# Patient Record
Sex: FEMALE | Race: WHITE | Employment: OTHER | URBAN - METROPOLITAN AREA
[De-identification: names, ages, dates, MRNs, and addresses within clinical notes are randomized per-mention and may not be internally consistent; named-entity substitution may affect disease eponyms.]

---

## 2019-10-09 ENCOUNTER — HOSPITAL ENCOUNTER (OUTPATIENT)
Dept: GENERAL RADIOLOGY | Age: 79
Discharge: HOME | End: 2019-10-09
Payer: MEDICARE

## 2019-10-09 ENCOUNTER — HOSPITAL ENCOUNTER (OUTPATIENT)
Age: 79
Discharge: HOME | End: 2019-10-09
Payer: MEDICARE

## 2019-10-09 DIAGNOSIS — M54.50 CHRONIC MIDLINE LOW BACK PAIN WITHOUT SCIATICA: ICD-10-CM

## 2019-10-09 DIAGNOSIS — G89.29 CHRONIC MIDLINE LOW BACK PAIN WITHOUT SCIATICA: ICD-10-CM

## 2019-10-09 PROCEDURE — 72100 X-RAY EXAM L-S SPINE 2/3 VWS: CPT

## 2020-06-08 ENCOUNTER — APPOINTMENT (OUTPATIENT)
Dept: GENERAL RADIOLOGY | Age: 80
DRG: 884 | End: 2020-06-08
Payer: MEDICARE

## 2020-06-08 ENCOUNTER — APPOINTMENT (OUTPATIENT)
Dept: CT IMAGING | Age: 80
DRG: 884 | End: 2020-06-08
Payer: MEDICARE

## 2020-06-08 ENCOUNTER — HOSPITAL ENCOUNTER (INPATIENT)
Age: 80
LOS: 5 days | Discharge: HOME | DRG: 884 | End: 2020-06-13
Attending: EMERGENCY MEDICINE | Admitting: INTERNAL MEDICINE
Payer: MEDICARE

## 2020-06-08 PROBLEM — R41.82 AMS (ALTERED MENTAL STATUS): Status: ACTIVE | Noted: 2020-06-08

## 2020-06-08 LAB
ALBUMIN SERPL-MCNC: 4.6 GM/DL (ref 3.4–5)
ALP BLD-CCNC: 92 IU/L (ref 40–129)
ALT SERPL-CCNC: 19 U/L (ref 10–40)
ANION GAP SERPL CALCULATED.3IONS-SCNC: 12 MMOL/L (ref 4–16)
AST SERPL-CCNC: 26 IU/L (ref 15–37)
BACTERIA: NEGATIVE /HPF
BASOPHILS ABSOLUTE: 0 K/CU MM
BASOPHILS RELATIVE PERCENT: 0.6 % (ref 0–1)
BILIRUB SERPL-MCNC: 0.4 MG/DL (ref 0–1)
BILIRUBIN URINE: NEGATIVE MG/DL
BLOOD, URINE: NEGATIVE
BUN BLDV-MCNC: 18 MG/DL (ref 6–23)
CALCIUM SERPL-MCNC: 9.4 MG/DL (ref 8.3–10.6)
CHLORIDE BLD-SCNC: 100 MMOL/L (ref 99–110)
CHP ED QC CHECK: YES
CLARITY: ABNORMAL
CO2: 25 MMOL/L (ref 21–32)
COLOR: YELLOW
CREAT SERPL-MCNC: 0.8 MG/DL (ref 0.6–1.1)
DIFFERENTIAL TYPE: ABNORMAL
EKG ATRIAL RATE: 72 BPM
EKG DIAGNOSIS: NORMAL
EKG P AXIS: 28 DEGREES
EKG P-R INTERVAL: 140 MS
EKG Q-T INTERVAL: 384 MS
EKG QRS DURATION: 78 MS
EKG QTC CALCULATION (BAZETT): 420 MS
EKG R AXIS: -12 DEGREES
EKG T AXIS: 40 DEGREES
EKG VENTRICULAR RATE: 72 BPM
EOSINOPHILS ABSOLUTE: 0 K/CU MM
EOSINOPHILS RELATIVE PERCENT: 0.4 % (ref 0–3)
FOLATE: 15.6 NG/ML (ref 3.1–17.5)
GFR AFRICAN AMERICAN: >60 ML/MIN/1.73M2
GFR NON-AFRICAN AMERICAN: >60 ML/MIN/1.73M2
GLUCOSE BLD-MCNC: 82 MG/DL (ref 70–99)
GLUCOSE BLD-MCNC: 86 MG/DL
GLUCOSE BLD-MCNC: 87 MG/DL (ref 70–99)
GLUCOSE, URINE: NEGATIVE MG/DL
HCT VFR BLD CALC: 42.2 % (ref 37–47)
HEMOGLOBIN: 13.8 GM/DL (ref 12.5–16)
IMMATURE NEUTROPHIL %: 0.3 % (ref 0–0.43)
KETONES, URINE: NEGATIVE MG/DL
LACTATE: 0.9 MMOL/L (ref 0.4–2)
LEUKOCYTE ESTERASE, URINE: ABNORMAL
LIPASE: 49 IU/L (ref 13–60)
LYMPHOCYTES ABSOLUTE: 1 K/CU MM
LYMPHOCYTES RELATIVE PERCENT: 14.9 % (ref 24–44)
MAGNESIUM: 2 MG/DL (ref 1.8–2.4)
MCH RBC QN AUTO: 28.2 PG (ref 27–31)
MCHC RBC AUTO-ENTMCNC: 32.7 % (ref 32–36)
MCV RBC AUTO: 86.1 FL (ref 78–100)
MONOCYTES ABSOLUTE: 0.6 K/CU MM
MONOCYTES RELATIVE PERCENT: 9.4 % (ref 0–4)
MUCUS: ABNORMAL HPF
NITRITE URINE, QUANTITATIVE: NEGATIVE
NUCLEATED RBC %: 0 %
PDW BLD-RTO: 14.4 % (ref 11.7–14.9)
PH, URINE: 6 (ref 5–8)
PLATELET # BLD: 208 K/CU MM (ref 140–440)
PMV BLD AUTO: 9.3 FL (ref 7.5–11.1)
POTASSIUM SERPL-SCNC: 3.8 MMOL/L (ref 3.5–5.1)
PRO-BNP: 208.8 PG/ML
PROTEIN UA: NEGATIVE MG/DL
RBC # BLD: 4.9 M/CU MM (ref 4.2–5.4)
RBC URINE: <1 /HPF (ref 0–6)
SEGMENTED NEUTROPHILS ABSOLUTE COUNT: 5.1 K/CU MM
SEGMENTED NEUTROPHILS RELATIVE PERCENT: 74.4 % (ref 36–66)
SODIUM BLD-SCNC: 137 MMOL/L (ref 135–145)
SPECIFIC GRAVITY UA: 1.01 (ref 1–1.03)
T4 FREE: 1.12 NG/DL (ref 0.9–1.8)
TOTAL CK: 289 IU/L (ref 26–140)
TOTAL IMMATURE NEUTOROPHIL: 0.02 K/CU MM
TOTAL NUCLEATED RBC: 0 K/CU MM
TOTAL PROTEIN: 8.3 GM/DL (ref 6.4–8.2)
TRICHOMONAS: ABNORMAL /HPF
TROPONIN T: <0.01 NG/ML
TSH HIGH SENSITIVITY: 5.6 UIU/ML (ref 0.27–4.2)
UROBILINOGEN, URINE: NORMAL MG/DL (ref 0.2–1)
VITAMIN B-12: 527 PG/ML (ref 211–911)
WBC # BLD: 6.8 K/CU MM (ref 4–10.5)
WBC UA: 5 /HPF (ref 0–5)

## 2020-06-08 PROCEDURE — 83735 ASSAY OF MAGNESIUM: CPT

## 2020-06-08 PROCEDURE — 70498 CT ANGIOGRAPHY NECK: CPT

## 2020-06-08 PROCEDURE — 80053 COMPREHEN METABOLIC PANEL: CPT

## 2020-06-08 PROCEDURE — 82607 VITAMIN B-12: CPT

## 2020-06-08 PROCEDURE — 82962 GLUCOSE BLOOD TEST: CPT

## 2020-06-08 PROCEDURE — 70450 CT HEAD/BRAIN W/O DYE: CPT

## 2020-06-08 PROCEDURE — 1200000000 HC SEMI PRIVATE

## 2020-06-08 PROCEDURE — 2580000003 HC RX 258: Performed by: NURSE PRACTITIONER

## 2020-06-08 PROCEDURE — 2580000003 HC RX 258: Performed by: INTERNAL MEDICINE

## 2020-06-08 PROCEDURE — 84484 ASSAY OF TROPONIN QUANT: CPT

## 2020-06-08 PROCEDURE — 84443 ASSAY THYROID STIM HORMONE: CPT

## 2020-06-08 PROCEDURE — 6370000000 HC RX 637 (ALT 250 FOR IP): Performed by: NURSE PRACTITIONER

## 2020-06-08 PROCEDURE — 94761 N-INVAS EAR/PLS OXIMETRY MLT: CPT

## 2020-06-08 PROCEDURE — 84439 ASSAY OF FREE THYROXINE: CPT

## 2020-06-08 PROCEDURE — 36415 COLL VENOUS BLD VENIPUNCTURE: CPT

## 2020-06-08 PROCEDURE — 82550 ASSAY OF CK (CPK): CPT

## 2020-06-08 PROCEDURE — 85025 COMPLETE CBC W/AUTO DIFF WBC: CPT

## 2020-06-08 PROCEDURE — 81001 URINALYSIS AUTO W/SCOPE: CPT

## 2020-06-08 PROCEDURE — 93005 ELECTROCARDIOGRAM TRACING: CPT | Performed by: EMERGENCY MEDICINE

## 2020-06-08 PROCEDURE — 82746 ASSAY OF FOLIC ACID SERUM: CPT

## 2020-06-08 PROCEDURE — 83880 ASSAY OF NATRIURETIC PEPTIDE: CPT

## 2020-06-08 PROCEDURE — 93010 ELECTROCARDIOGRAM REPORT: CPT | Performed by: INTERNAL MEDICINE

## 2020-06-08 PROCEDURE — 6360000004 HC RX CONTRAST MEDICATION: Performed by: INTERNAL MEDICINE

## 2020-06-08 PROCEDURE — 99285 EMERGENCY DEPT VISIT HI MDM: CPT

## 2020-06-08 PROCEDURE — 71045 X-RAY EXAM CHEST 1 VIEW: CPT

## 2020-06-08 PROCEDURE — 83605 ASSAY OF LACTIC ACID: CPT

## 2020-06-08 PROCEDURE — 83690 ASSAY OF LIPASE: CPT

## 2020-06-08 PROCEDURE — 6360000002 HC RX W HCPCS: Performed by: NURSE PRACTITIONER

## 2020-06-08 RX ORDER — ACETAMINOPHEN 325 MG/1
650 TABLET ORAL EVERY 6 HOURS PRN
Status: DISCONTINUED | OUTPATIENT
Start: 2020-06-08 | End: 2020-06-13 | Stop reason: HOSPADM

## 2020-06-08 RX ORDER — EZETIMIBE 10 MG/1
10 TABLET ORAL DAILY
COMMUNITY

## 2020-06-08 RX ORDER — SODIUM CHLORIDE 0.9 % (FLUSH) 0.9 %
10 SYRINGE (ML) INJECTION PRN
Status: DISCONTINUED | OUTPATIENT
Start: 2020-06-08 | End: 2020-06-13 | Stop reason: HOSPADM

## 2020-06-08 RX ORDER — OMEPRAZOLE 40 MG/1
40 CAPSULE, DELAYED RELEASE ORAL DAILY
COMMUNITY
Start: 2020-06-03

## 2020-06-08 RX ORDER — PROMETHAZINE HYDROCHLORIDE 25 MG/1
12.5 TABLET ORAL EVERY 6 HOURS PRN
Status: DISCONTINUED | OUTPATIENT
Start: 2020-06-08 | End: 2020-06-13 | Stop reason: HOSPADM

## 2020-06-08 RX ORDER — EZETIMIBE 10 MG/1
10 TABLET ORAL DAILY
Status: DISCONTINUED | OUTPATIENT
Start: 2020-06-08 | End: 2020-06-13 | Stop reason: HOSPADM

## 2020-06-08 RX ORDER — POLYETHYLENE GLYCOL 3350 17 G/17G
17 POWDER, FOR SOLUTION ORAL DAILY PRN
Status: DISCONTINUED | OUTPATIENT
Start: 2020-06-08 | End: 2020-06-13 | Stop reason: HOSPADM

## 2020-06-08 RX ORDER — ONDANSETRON 2 MG/ML
4 INJECTION INTRAMUSCULAR; INTRAVENOUS EVERY 6 HOURS PRN
Status: DISCONTINUED | OUTPATIENT
Start: 2020-06-08 | End: 2020-06-13 | Stop reason: HOSPADM

## 2020-06-08 RX ORDER — BUSPIRONE HYDROCHLORIDE 5 MG/1
5 TABLET ORAL 3 TIMES DAILY
Status: DISCONTINUED | OUTPATIENT
Start: 2020-06-08 | End: 2020-06-13 | Stop reason: HOSPADM

## 2020-06-08 RX ORDER — SODIUM CHLORIDE 0.9 % (FLUSH) 0.9 %
10 SYRINGE (ML) INJECTION 2 TIMES DAILY
Status: DISCONTINUED | OUTPATIENT
Start: 2020-06-08 | End: 2020-06-13 | Stop reason: HOSPADM

## 2020-06-08 RX ORDER — SODIUM CHLORIDE 0.9 % (FLUSH) 0.9 %
10 SYRINGE (ML) INJECTION EVERY 12 HOURS SCHEDULED
Status: DISCONTINUED | OUTPATIENT
Start: 2020-06-08 | End: 2020-06-13 | Stop reason: HOSPADM

## 2020-06-08 RX ORDER — ACETAMINOPHEN 650 MG/1
650 SUPPOSITORY RECTAL EVERY 6 HOURS PRN
Status: DISCONTINUED | OUTPATIENT
Start: 2020-06-08 | End: 2020-06-13 | Stop reason: HOSPADM

## 2020-06-08 RX ORDER — BUSPIRONE HYDROCHLORIDE 5 MG/1
5 TABLET ORAL 2 TIMES DAILY
Status: ON HOLD | COMMUNITY
End: 2021-03-17 | Stop reason: HOSPADM

## 2020-06-08 RX ADMIN — SODIUM CHLORIDE, PRESERVATIVE FREE 10 ML: 5 INJECTION INTRAVENOUS at 19:35

## 2020-06-08 RX ADMIN — EZETIMIBE 10 MG: 10 TABLET ORAL at 21:39

## 2020-06-08 RX ADMIN — SODIUM CHLORIDE, PRESERVATIVE FREE 10 ML: 5 INJECTION INTRAVENOUS at 21:40

## 2020-06-08 RX ADMIN — BUSPIRONE HYDROCHLORIDE 5 MG: 5 TABLET ORAL at 21:40

## 2020-06-08 RX ADMIN — IOPAMIDOL 70 ML: 755 INJECTION, SOLUTION INTRAVENOUS at 19:35

## 2020-06-08 RX ADMIN — ENOXAPARIN SODIUM 40 MG: 40 INJECTION SUBCUTANEOUS at 21:40

## 2020-06-08 ASSESSMENT — PAIN SCALES - GENERAL: PAINLEVEL_OUTOF10: 0
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
## 2020-06-08 NOTE — ED NOTES
Lucia Minor from case management is at the bedside at this time. This nurse spoke with the  nurse concerning the grand-daughter, Jack Madsen, who is waiting in the [address] [facility], to advise her that a  will be out to speak to her after he has spoken with the patient.       Yoseph Gusman, FORTUNATO  06/08/20 2266

## 2020-06-08 NOTE — PROGRESS NOTES
Medication History  [facility]    Patient Name: Jan Arreguin 1940     Medication history has been completed by: Micki Gamboa CPhT    Source(s) of information: insurance claims     Primary Care Physician: KALI Back CNP     Pharmacy: [address]    Allergies as of 06/08/2020    (No Known Allergies)        Prior to Admission medications    Medication Sig Start Date End Date Taking? Authorizing Provider   ezetimibe (ZETIA) 10 MG tablet Take 10 mg by mouth daily    Historical Provider, MD   busPIRone (BUSPAR) 5 MG tablet Take 5 mg by mouth 3 times daily    Historical Provider, MD   omeprazole (PRILOSEC) 40 MG delayed release capsule Take 40 mg by mouth daily 6/3/20   Historical Provider, MD       Medications added or changed (ex. new medication, dosage change, interval change, formulation change):  See medication list as stated above    Comments:  Unable to assess patient d/t altered status. Medication list per insurance claims.      To my knowledge the above medication history is accurate as of 6/8/2020 5:11 PM.   Micki Gamboa CPhT   6/8/2020 5:11 PM
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
## 2020-06-08 NOTE — PLAN OF CARE
Problem: Falls - Risk of:  Goal: Will remain free from falls  Outcome: Ongoing  Goal: Absence of physical injury  Outcome: Ongoing     Problem: Confusion - Acute:  Goal: Absence of continued neurological deterioration signs and symptoms  Outcome: Ongoing  Goal: Mental status will be restored to baseline  Outcome: Ongoing     Problem: Discharge Planning:  Goal: Ability to perform activities of daily living will improve  Outcome: Ongoing  Goal: Participates in care planning  Outcome: Ongoing     Problem: Injury - Risk of, Physical Injury:  Goal: Will remain free from falls  Outcome: Ongoing  Goal: Absence of physical injury  Outcome: Ongoing     Problem: Mood - Altered:  Goal: Mood stable  Outcome: Ongoing  Goal: Absence of abusive behavior  Outcome: Ongoing  Goal: Verbalizations of feeling emotionally comfortable while being cared for will increase  Outcome: Ongoing     Problem: Psychomotor Activity - Altered:  Goal: Absence of psychomotor disturbance signs and symptoms  Outcome: Ongoing     Problem: Sensory Perception - Impaired:  Goal: Demonstrations of improved sensory functioning will increase  Outcome: Ongoing  Goal: Decrease in sensory misperception frequency  Outcome: Ongoing  Goal: Able to refrain from responding to false sensory perceptions  Outcome: Ongoing  Goal: Demonstrates accurate environmental perceptions  Outcome: Ongoing  Goal: Able to distinguish between reality-based and nonreality-based thinking  Outcome: Ongoing  Goal: Able to interrupt nonreality-based thinking  Outcome: Ongoing     Problem: Sleep Pattern Disturbance:  Goal: Appears well-rested  Outcome: Ongoing

## 2020-06-08 NOTE — ED NOTES
Meal tray ordered for pt by safety sitmiguel Meredith.         Debora Peterson, FORTUNATO  06/08/20 4761

## 2020-06-08 NOTE — ED NOTES
Bed: ED-16  Expected date:   Expected time:   Means of arrival:   Comments:  Srinivas Michaud RN  06/08/20 5402
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
## 2020-06-08 NOTE — H&P
History and Physical      Name:  Daina Martin /Age/Sex: 1940  (70 y.o. female)   MRN & CSN:  [id number] & [id number] Admission Date/Time: 2020  1:25 PM   Location:  ED16/ED-16 PCP: KALI Paige - CNP       Discussed patient with Dr. Orlando Hager and Plan:     Daina Martin is a 70 y.o.  female  who presents with AMS    - AMS  Likely has undiagnosed dementia; per family, acutely worse- wandered away from home  Patient expressed concern about returning to live with granddaughter to ED staff- CM consulted by ED  ? Progressing undiagnosed dementia  UA negative; no s/sx of infection  CT head with ? Mass/Aneurysm (see below)  Check b12, ammonia, Free t 4 (TSH mildly elevated 5.6)  Sitter at bedside    - ? Mass, brain   Headaches past week  CT head: 1 cm non-specific frontal mid-line mass-- ? Mass v aneurysm  Check CTA head, per RAD recommendation  Holding neuro consult pending CTA results      Chronic  - HLD- Cont zetia  - Anxiety- Cont buspar  - ? Dementia- Family denies formal diagnosis; reports to CM that patient has memory issues/requires careful watch     Discussed patient with ER physician     Diet GEN   DVT Prophylaxis [x] Lovenox, []  Heparin, [] SCDs, [] Ambulation   GI Prophylaxis [x] PPI,  [] H2 Blocker,  [] Carafate,  [] Diet/Tube Feeds   Code Status Full   Disposition Patient requires continued admission due to AMS   MDM [] Low, [x] Moderate,[]  High  Patient's risk as above due to      [] One or more chronic illnesses with exacerbation progression      [x] Two or more stable chronic illnesses      [x] Undiagnosed new problem with uncertain prognosis      [] Elective major surgery      []Prescription drug management       History of Present Illness:     Chief Complaint: Ivy Vasquez is a 70 y.o.  female  who presents with the above complaints, onset today. Context is, patient has ?  Undiagnosed hx of dementia, and was found walking

## 2020-06-08 NOTE — ED NOTES
Back to the restroom with this nurse in attendance. The patient voided very well. Full specimen sent to lab. Lab called.       Sherrell Lemos RN  06/08/20 6119

## 2020-06-09 PROBLEM — R47.01 EXPRESSIVE APHASIA: Status: ACTIVE | Noted: 2020-06-09

## 2020-06-09 PROBLEM — E43 SEVERE MALNUTRITION (HCC): Chronic | Status: ACTIVE | Noted: 2020-06-09

## 2020-06-09 PROBLEM — I67.1 ANTERIOR COMMUNICATING ARTERY ANEURYSM: Status: ACTIVE | Noted: 2020-06-09

## 2020-06-09 LAB
AMMONIA: 38 UMOL/L (ref 11–51)
BASOPHILS ABSOLUTE: 0.1 K/CU MM
BASOPHILS RELATIVE PERCENT: 0.8 % (ref 0–1)
DIFFERENTIAL TYPE: ABNORMAL
EOSINOPHILS ABSOLUTE: 0.2 K/CU MM
EOSINOPHILS RELATIVE PERCENT: 2.4 % (ref 0–3)
ERYTHROCYTE SEDIMENTATION RATE: 11 MM/HR (ref 0–30)
HCT VFR BLD CALC: 40.7 % (ref 37–47)
HEMOGLOBIN: 13.2 GM/DL (ref 12.5–16)
IMMATURE NEUTROPHIL %: 0.3 % (ref 0–0.43)
LYMPHOCYTES ABSOLUTE: 2.2 K/CU MM
LYMPHOCYTES RELATIVE PERCENT: 34.2 % (ref 24–44)
MCH RBC QN AUTO: 28 PG (ref 27–31)
MCHC RBC AUTO-ENTMCNC: 32.4 % (ref 32–36)
MCV RBC AUTO: 86.2 FL (ref 78–100)
MONOCYTES ABSOLUTE: 0.8 K/CU MM
MONOCYTES RELATIVE PERCENT: 11.9 % (ref 0–4)
NUCLEATED RBC %: 0 %
PDW BLD-RTO: 14.5 % (ref 11.7–14.9)
PLATELET # BLD: 199 K/CU MM (ref 140–440)
PMV BLD AUTO: 9.5 FL (ref 7.5–11.1)
RBC # BLD: 4.72 M/CU MM (ref 4.2–5.4)
SEGMENTED NEUTROPHILS ABSOLUTE COUNT: 3.2 K/CU MM
SEGMENTED NEUTROPHILS RELATIVE PERCENT: 50.4 % (ref 36–66)
TOTAL CK: 213 IU/L (ref 26–140)
TOTAL IMMATURE NEUTOROPHIL: 0.02 K/CU MM
TOTAL NUCLEATED RBC: 0 K/CU MM
WBC # BLD: 6.3 K/CU MM (ref 4–10.5)

## 2020-06-09 PROCEDURE — 6360000002 HC RX W HCPCS: Performed by: NURSE PRACTITIONER

## 2020-06-09 PROCEDURE — 6370000000 HC RX 637 (ALT 250 FOR IP): Performed by: NURSE PRACTITIONER

## 2020-06-09 PROCEDURE — 82550 ASSAY OF CK (CPK): CPT

## 2020-06-09 PROCEDURE — 1200000000 HC SEMI PRIVATE

## 2020-06-09 PROCEDURE — 2580000003 HC RX 258: Performed by: INTERNAL MEDICINE

## 2020-06-09 PROCEDURE — 99223 1ST HOSP IP/OBS HIGH 75: CPT | Performed by: NURSE PRACTITIONER

## 2020-06-09 PROCEDURE — 36415 COLL VENOUS BLD VENIPUNCTURE: CPT

## 2020-06-09 PROCEDURE — 2580000003 HC RX 258: Performed by: NURSE PRACTITIONER

## 2020-06-09 PROCEDURE — 2500000003 HC RX 250 WO HCPCS: Performed by: NURSE PRACTITIONER

## 2020-06-09 PROCEDURE — 85652 RBC SED RATE AUTOMATED: CPT

## 2020-06-09 PROCEDURE — 85025 COMPLETE CBC W/AUTO DIFF WBC: CPT

## 2020-06-09 PROCEDURE — 94761 N-INVAS EAR/PLS OXIMETRY MLT: CPT

## 2020-06-09 PROCEDURE — 82140 ASSAY OF AMMONIA: CPT

## 2020-06-09 RX ORDER — KETOROLAC TROMETHAMINE 30 MG/ML
15 INJECTION, SOLUTION INTRAMUSCULAR; INTRAVENOUS ONCE
Status: COMPLETED | OUTPATIENT
Start: 2020-06-09 | End: 2020-06-09

## 2020-06-09 RX ORDER — ASPIRIN 81 MG/1
81 TABLET, CHEWABLE ORAL DAILY
Status: DISCONTINUED | OUTPATIENT
Start: 2020-06-09 | End: 2020-06-13 | Stop reason: HOSPADM

## 2020-06-09 RX ORDER — DEXAMETHASONE SODIUM PHOSPHATE 4 MG/ML
10 INJECTION, SOLUTION INTRA-ARTICULAR; INTRALESIONAL; INTRAMUSCULAR; INTRAVENOUS; SOFT TISSUE ONCE
Status: COMPLETED | OUTPATIENT
Start: 2020-06-09 | End: 2020-06-09

## 2020-06-09 RX ADMIN — ACETAMINOPHEN 650 MG: 325 TABLET ORAL at 17:28

## 2020-06-09 RX ADMIN — VALPROATE SODIUM 500 MG: 100 INJECTION, SOLUTION INTRAVENOUS at 21:25

## 2020-06-09 RX ADMIN — DEXAMETHASONE SODIUM PHOSPHATE 10 MG: 4 INJECTION, SOLUTION INTRAMUSCULAR; INTRAVENOUS at 12:41

## 2020-06-09 RX ADMIN — ASPIRIN 81 MG 81 MG: 81 TABLET ORAL at 13:01

## 2020-06-09 RX ADMIN — SODIUM CHLORIDE, PRESERVATIVE FREE 10 ML: 5 INJECTION INTRAVENOUS at 08:41

## 2020-06-09 RX ADMIN — ACETAMINOPHEN 650 MG: 325 TABLET ORAL at 11:28

## 2020-06-09 RX ADMIN — VALPROATE SODIUM 500 MG: 100 INJECTION, SOLUTION INTRAVENOUS at 12:41

## 2020-06-09 RX ADMIN — SODIUM CHLORIDE, PRESERVATIVE FREE 10 ML: 5 INJECTION INTRAVENOUS at 08:42

## 2020-06-09 RX ADMIN — EZETIMIBE 10 MG: 10 TABLET ORAL at 08:41

## 2020-06-09 RX ADMIN — SODIUM CHLORIDE, PRESERVATIVE FREE 10 ML: 5 INJECTION INTRAVENOUS at 21:25

## 2020-06-09 RX ADMIN — KETOROLAC TROMETHAMINE 15 MG: 30 INJECTION, SOLUTION INTRAMUSCULAR; INTRAVENOUS at 16:03

## 2020-06-09 RX ADMIN — ENOXAPARIN SODIUM 40 MG: 40 INJECTION SUBCUTANEOUS at 08:41

## 2020-06-09 RX ADMIN — BUSPIRONE HYDROCHLORIDE 5 MG: 5 TABLET ORAL at 08:41

## 2020-06-09 RX ADMIN — ACETAMINOPHEN 650 MG: 325 TABLET ORAL at 04:44

## 2020-06-09 RX ADMIN — BUSPIRONE HYDROCHLORIDE 5 MG: 5 TABLET ORAL at 13:01

## 2020-06-09 RX ADMIN — BUSPIRONE HYDROCHLORIDE 5 MG: 5 TABLET ORAL at 21:25

## 2020-06-09 ASSESSMENT — PAIN DESCRIPTION - DESCRIPTORS
DESCRIPTORS: HEADACHE
DESCRIPTORS: HEADACHE

## 2020-06-09 ASSESSMENT — PAIN SCALES - GENERAL
PAINLEVEL_OUTOF10: 4
PAINLEVEL_OUTOF10: 3
PAINLEVEL_OUTOF10: 8
PAINLEVEL_OUTOF10: 8
PAINLEVEL_OUTOF10: 3
PAINLEVEL_OUTOF10: 6
PAINLEVEL_OUTOF10: 3

## 2020-06-09 ASSESSMENT — PAIN DESCRIPTION - LOCATION
LOCATION: HEAD
LOCATION: HEAD

## 2020-06-09 ASSESSMENT — PAIN DESCRIPTION - PAIN TYPE
TYPE: ACUTE PAIN
TYPE: ACUTE PAIN

## 2020-06-09 NOTE — CARE COORDINATION
This CM delivered pt eye glasses to her room, pt was thankful for the delivery, tech in room at the time I stopped in pt room to give pt her glasses.  MONICARN/CM

## 2020-06-09 NOTE — CONSULTS
mg Oral TID    ezetimibe  10 mg Oral Daily    sodium chloride flush  10 mL Intravenous 2 times per day    enoxaparin  40 mg Subcutaneous Daily    sodium chloride flush  10 mL Intravenous BID     Continuous Infusions:  PRN Meds:.sodium chloride flush, acetaminophen **OR** acetaminophen, polyethylene glycol, promethazine **OR** ondansetron    No Known Allergies  Social History     Socioeconomic History    Marital status: Single     Spouse name: Not on file    Number of children: Not on file    Years of education: Not on file    Highest education level: Not on file   Occupational History    Not on file   Social Needs    Financial resource strain: Not on file    Food insecurity     Worry: Not on file     Inability: Not on file    Transportation needs     Medical: Not on file     Non-medical: Not on file   Tobacco Use    Smoking status: Former Smoker     Types: Cigarettes    Smokeless tobacco: Never Used   Substance and Sexual Activity    Alcohol use: Not Currently    Drug use: Never    Sexual activity: Not Currently   Lifestyle    Physical activity     Days per week: Not on file     Minutes per session: Not on file    Stress: Not on file   Relationships    Social connections     Talks on phone: Not on file     Gets together: Not on file     Attends Sikhism service: Not on file     Active member of club or organization: Not on file     Attends meetings of clubs or organizations: Not on file     Relationship status: Not on file    Intimate partner violence     Fear of current or ex partner: Not on file     Emotionally abused: Not on file     Physically abused: Not on file     Forced sexual activity: Not on file   Other Topics Concern    Not on file   Social History Narrative    Not on file      History reviewed. No pertinent family history. Review of Symptoms:    14-point system review completed. All of which are negative except as mentioned above.     Physical Exam:       Gen: Alert and cooperative but disoriented  HEENT: NC/AT, EOMI, PERRL, mmm,  neck supple, no meningeal signs; bilateral temporal region tenderness to palpation  Heart: regular  Lungs: no distress  Ext: no edema, no calf tenderness b/l  Psych: normal mood and affect  Skin: no rashes or lesions    NEUROLOGIC EXAM:    Mental Status: A&O to self, he has an expressive aphasia with there is word finding difficulties she has difficulty naming but she also has a rambling of thoughts that will make sense of the conversation, her attention and concentration is off, but her speech is clear    Cranial Nerve Exam:   CN II-XII:  PERRL, VFF, no nystagmus, no gaze paresis, sensation V1-V3 intact b/l, muscles of facial expression symmetric; hearing intact to conversational tone, palate elevates symmetrically, shoulder elevation symmetric and tongue protrudes midline with movement side to side. Motor Exam:       Strength 5/5 UE's/LE's b/l  Tone and bulk normal   No pronator drift    Deep Tendon Reflexes: 2/4 biceps, triceps, brachioradialis, patellar, and achilles b/l; flexor plantar responses b/l    Sensation: Intact light touch/temp UE's/LE's b/l    Coordination/Cerebellum:       Tremors--none      Rapidly alternating movements: no dysdiadochokinesia b/l                Finger-to-Nose: no dysmetria b/l    Gait and stance:      Gait: deferred for safety       LABS:     Recent Labs     06/08/20  1420 06/08/20  1616 06/09/20  0415   WBC 6.8  --  6.3     --   --    K 3.8  --   --      --   --    CO2 25  --   --    BUN 18  --   --    CREATININE 0.8  --   --    GLUCOSE 82 86  --      Sed rate pending       IMAGING:    Mri brain wo pending  CTA Acomm aneursym 1.2cm     Images reviewed personally and agree with above impression.        ASSESSMENT/PLAN:     3 70year old female with bilateral temple region cephalgia with associated expressive aphasia and some disorientation, consideration for Infarction v. Migraine v. GCA v. 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 Plan of care as follows:  1. Neuro Exam:  1. Hinduism region tenderness b/l   2. Word findings   2. Neurodiagnostics:  1. MRI brain wo pending  2. CTA as above  3. Sed rate pending   3. Medications:  1. Depacon, decadron, and toradol for acute headache   2. ASA 81mg okay  4. PT/OT/ST:  1. Per their recommendations  5. Follow up:  1. Pending completion of neurodiagnostics         Thank you for allowing us to participate in the care of your patient. If there are any questions regarding evaluation please feel free to contact us.      KALI Montague - WONG, 2020

## 2020-06-09 NOTE — PLAN OF CARE
Problem: Falls - Risk of:  Goal: Will remain free from falls  Description: Will remain free from falls  6/9/2020 0350 by Nanette Watts RN  Outcome: Ongoing  6/8/2020 1909 by Barby Moody RN  Outcome: Ongoing  Goal: Absence of physical injury  Description: Absence of physical injury  6/9/2020 0350 by Nanette Watts RN  Outcome: Ongoing  6/8/2020 1909 by Barby Moody RN  Outcome: Ongoing     Problem: Confusion - Acute:  Goal: Absence of continued neurological deterioration signs and symptoms  Description: Absence of continued neurological deterioration signs and symptoms  6/9/2020 0350 by Nanette Watts RN  Outcome: Ongoing  6/8/2020 1909 by Barby Moody RN  Outcome: Ongoing  Goal: Mental status will be restored to baseline  Description: Mental status will be restored to baseline  6/9/2020 0350 by Nanette Watts RN  Outcome: Ongoing  6/8/2020 1909 by Barby Moody RN  Outcome: Ongoing     Problem: Discharge Planning:  Goal: Ability to perform activities of daily living will improve  Description: Ability to perform activities of daily living will improve  6/9/2020 0350 by Nanette Watts RN  Outcome: Ongoing  6/8/2020 1909 by Barby Moody RN  Outcome: Ongoing  Goal: Participates in care planning  Description: Participates in care planning  6/9/2020 0350 by Nanette Watts RN  Outcome: Ongoing  6/8/2020 1909 by Barby Moody RN  Outcome: Ongoing     Problem: Injury - Risk of, Physical Injury:  Goal: Will remain free from falls  Description: Will remain free from falls  6/9/2020 0350 by Nanette Watts RN  Outcome: Ongoing  6/8/2020 1909 by Barby Moody RN  Outcome: Ongoing  Goal: Absence of physical injury  Description: Absence of physical injury  6/9/2020 0350 by Nanette Watts RN  Outcome: Ongoing  6/8/2020 1909 by Barby Moody RN  Outcome: Ongoing     Problem: Mood - Altered:  Goal: Mood stable  Description: Mood stable  6/9/2020 0350 by Nanette Watts RN  Outcome:

## 2020-06-09 NOTE — PROGRESS NOTES
Department of Internal Medicine  General Internal Medicine  Attending Progress Note      SUBJECTIVE:  She has very poor memory. She does not know the year. She cannot answer where she is. She seems to have dementia. Not sure if she is confabulating or not. I talked to her granddaughter who lives with her; she says that she has FH of Alzheimer's. Her mental status has been declining but most rapidly in the last two months.       OBJECTIVE      Medications    Current Facility-Administered Medications: busPIRone (BUSPAR) tablet 5 mg, 5 mg, Oral, TID  ezetimibe (ZETIA) tablet 10 mg, 10 mg, Oral, Daily  sodium chloride flush 0.9 % injection 10 mL, 10 mL, Intravenous, 2 times per day  sodium chloride flush 0.9 % injection 10 mL, 10 mL, Intravenous, PRN  acetaminophen (TYLENOL) tablet 650 mg, 650 mg, Oral, Q6H PRN **OR** acetaminophen (TYLENOL) suppository 650 mg, 650 mg, Rectal, Q6H PRN  polyethylene glycol (GLYCOLAX) packet 17 g, 17 g, Oral, Daily PRN  promethazine (PHENERGAN) tablet 12.5 mg, 12.5 mg, Oral, Q6H PRN **OR** ondansetron (ZOFRAN) injection 4 mg, 4 mg, Intravenous, Q6H PRN  enoxaparin (LOVENOX) injection 40 mg, 40 mg, Subcutaneous, Daily  sodium chloride flush 0.9 % injection 10 mL, 10 mL, Intravenous, BID      Physical    VITALS:  /62   Pulse 64   Temp 97.3 °F (36.3 °C) (Oral)   Resp 17   Ht 5' 3\" (1.6 m)   Wt 95 lb 10.9 oz (43.4 kg)   SpO2 98%   BMI 16.95 kg/m²   Gen - a & O x 1  HEENT -  PERRLA, EOMI  CV - RRR no M/G/R, normal s1, s2  Lungs - CTA bilaterally no W/C/R  Abd - soft, NT, ND  Ext - no cyanosis or edema    Data    CBC:   Lab Results   Component Value Date    WBC 6.3 06/09/2020    RBC 4.72 06/09/2020    HGB 13.2 06/09/2020    HCT 40.7 06/09/2020    MCV 86.2 06/09/2020    MCH 28.0 06/09/2020    MCHC 32.4 06/09/2020    RDW 14.5 06/09/2020     06/09/2020    MPV 9.5 06/09/2020       ASSESSMENT AND PLAN        Chip Baker is a 69 yo  female who presents

## 2020-06-10 ENCOUNTER — APPOINTMENT (OUTPATIENT)
Dept: MRI IMAGING | Age: 80
DRG: 884 | End: 2020-06-10
Payer: MEDICARE

## 2020-06-10 LAB
ALBUMIN SERPL-MCNC: 4.3 GM/DL (ref 3.4–5)
ALP BLD-CCNC: 85 IU/L (ref 40–128)
ALT SERPL-CCNC: 15 U/L (ref 10–40)
ANION GAP SERPL CALCULATED.3IONS-SCNC: 9 MMOL/L (ref 4–16)
AST SERPL-CCNC: 23 IU/L (ref 15–37)
BILIRUB SERPL-MCNC: 0.3 MG/DL (ref 0–1)
BUN BLDV-MCNC: 20 MG/DL (ref 6–23)
CALCIUM SERPL-MCNC: 10.1 MG/DL (ref 8.3–10.6)
CHLORIDE BLD-SCNC: 101 MMOL/L (ref 99–110)
CO2: 26 MMOL/L (ref 21–32)
CREAT SERPL-MCNC: 0.8 MG/DL (ref 0.6–1.1)
GFR AFRICAN AMERICAN: >60 ML/MIN/1.73M2
GFR NON-AFRICAN AMERICAN: >60 ML/MIN/1.73M2
GLUCOSE BLD-MCNC: 94 MG/DL (ref 70–99)
HCT VFR BLD CALC: 41.3 % (ref 37–47)
HEMOGLOBIN: 13.6 GM/DL (ref 12.5–16)
MCH RBC QN AUTO: 28.4 PG (ref 27–31)
MCHC RBC AUTO-ENTMCNC: 32.9 % (ref 32–36)
MCV RBC AUTO: 86.2 FL (ref 78–100)
PDW BLD-RTO: 14.1 % (ref 11.7–14.9)
PLATELET # BLD: 227 K/CU MM (ref 140–440)
PMV BLD AUTO: 9.6 FL (ref 7.5–11.1)
POTASSIUM SERPL-SCNC: 4 MMOL/L (ref 3.5–5.1)
RBC # BLD: 4.79 M/CU MM (ref 4.2–5.4)
SODIUM BLD-SCNC: 136 MMOL/L (ref 135–145)
TOTAL PROTEIN: 7.3 GM/DL (ref 6.4–8.2)
WBC # BLD: 8.4 K/CU MM (ref 4–10.5)

## 2020-06-10 PROCEDURE — 6360000002 HC RX W HCPCS: Performed by: INTERNAL MEDICINE

## 2020-06-10 PROCEDURE — 6370000000 HC RX 637 (ALT 250 FOR IP): Performed by: NURSE PRACTITIONER

## 2020-06-10 PROCEDURE — 2580000003 HC RX 258: Performed by: NURSE PRACTITIONER

## 2020-06-10 PROCEDURE — 80053 COMPREHEN METABOLIC PANEL: CPT

## 2020-06-10 PROCEDURE — 1200000000 HC SEMI PRIVATE

## 2020-06-10 PROCEDURE — 6360000002 HC RX W HCPCS: Performed by: NURSE PRACTITIONER

## 2020-06-10 PROCEDURE — 2500000003 HC RX 250 WO HCPCS: Performed by: NURSE PRACTITIONER

## 2020-06-10 PROCEDURE — 36415 COLL VENOUS BLD VENIPUNCTURE: CPT

## 2020-06-10 PROCEDURE — 85027 COMPLETE CBC AUTOMATED: CPT

## 2020-06-10 PROCEDURE — 99233 SBSQ HOSP IP/OBS HIGH 50: CPT | Performed by: NURSE PRACTITIONER

## 2020-06-10 PROCEDURE — 6370000000 HC RX 637 (ALT 250 FOR IP): Performed by: INTERNAL MEDICINE

## 2020-06-10 PROCEDURE — 6360000002 HC RX W HCPCS: Performed by: PHYSICIAN ASSISTANT

## 2020-06-10 PROCEDURE — 94761 N-INVAS EAR/PLS OXIMETRY MLT: CPT

## 2020-06-10 RX ORDER — HALOPERIDOL 5 MG/ML
5 INJECTION INTRAMUSCULAR ONCE
Status: COMPLETED | OUTPATIENT
Start: 2020-06-10 | End: 2020-06-10

## 2020-06-10 RX ORDER — DIPHENHYDRAMINE HYDROCHLORIDE 50 MG/ML
50 INJECTION INTRAMUSCULAR; INTRAVENOUS
Status: COMPLETED | OUTPATIENT
Start: 2020-06-10 | End: 2020-06-10

## 2020-06-10 RX ORDER — HALOPERIDOL 1 MG/1
0.5 TABLET ORAL ONCE
Status: DISCONTINUED | OUTPATIENT
Start: 2020-06-10 | End: 2020-06-13 | Stop reason: HOSPADM

## 2020-06-10 RX ORDER — LORAZEPAM 2 MG/ML
1 INJECTION INTRAMUSCULAR ONCE
Status: COMPLETED | OUTPATIENT
Start: 2020-06-10 | End: 2020-06-10

## 2020-06-10 RX ORDER — HALOPERIDOL 1 MG/1
1 TABLET ORAL ONCE
Status: COMPLETED | OUTPATIENT
Start: 2020-06-10 | End: 2020-06-10

## 2020-06-10 RX ADMIN — ENOXAPARIN SODIUM 40 MG: 40 INJECTION SUBCUTANEOUS at 08:05

## 2020-06-10 RX ADMIN — VALPROATE SODIUM 500 MG: 100 INJECTION, SOLUTION INTRAVENOUS at 04:33

## 2020-06-10 RX ADMIN — HALOPERIDOL 1 MG: 1 TABLET ORAL at 11:37

## 2020-06-10 RX ADMIN — LORAZEPAM 1 MG: 2 INJECTION INTRAMUSCULAR; INTRAVENOUS at 15:09

## 2020-06-10 RX ADMIN — ACETAMINOPHEN 650 MG: 325 TABLET ORAL at 03:43

## 2020-06-10 RX ADMIN — HALOPERIDOL LACTATE 5 MG: 5 INJECTION INTRAMUSCULAR at 23:16

## 2020-06-10 RX ADMIN — SODIUM CHLORIDE, PRESERVATIVE FREE 10 ML: 5 INJECTION INTRAVENOUS at 21:51

## 2020-06-10 RX ADMIN — EZETIMIBE 10 MG: 10 TABLET ORAL at 08:05

## 2020-06-10 RX ADMIN — BUSPIRONE HYDROCHLORIDE 5 MG: 5 TABLET ORAL at 08:05

## 2020-06-10 RX ADMIN — DIPHENHYDRAMINE HYDROCHLORIDE 50 MG: 50 INJECTION, SOLUTION INTRAMUSCULAR; INTRAVENOUS at 21:45

## 2020-06-10 RX ADMIN — LORAZEPAM 1 MG: 2 INJECTION INTRAMUSCULAR; INTRAVENOUS at 16:25

## 2020-06-10 RX ADMIN — ASPIRIN 81 MG 81 MG: 81 TABLET ORAL at 08:05

## 2020-06-10 RX ADMIN — SODIUM CHLORIDE, PRESERVATIVE FREE 10 ML: 5 INJECTION INTRAVENOUS at 08:05

## 2020-06-10 RX ADMIN — HALOPERIDOL LACTATE 5 MG: 5 INJECTION INTRAMUSCULAR at 21:45

## 2020-06-10 RX ADMIN — PROMETHAZINE HYDROCHLORIDE 12.5 MG: 25 TABLET ORAL at 10:41

## 2020-06-10 ASSESSMENT — PAIN DESCRIPTION - ONSET: ONSET: AWAKENED FROM SLEEP

## 2020-06-10 ASSESSMENT — PAIN DESCRIPTION - PROGRESSION
CLINICAL_PROGRESSION: NOT CHANGED

## 2020-06-10 ASSESSMENT — PAIN SCALES - GENERAL
PAINLEVEL_OUTOF10: 3
PAINLEVEL_OUTOF10: 0
PAINLEVEL_OUTOF10: 3

## 2020-06-10 ASSESSMENT — PAIN DESCRIPTION - LOCATION: LOCATION: HEAD

## 2020-06-10 ASSESSMENT — PAIN DESCRIPTION - DESCRIPTORS: DESCRIPTORS: HEADACHE

## 2020-06-10 ASSESSMENT — PAIN DESCRIPTION - PAIN TYPE: TYPE: ACUTE PAIN

## 2020-06-10 ASSESSMENT — PAIN DESCRIPTION - FREQUENCY: FREQUENCY: INTERMITTENT

## 2020-06-10 NOTE — PROGRESS NOTES
Patient given 1mg IV ativan about 30 minutes before MRI was attempted. Guillermina in MRI called and said patient could not lay still for images. Patient given another mg IV Ativan and Guillermina stated that it seemed to make patient worse. Patient was transported back to the floor and [facility] was notified who called patient's granddaughter. Patient's granddaughter stated that patient will need fully sedated for MRI and because MRI is needed Keisha Carlson wants to order MRI with sedation. Patient is increasingly agitated and climbing out of bed, so SHANDRA was removed from room and sitter was requested.

## 2020-06-10 NOTE — PROGRESS NOTES
Activity    Alcohol use: Not Currently    Drug use: Never    Sexual activity: Not Currently   Lifestyle    Physical activity     Days per week: Not on file     Minutes per session: Not on file    Stress: Not on file   Relationships    Social connections     Talks on phone: Not on file     Gets together: Not on file     Attends Jewish service: Not on file     Active member of club or organization: Not on file     Attends meetings of clubs or organizations: Not on file     Relationship status: Not on file    Intimate partner violence     Fear of current or ex partner: Not on file     Emotionally abused: Not on file     Physically abused: Not on file     Forced sexual activity: Not on file   Other Topics Concern    Not on file   Social History Narrative    Not on file      History reviewed. No pertinent family history. Review of Symptoms:    14-point system review completed. All of which are negative except as mentioned above. Physical Exam:       Gen: Alert and cooperative but disoriented  HEENT: NC/AT, EOMI, PERRL, mmm,  neck supple, no meningeal signs; bilateral temporal region tenderness to palpation  Heart: regular  Lungs: no distress  Ext: no edema, no calf tenderness b/l  Psych: normal mood and affect  Skin: no rashes or lesions    NEUROLOGIC EXAM:    Mental Status: A&O to self, he has an expressive aphasia with there is word finding difficulties she has difficulty naming but she also has a rambling of thoughts that will make sense of the conversation, her attention and concentration is off, but her speech is clear    Cranial Nerve Exam:   CN II-XII:  PERRL, VFF, no nystagmus, no gaze paresis, sensation V1-V3 intact b/l, muscles of facial expression symmetric; hearing intact to conversational tone, palate elevates symmetrically, shoulder elevation symmetric and tongue protrudes midline with movement side to side.     Motor Exam:       Strength 5/5 UE's/LE's b/l  Tone and bulk normal   No pronator drift    Deep Tendon Reflexes: 2/4 biceps, triceps, brachioradialis, patellar, and achilles b/l; flexor plantar responses b/l    Sensation: Intact light touch/temp UE's/LE's b/l    Coordination/Cerebellum:       Tremors--none      Rapidly alternating movements: no dysdiadochokinesia b/l                Finger-to-Nose: no dysmetria b/l    Gait and stance:      Gait: deferred for safety       LABS:     Recent Labs     20  1420 20  1616 20  0415 06/10/20  0420   WBC 6.8  --  6.3 8.4     --   --  136   K 3.8  --   --  4.0     --   --  101   CO2 25  --   --  26   BUN 18  --   --  20   CREATININE 0.8  --   --  0.8   GLUCOSE 82 86  --  94   ESR  --   --  11  --      Sed rate 11      IMAGING:    Mri brain wo pending  CTA Acomm aneursym 1.2cm     Images reviewed personally and agree with above impression. ASSESSMENT/PLAN:     3 70year old female with bilateral temple region cephalgia with associated expressive aphasia and some disorientation, consideration for Infarction v. Migraine v. GCA v. 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 Plan of care as follows:  1. Neuro Exam:  1. Non focal  2. Disoriented   2. Neurodiagnostics:  1. MRI brain wo pending  2. CTA as above  3. Sed rate pending   3. Medications:  1. Depacon, decadron, and toradol for acute headache   2. ASA 81mg okay  4. PT/OT/ST:  1. Per their recommendations  5. Follow up:  1. Pending completion of neurodiagnostics         Thank you for allowing us to participate in the care of your patient. If there are any questions regarding evaluation please feel free to contact us.      KALI Colvin CNP, 6/10/2020

## 2020-06-10 NOTE — CARE COORDINATION
Called patient jenniferughter/Kasey [phone] and left message for dc planning. 2:42 PM  Chart reviewed including APS called in ED. Updated by nursing patient is only alert and oriented to self. CM spoke with patient jenniferughter/Kasey [phone] for dc planning. Introduced self and reason for call. Pt was admitted for AMS. Fay Whalen shares patient lives at home with her and her family and since the COVID restrictions has had difficulty. States she does not understand not being able to go into the bank nor going to the grocery as family has not been taking her due to social distancing. Explained to her APS was contacted by the ED and they may investigate the allegations and Fay Whalen confirmed understanding. Fay Whalen explained pt follows with PCP, has insurance with affordable RX co-pays and reliable transportation per Fay Whalen. Fay Whalen also shared her mother (patient daughter) Claire Mcgarry is coming to visit from [place] given the hospitalization. Discussed HHC and pt declined [facility] need nor any other discharge needs. CM remains available if needs arise.

## 2020-06-11 ENCOUNTER — APPOINTMENT (OUTPATIENT)
Dept: MRI IMAGING | Age: 80
DRG: 884 | End: 2020-06-11
Payer: MEDICARE

## 2020-06-11 LAB
ALBUMIN SERPL-MCNC: 4 GM/DL (ref 3.4–5)
ALP BLD-CCNC: 77 IU/L (ref 40–128)
ALT SERPL-CCNC: 17 U/L (ref 10–40)
ANION GAP SERPL CALCULATED.3IONS-SCNC: 9 MMOL/L (ref 4–16)
AST SERPL-CCNC: 42 IU/L (ref 15–37)
BILIRUB SERPL-MCNC: 0.3 MG/DL (ref 0–1)
BUN BLDV-MCNC: 26 MG/DL (ref 6–23)
CALCIUM SERPL-MCNC: 9.4 MG/DL (ref 8.3–10.6)
CHLORIDE BLD-SCNC: 104 MMOL/L (ref 99–110)
CO2: 23 MMOL/L (ref 21–32)
CREAT SERPL-MCNC: 0.8 MG/DL (ref 0.6–1.1)
GFR AFRICAN AMERICAN: >60 ML/MIN/1.73M2
GFR NON-AFRICAN AMERICAN: >60 ML/MIN/1.73M2
GLUCOSE BLD-MCNC: 83 MG/DL (ref 70–99)
HCT VFR BLD CALC: 38.6 % (ref 37–47)
HEMOGLOBIN: 12.8 GM/DL (ref 12.5–16)
MCH RBC QN AUTO: 28.6 PG (ref 27–31)
MCHC RBC AUTO-ENTMCNC: 33.2 % (ref 32–36)
MCV RBC AUTO: 86.2 FL (ref 78–100)
PDW BLD-RTO: 14.4 % (ref 11.7–14.9)
PLATELET # BLD: 191 K/CU MM (ref 140–440)
PMV BLD AUTO: 9.5 FL (ref 7.5–11.1)
POTASSIUM SERPL-SCNC: 4.2 MMOL/L (ref 3.5–5.1)
RBC # BLD: 4.48 M/CU MM (ref 4.2–5.4)
SODIUM BLD-SCNC: 136 MMOL/L (ref 135–145)
TOTAL PROTEIN: 6.8 GM/DL (ref 6.4–8.2)
WBC # BLD: 7.4 K/CU MM (ref 4–10.5)

## 2020-06-11 PROCEDURE — 6360000002 HC RX W HCPCS: Performed by: INTERNAL MEDICINE

## 2020-06-11 PROCEDURE — A9579 GAD-BASE MR CONTRAST NOS,1ML: HCPCS | Performed by: INTERNAL MEDICINE

## 2020-06-11 PROCEDURE — 99233 SBSQ HOSP IP/OBS HIGH 50: CPT | Performed by: NURSE PRACTITIONER

## 2020-06-11 PROCEDURE — 6360000004 HC RX CONTRAST MEDICATION: Performed by: INTERNAL MEDICINE

## 2020-06-11 PROCEDURE — 70553 MRI BRAIN STEM W/O & W/DYE: CPT

## 2020-06-11 PROCEDURE — 80053 COMPREHEN METABOLIC PANEL: CPT

## 2020-06-11 PROCEDURE — 2580000003 HC RX 258: Performed by: INTERNAL MEDICINE

## 2020-06-11 PROCEDURE — 93005 ELECTROCARDIOGRAM TRACING: CPT | Performed by: INTERNAL MEDICINE

## 2020-06-11 PROCEDURE — 85027 COMPLETE CBC AUTOMATED: CPT

## 2020-06-11 PROCEDURE — 6370000000 HC RX 637 (ALT 250 FOR IP): Performed by: NURSE PRACTITIONER

## 2020-06-11 PROCEDURE — 1200000000 HC SEMI PRIVATE

## 2020-06-11 PROCEDURE — 2580000003 HC RX 258: Performed by: NURSE PRACTITIONER

## 2020-06-11 PROCEDURE — 6360000002 HC RX W HCPCS: Performed by: NURSE PRACTITIONER

## 2020-06-11 PROCEDURE — 94761 N-INVAS EAR/PLS OXIMETRY MLT: CPT

## 2020-06-11 RX ORDER — HALOPERIDOL 5 MG/ML
5 INJECTION INTRAMUSCULAR ONCE
Status: COMPLETED | OUTPATIENT
Start: 2020-06-11 | End: 2020-06-11

## 2020-06-11 RX ADMIN — BUSPIRONE HYDROCHLORIDE 5 MG: 5 TABLET ORAL at 21:39

## 2020-06-11 RX ADMIN — HALOPERIDOL LACTATE 5 MG: 5 INJECTION INTRAMUSCULAR at 15:43

## 2020-06-11 RX ADMIN — BUSPIRONE HYDROCHLORIDE 5 MG: 5 TABLET ORAL at 10:08

## 2020-06-11 RX ADMIN — EZETIMIBE 10 MG: 10 TABLET ORAL at 10:08

## 2020-06-11 RX ADMIN — SODIUM CHLORIDE, PRESERVATIVE FREE 10 ML: 5 INJECTION INTRAVENOUS at 21:39

## 2020-06-11 RX ADMIN — ASPIRIN 81 MG 81 MG: 81 TABLET ORAL at 10:08

## 2020-06-11 RX ADMIN — GADOTERIDOL 8 ML: 279.3 INJECTION, SOLUTION INTRAVENOUS at 16:28

## 2020-06-11 RX ADMIN — SODIUM CHLORIDE, PRESERVATIVE FREE 10 ML: 5 INJECTION INTRAVENOUS at 10:08

## 2020-06-11 RX ADMIN — ENOXAPARIN SODIUM 40 MG: 40 INJECTION SUBCUTANEOUS at 10:08

## 2020-06-11 RX ADMIN — ACETAMINOPHEN 650 MG: 325 TABLET ORAL at 17:00

## 2020-06-11 ASSESSMENT — PAIN SCALES - GENERAL: PAINLEVEL_OUTOF10: 7

## 2020-06-11 ASSESSMENT — PAIN DESCRIPTION - PAIN TYPE: TYPE: ACUTE PAIN

## 2020-06-11 ASSESSMENT — PAIN DESCRIPTION - LOCATION: LOCATION: HEAD

## 2020-06-11 NOTE — PROGRESS NOTES
NP   12.5 mg at 06/10/20 1041    Or    ondansetron (ZOFRAN) injection 4 mg  4 mg Intravenous Q6H PRN Richard Ada, APRN - NP        enoxaparin (LOVENOX) injection 40 mg  40 mg Subcutaneous Daily [place] Ada, APRN - NP   40 mg at 06/11/20 1008    sodium chloride flush 0.9 % injection 10 mL  10 mL Intravenous BID Cheko Smyth MD   10 mL at 06/09/20 8312         Allergies  Allergies   Allergen Reactions    Vicodin [Hydrocodone-Acetaminophen] Other (See Comments)     Psychotic reaction       REVIEW OF SYSTEMS     Within above limitations. 14 point review of systems reviewed. Pertinent positive or negative as per HPI or otherwise negative per 14 point systems review. Reviewed 6/11/2020 at 11:00 AM    PHYSICAL EXAM       Blood pressure (!) 143/66, pulse 65, temperature 97.6 °F (36.4 °C), temperature source Oral, resp. rate 14, height 5' 3\" (1.6 m), weight 104 lb 4.4 oz (47.3 kg), SpO2 97 %. General - AAO - however only to self  Psych - Appropriate affect/speech. No agitation  Eyes - Eye lids intact. No scleral icterus  ENT - Lips wnl. External ear clear/dry/intact. No thyromegaly on inspection  Heart -  S1 and S2 present. No added HS/murmurs appreciated. Lung - Adequate air entry b/l, No crackles/wheezes appreciated  GI -  Soft. No guarding/rigidity. No hepatosplenomegaly/ascites. BS+   - No CVA/suprapubic tenderness or palpable bladder distension  Skin - Intact. No rash/petechiae/ecchymosis. Warm extremities  MSK - Joints with normal ROM.  No joint swellings      Lines/Drains/Airways/Wounds:  [unfilled]    LABS AND IMAGING   CBC  [unfilled]    Last 3 Hemoglobin  Lab Results   Component Value Date    HGB 12.8 06/11/2020    HGB 13.6 06/10/2020    HGB 13.2 06/09/2020     Last 3 WBC/ANC  Lab Results   Component Value Date    WBC 7.4 06/11/2020    WBC 8.4 06/10/2020    WBC 6.3 06/09/2020     No components found for: GRNLOCTYABS  Last 3 Platelets  No results found for:

## 2020-06-11 NOTE — PLAN OF CARE
Problem: Falls - Risk of:  Goal: Will remain free from falls  Description: Will remain free from falls  Outcome: Ongoing  Goal: Absence of physical injury  Description: Absence of physical injury  Outcome: Ongoing     Problem: Confusion - Acute:  Goal: Absence of continued neurological deterioration signs and symptoms  Description: Absence of continued neurological deterioration signs and symptoms  Outcome: Ongoing  Goal: Mental status will be restored to baseline  Description: Mental status will be restored to baseline  Outcome: Ongoing     Problem: Discharge Planning:  Goal: Ability to perform activities of daily living will improve  Description: Ability to perform activities of daily living will improve  Outcome: Ongoing  Goal: Participates in care planning  Description: Participates in care planning  Outcome: Ongoing     Problem: Injury - Risk of, Physical Injury:  Goal: Will remain free from falls  Description: Will remain free from falls  Outcome: Ongoing  Goal: Absence of physical injury  Description: Absence of physical injury  Outcome: Ongoing     Problem: Mood - Altered:  Goal: Mood stable  Description: Mood stable  Outcome: Ongoing  Goal: Absence of abusive behavior  Description: Absence of abusive behavior  Outcome: Ongoing  Goal: Verbalizations of feeling emotionally comfortable while being cared for will increase  Description: Verbalizations of feeling emotionally comfortable while being cared for will increase  Outcome: Ongoing     Problem: Psychomotor Activity - Altered:  Goal: Absence of psychomotor disturbance signs and symptoms  Description: Absence of psychomotor disturbance signs and symptoms  Outcome: Ongoing     Problem: Sensory Perception - Impaired:  Goal: Demonstrations of improved sensory functioning will increase  Description: Demonstrations of improved sensory functioning will increase  Outcome: Ongoing  Goal: Decrease in sensory misperception frequency  Description: Decrease in

## 2020-06-11 NOTE — PROGRESS NOTES
Pt continues to be aggressive, yelling, and pulling against the restraints. Making it difficult to step out of the room SHANDRA remains in place.

## 2020-06-12 LAB
EKG ATRIAL RATE: 61 BPM
EKG DIAGNOSIS: NORMAL
EKG P AXIS: 31 DEGREES
EKG P-R INTERVAL: 146 MS
EKG Q-T INTERVAL: 416 MS
EKG QRS DURATION: 82 MS
EKG QTC CALCULATION (BAZETT): 418 MS
EKG R AXIS: 7 DEGREES
EKG T AXIS: 55 DEGREES
EKG VENTRICULAR RATE: 61 BPM

## 2020-06-12 PROCEDURE — 6370000000 HC RX 637 (ALT 250 FOR IP): Performed by: NURSE PRACTITIONER

## 2020-06-12 PROCEDURE — 2580000003 HC RX 258: Performed by: INTERNAL MEDICINE

## 2020-06-12 PROCEDURE — 2580000003 HC RX 258: Performed by: NURSE PRACTITIONER

## 2020-06-12 PROCEDURE — 94761 N-INVAS EAR/PLS OXIMETRY MLT: CPT

## 2020-06-12 PROCEDURE — 97165 OT EVAL LOW COMPLEX 30 MIN: CPT

## 2020-06-12 PROCEDURE — 97530 THERAPEUTIC ACTIVITIES: CPT

## 2020-06-12 PROCEDURE — 6370000000 HC RX 637 (ALT 250 FOR IP): Performed by: PHYSICIAN ASSISTANT

## 2020-06-12 PROCEDURE — 99233 SBSQ HOSP IP/OBS HIGH 50: CPT | Performed by: NURSE PRACTITIONER

## 2020-06-12 PROCEDURE — 97162 PT EVAL MOD COMPLEX 30 MIN: CPT

## 2020-06-12 PROCEDURE — 97535 SELF CARE MNGMENT TRAINING: CPT

## 2020-06-12 PROCEDURE — 93010 ELECTROCARDIOGRAM REPORT: CPT | Performed by: INTERNAL MEDICINE

## 2020-06-12 PROCEDURE — 97116 GAIT TRAINING THERAPY: CPT

## 2020-06-12 PROCEDURE — 1200000000 HC SEMI PRIVATE

## 2020-06-12 RX ORDER — LANOLIN ALCOHOL/MO/W.PET/CERES
6 CREAM (GRAM) TOPICAL NIGHTLY PRN
Status: DISCONTINUED | OUTPATIENT
Start: 2020-06-12 | End: 2020-06-13 | Stop reason: HOSPADM

## 2020-06-12 RX ADMIN — BUSPIRONE HYDROCHLORIDE 5 MG: 5 TABLET ORAL at 09:01

## 2020-06-12 RX ADMIN — MELATONIN 6 MG: at 23:56

## 2020-06-12 RX ADMIN — BUSPIRONE HYDROCHLORIDE 5 MG: 5 TABLET ORAL at 14:10

## 2020-06-12 RX ADMIN — SODIUM CHLORIDE, PRESERVATIVE FREE 10 ML: 5 INJECTION INTRAVENOUS at 09:20

## 2020-06-12 RX ADMIN — BUSPIRONE HYDROCHLORIDE 5 MG: 5 TABLET ORAL at 20:43

## 2020-06-12 RX ADMIN — SODIUM CHLORIDE, PRESERVATIVE FREE 10 ML: 5 INJECTION INTRAVENOUS at 20:44

## 2020-06-12 RX ADMIN — EZETIMIBE 10 MG: 10 TABLET ORAL at 09:01

## 2020-06-12 ASSESSMENT — PAIN SCALES - GENERAL
PAINLEVEL_OUTOF10: 0

## 2020-06-12 ASSESSMENT — PAIN SCALES - WONG BAKER: WONGBAKER_NUMERICALRESPONSE: 0

## 2020-06-12 NOTE — PROGRESS NOTES
Physical Therapy    Facility/Department: [facility] 3E  Initial Assessment    NAME: Puneet Cornejo  : 1940  MRN: [id number]    Date of Service: 2020    Discharge Recommendations:  Home with Home health PT, 24 hour supervision or assist, S Level 1       Functional Outcome Measure:    Acute Care Index of Function (ACIF):    Score: 0.94 (0.71 or greater = appropriate for home with home PT and 24 hour supervision/assist)      Assessment   Assessment: Pt is a 70 y.o. female with medical history, surgical history, co-morbidities, and personal factors including Anterior communicating artery aneurysm and Expressive aphasia with admission for A-V aneurysm, Dementia without behavioral disturbance, Abnormal CT of brain, and Altered mental status. Prior to admission, pt was independent with functional mobility and ADLs. Examination of body systems reveals decreased endurance and impaired cognition which limit her overall functional mobility.       Prognosis: Good  Decision Making: Medium Complexity  Clinical Presentation: evolving  PT Education: Goals;Transfer Training;Equipment;PT Role;Functional Mobility Training;Plan of Care;General Safety;Gait Training;Orientation  REQUIRES PT FOLLOW UP: Yes  Activity Tolerance  Activity Tolerance: Patient Tolerated treatment well         Restrictions  Restrictions/Precautions  Restrictions/Precautions: General Precautions  Vision/Hearing  Vision: Within Functional Limits  Hearing: Within functional limits     Subjective  General  Chart Reviewed: Yes  Patient assessed for rehabilitation services?: Yes  Family / Caregiver Present: No  Follows Commands: Within Functional Limits  Pain Screening  Patient Currently in Pain: Denies  Vital Signs  Patient Currently in Pain: Denies       Orientation  Orientation  Overall Orientation Status: Impaired  Orientation Level: Oriented to person;Disoriented to place  Social/Functional History  Social/Functional History  Lives With: Reorientation, Cognitive/Perceptual Training  Safety Devices  Type of devices: All fall risk precautions in place, Bed alarm in place, Left in bed, Call light within reach, Gait belt, Nurse notified      AM-PAC Score  AM-PAC Inpatient Mobility Raw Score : 22 (06/12/20 1435)  AM-PAC Inpatient T-Scale Score : 53.28 (06/12/20 1435)  Mobility Inpatient CMS 0-100% Score: 20.91 (06/12/20 1435)  Mobility Inpatient CMS G-Code Modifier : CJ (06/12/20 1435)          Goals  Long term goals  Long term goal 1: In one week, pt will ambulate 600 feet independently  Long term goal 2: In one week, pt will independently ascend/descend 6 steps with a handrail   Long term goal 3:  In one week, pt will independently complete 3 sets of 10 reps of BLE AROM exercises in available and allowed ROM       Time In: 1045  Time Out: 1115  Total Treatment Time: 30  Timed Code Treatment Minutes: Carlos Colorado, PT, DPT  License #: [id number]

## 2020-06-12 NOTE — PROGRESS NOTES
apparent distress  Head/Eyes:  Normocephalic atraumatic, EOMI   NECK:   symmetrical, trachea midline  LUNGS: Normal Effort   CARDIOVASCULAR:  Normal rate  ABDOMEN:  non distended  MUSCULOSKELETAL:  ROM WNL  NEUROLOGIC: Alert and Oriented,  Cranial nerves II-XII are grossly intact.    SKIN:  no bruising or bleeding, normal skin color,  no redness      Data:       CBC   Recent Labs     06/10/20  0420 06/11/20  0614   WBC 8.4 7.4   HGB 13.6 12.8   HCT 41.3 38.6    191      BMP   Recent Labs     06/10/20  0420 06/11/20  0614    136   K 4.0 4.2    104   CO2 26 23   BUN 20 26*   CREATININE 0.8 0.8         Electronically signed by Eveline Biswas MD on 6/12/2020 at 9:35 AM

## 2020-06-12 NOTE — PLAN OF CARE
Problem: Restraint Use - Nonviolent/Non-Self-Destructive Behavior:  Goal: Absence of restraint indications  Description: Absence of restraint indications  6/12/2020 1140 by Chanelle Douglas RN  Outcome: Met This Shift    Problem: Falls - Risk of:  Goal: Will remain free from falls  Description: Will remain free from falls  6/12/2020 1140 by Chanelle Douglas RN  Outcome: Ongoing    Goal: Absence of physical injury  Description: Absence of physical injury  6/12/2020 1140 by Chanelle Douglas RN  Outcome: Ongoing    Problem: Confusion - Acute:  Goal: Absence of continued neurological deterioration signs and symptoms  Description: Absence of continued neurological deterioration signs and symptoms  6/12/2020 1140 by Chanelle Douglas RN  Outcome: Ongoing    Goal: Mental status will be restored to baseline  Description: Mental status will be restored to baseline  6/12/2020 1140 by Chanelle Douglas RN  Outcome: Ongoing       Problem: Discharge Planning:  Goal: Ability to perform activities of daily living will improve  Description: Ability to perform activities of daily living will improve  6/12/2020 1140 by Chanelle Douglas RN  Outcome: Ongoing    Goal: Participates in care planning  Description: Participates in care planning  6/12/2020 1140 by Chanelle Douglas RN  Outcome: Ongoing       Problem: Injury - Risk of, Physical Injury:  Goal: Will remain free from falls  Description: Will remain free from falls  6/12/2020 1140 by Chanelle Douglas RN  Outcome: Ongoing    Goal: Absence of physical injury  Description: Absence of physical injury  6/12/2020 1140 by Chanelle Douglas RN  Outcome: Ongoing       Problem: Mood - Altered:  Goal: Mood stable  Description: Mood stable  6/12/2020 1140 by Chanelle Douglas RN  Outcome: Ongoing    Goal: Absence of abusive behavior  Description: Absence of abusive behavior  6/12/2020 1140 by Chanelle Douglas RN  Outcome: Ongoing    Goal: Verbalizations of feeling emotionally comfortable while being cared for will increase  Description: Verbalizations of feeling emotionally comfortable while being cared for will increase  6/12/2020 1140 by Mey Hancock RN  Outcome: Ongoing       Problem: Psychomotor Activity - Altered:  Goal: Absence of psychomotor disturbance signs and symptoms  Description: Absence of psychomotor disturbance signs and symptoms  6/12/2020 1140 by Mey Hancock RN  Outcome: Ongoing       Problem: Sensory Perception - Impaired:  Goal: Demonstrations of improved sensory functioning will increase  Description: Demonstrations of improved sensory functioning will increase  6/12/2020 1140 by Mey Hancock RN  Outcome: Ongoing    Goal: Decrease in sensory misperception frequency  Description: Decrease in sensory misperception frequency  6/12/2020 1140 by Mey Hancock RN  Outcome: Ongoing    Goal: Able to refrain from responding to false sensory perceptions  Description: Able to refrain from responding to false sensory perceptions  6/12/2020 1140 by Mey Hancock RN    Goal: Demonstrates accurate environmental perceptions  Description: Demonstrates accurate environmental perceptions  6/12/2020 1140 by Mey Hancock RN  Outcome: Ongoing    Goal: Able to distinguish between reality-based and nonreality-based thinking  Description: Able to distinguish between reality-based and nonreality-based thinking  6/12/2020 1140 by Mey Hancock RN  Outcome: Ongoing    Goal: Able to interrupt nonreality-based thinking  Description: Able to interrupt nonreality-based thinking  6/12/2020 1140 by Mey Hancock RN  Outcome: Ongoing       Problem: Sleep Pattern Disturbance:  Goal: Appears well-rested  Description: Appears well-rested  6/12/2020 1140 by Mey Hancock RN  Outcome: Ongoing    Problem: Nutrition  Goal: Optimal nutrition therapy  6/12/2020 1140 by Mey Hancock RN  Outcome: Ongoing       Problem: Pain:  Goal: Pain level will decrease  Description: Pain level will decrease  6/12/2020 1140 by Mey Hancock

## 2020-06-12 NOTE — PROGRESS NOTES
Neurology Service Progress Note   [facility]   Patient Name: Chip Baker  : 1940        Subjective:   Seen and examined today. No acute changes  Patient actually improved mentally today  Denies Headache  Will have her follow up with Dr. Winston Akhtar outpatient for aneursym  She denies CP and SOB  No fever, neck or back pain        Past Medical History:   Diagnosis Date    Anterior communicating artery aneurysm 2020    Expressive aphasia 2020    : History reviewed. No pertinent surgical history.   Medications:  Scheduled Meds:   haloperidol  0.5 mg Oral Once    aspirin  81 mg Oral Daily    busPIRone  5 mg Oral TID    ezetimibe  10 mg Oral Daily    sodium chloride flush  10 mL Intravenous 2 times per day    enoxaparin  40 mg Subcutaneous Daily    sodium chloride flush  10 mL Intravenous BID     Continuous Infusions:  PRN Meds:.sodium chloride flush, acetaminophen **OR** acetaminophen, polyethylene glycol, promethazine **OR** ondansetron    Allergies   Allergen Reactions    Ativan [Lorazepam] Other (See Comments)     Psychotic Reaction/Combative    Vicodin [Hydrocodone-Acetaminophen] Other (See Comments)     Psychotic reaction     Social History     Socioeconomic History    Marital status: Single     Spouse name: Not on file    Number of children: Not on file    Years of education: Not on file    Highest education level: Not on file   Occupational History    Not on file   Social Needs    Financial resource strain: Not on file    Food insecurity     Worry: Not on file     Inability: Not on file    Transportation needs     Medical: Not on file     Non-medical: Not on file   Tobacco Use    Smoking status: Former Smoker     Types: Cigarettes    Smokeless tobacco: Never Used   Substance and Sexual Activity    Alcohol use: Not Currently    Drug use: Never    Sexual activity: Not Currently   Lifestyle    Physical activity     Days per week: Not on file     Minutes per session: Not on

## 2020-06-13 VITALS
BODY MASS INDEX: 17.73 KG/M2 | DIASTOLIC BLOOD PRESSURE: 55 MMHG | RESPIRATION RATE: 16 BRPM | WEIGHT: 100.09 LBS | TEMPERATURE: 97.9 F | HEART RATE: 88 BPM | HEIGHT: 63 IN | OXYGEN SATURATION: 98 % | SYSTOLIC BLOOD PRESSURE: 132 MMHG

## 2020-06-13 LAB
BACTERIA: ABNORMAL /HPF
BILIRUBIN URINE: NEGATIVE MG/DL
BLOOD, URINE: NEGATIVE
CLARITY: ABNORMAL
COLOR: YELLOW
GLUCOSE, URINE: NEGATIVE MG/DL
KETONES, URINE: NEGATIVE MG/DL
LEUKOCYTE ESTERASE, URINE: ABNORMAL
MUCUS: ABNORMAL HPF
NITRITE URINE, QUANTITATIVE: POSITIVE
PH, URINE: 7 (ref 5–8)
PROTEIN UA: NEGATIVE MG/DL
RBC URINE: 1 /HPF (ref 0–6)
SPECIFIC GRAVITY UA: 1.01 (ref 1–1.03)
SQUAMOUS EPITHELIAL: <1 /HPF
TRANSITIONAL EPITHELIAL: <1 /HPF
TRICHOMONAS: ABNORMAL /HPF
UROBILINOGEN, URINE: NORMAL MG/DL (ref 0.2–1)
WBC CLUMP: ABNORMAL /HPF
WBC UA: 94 /HPF (ref 0–5)

## 2020-06-13 PROCEDURE — 2580000003 HC RX 258: Performed by: INTERNAL MEDICINE

## 2020-06-13 PROCEDURE — 87077 CULTURE AEROBIC IDENTIFY: CPT

## 2020-06-13 PROCEDURE — 6360000002 HC RX W HCPCS: Performed by: NURSE PRACTITIONER

## 2020-06-13 PROCEDURE — 87186 SC STD MICRODIL/AGAR DIL: CPT

## 2020-06-13 PROCEDURE — 6370000000 HC RX 637 (ALT 250 FOR IP): Performed by: NURSE PRACTITIONER

## 2020-06-13 PROCEDURE — 87086 URINE CULTURE/COLONY COUNT: CPT

## 2020-06-13 PROCEDURE — 81001 URINALYSIS AUTO W/SCOPE: CPT

## 2020-06-13 PROCEDURE — 2580000003 HC RX 258: Performed by: NURSE PRACTITIONER

## 2020-06-13 RX ORDER — ASPIRIN 81 MG/1
81 TABLET, CHEWABLE ORAL DAILY
Qty: 30 TABLET | Refills: 3 | Status: SHIPPED | OUTPATIENT
Start: 2020-06-14

## 2020-06-13 RX ORDER — DIPHENHYDRAMINE HCL 25 MG
25 TABLET ORAL ONCE
Status: DISCONTINUED | OUTPATIENT
Start: 2020-06-13 | End: 2020-06-13 | Stop reason: HOSPADM

## 2020-06-13 RX ADMIN — BUSPIRONE HYDROCHLORIDE 5 MG: 5 TABLET ORAL at 08:09

## 2020-06-13 RX ADMIN — EZETIMIBE 10 MG: 10 TABLET ORAL at 08:08

## 2020-06-13 RX ADMIN — SODIUM CHLORIDE, PRESERVATIVE FREE 10 ML: 5 INJECTION INTRAVENOUS at 08:17

## 2020-06-13 RX ADMIN — ASPIRIN 81 MG 81 MG: 81 TABLET ORAL at 08:09

## 2020-06-13 RX ADMIN — ENOXAPARIN SODIUM 40 MG: 40 INJECTION SUBCUTANEOUS at 08:09

## 2020-06-13 ASSESSMENT — PAIN SCALES - GENERAL: PAINLEVEL_OUTOF10: 0

## 2020-06-13 NOTE — DISCHARGE SUMMARY
Type of Exam: Unknown Relevant Medical/Surgical History: dementia FINDINGS: Lungs are clear. Cardiac and mediastinal silhouettes are within normal limits. No pneumothoraces. Bony structures appear intact. Evidence for prior granulomatous disease. No evidence for acute cardiopulmonary process. Cta Head Neck W Contrast    Result Date: 6/8/2020  EXAMINATION: CTA OF THE HEAD AND NECK WITH CONTRAST 6/8/2020 5:46 pm: TECHNIQUE: CTA of the head and neck was performed with the administration of intravenous contrast. Multiplanar reformatted images are provided for review. MIP images are provided for review. Stenosis of the internal carotid arteries measured using NASCET criteria. Dose modulation, iterative reconstruction, and/or weight based adjustment of the mA/kV was utilized to reduce the radiation dose to as low as reasonably achievable. COMPARISON: None. HISTORY: ORDERING SYSTEM PROVIDED HISTORY: abnormal CT brain TECHNOLOGIST PROVIDED HISTORY: Reason for exam:->abnormal CT brain Reason for Exam: abnormal CT brain Acuity: Acute Type of Exam: Initial Additional signs and symptoms: no Relevant Medical/Surgical History: 70 ml isovue 370 used FINDINGS: CTA NECK: AORTIC ARCH/ARCH VESSELS: No dissection or arterial injury. No significant stenosis of the brachiocephalic or subclavian arteries. CAROTID ARTERIES: No flow limiting stenosis by NASCET criteria. There is mild beading of the left cervical ICA which may reflect fibromuscular dysplasia (FMD). No dissection or aneurysm is identified within the neck. VERTEBRAL ARTERIES: No dissection, arterial injury, or significant stenosis. SOFT TISSUES: The lung apices are clear. No cervical or superior mediastinal lymphadenopathy. The larynx and pharynx are unremarkable. No acute abnormality of the salivary and thyroid glands. BONES: No acute osseous abnormality. CTA HEAD: ANTERIOR CIRCULATION: There is a left-sided posterior communicating artery.  The middle cerebral arteries are normal in course and caliber and demonstrate normal arborization patterns. There is a 12 mm in maximum diameter anterior communicating artery aneurysm. POSTERIOR CIRCULATION: No significant stenosis of the vertebral, basilar, or posterior cerebral arteries. No aneurysm. OTHER: No dural venous sinus thrombosis on this non-dedicated study. BRAIN: See separately dictated noncontrast head CT report. CTA confirms presence of a 1.2 cm in diameter A-comm aneurysm. 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 Mri Brain W Wo Contrast    Result Date: 6/11/2020  EXAMINATION: MRI OF THE BRAIN WITHOUT AND WITH CONTRAST  6/10/2020 3:55 pm TECHNIQUE: Multiplanar multisequence MRI of the head/brain was performed without and with the administration of intravenous contrast. COMPARISON: CTA head June 8, 2020 HISTORY: ORDERING SYSTEM PROVIDED HISTORY: stroke v. mass TECHNOLOGIST PROVIDED HISTORY: Reason for exam:->stroke v. mass Reason for Exam: altered mental status FINDINGS: INTRACRANIAL STRUCTURES/VENTRICLES:  There is no acute infarct. No mass effect or midline shift. No evidence of an acute intracranial hemorrhage. The ventricles and sulci are prominent in size and configuration. The sellar/suprasellar regions appear unremarkable. The normal signal voids within the major intracranial vessels appear maintained. No abnormal focus of enhancement is seen within the brain. Moderate periventricular T2 lengthening. 8 mm A-comm aneurysm signal void. ORBITS: The visualized portion of the orbits demonstrate no acute abnormality. SINUSES: The visualized paranasal sinuses and mastoid air cells are well aerated. BONES/SOFT TISSUES: The bone marrow signal intensity appears normal. The soft tissues demonstrate no acute abnormality. 8 mm A-comm aneurysm. Moderate nonspecific white matter disease consistent with microangiopathic change.        Significant Diagnostic Studies at discharge:   CBC:   Lab Results

## 2020-06-15 LAB
CULTURE: ABNORMAL
Lab: ABNORMAL
SPECIMEN: ABNORMAL

## 2020-12-26 ENCOUNTER — HOSPITAL ENCOUNTER (EMERGENCY)
Age: 80
Discharge: HOME | End: 2020-12-26
Attending: EMERGENCY MEDICINE
Payer: MEDICARE

## 2020-12-26 VITALS
WEIGHT: 98 LBS | SYSTOLIC BLOOD PRESSURE: 144 MMHG | BODY MASS INDEX: 19.76 KG/M2 | HEART RATE: 79 BPM | DIASTOLIC BLOOD PRESSURE: 93 MMHG | HEIGHT: 59 IN | TEMPERATURE: 97.3 F

## 2020-12-26 LAB
ANION GAP SERPL CALCULATED.3IONS-SCNC: 8 MMOL/L (ref 4–16)
BACTERIA: NEGATIVE /HPF
BILIRUBIN URINE: NEGATIVE MG/DL
BLOOD, URINE: ABNORMAL
BUN BLDV-MCNC: 17 MG/DL (ref 6–23)
CALCIUM SERPL-MCNC: 9.6 MG/DL (ref 8.3–10.6)
CHLORIDE BLD-SCNC: 104 MMOL/L (ref 99–110)
CHP ED QC CHECK: NORMAL
CLARITY: CLEAR
CO2: 27 MMOL/L (ref 21–32)
COLOR: YELLOW
CREAT SERPL-MCNC: 0.6 MG/DL (ref 0.6–1.1)
GFR AFRICAN AMERICAN: >60 ML/MIN/1.73M2
GFR NON-AFRICAN AMERICAN: >60 ML/MIN/1.73M2
GLUCOSE BLD-MCNC: 114 MG/DL
GLUCOSE BLD-MCNC: 114 MG/DL (ref 70–99)
GLUCOSE BLD-MCNC: 115 MG/DL (ref 70–99)
GLUCOSE, URINE: NEGATIVE MG/DL
KETONES, URINE: NEGATIVE MG/DL
LEUKOCYTE ESTERASE, URINE: NEGATIVE
MUCUS: ABNORMAL HPF
NITRITE URINE, QUANTITATIVE: NEGATIVE
PH, URINE: 6 (ref 5–8)
POTASSIUM SERPL-SCNC: 3.7 MMOL/L (ref 3.5–5.1)
PROTEIN UA: NEGATIVE MG/DL
RBC URINE: 1 /HPF (ref 0–6)
SODIUM BLD-SCNC: 139 MMOL/L (ref 135–145)
SPECIFIC GRAVITY UA: 1.01 (ref 1–1.03)
SQUAMOUS EPITHELIAL: <1 /HPF
TRANSITIONAL EPITHELIAL: <1 /HPF
TRICHOMONAS: ABNORMAL /HPF
UROBILINOGEN, URINE: NORMAL MG/DL (ref 0.2–1)
WBC UA: 2 /HPF (ref 0–5)

## 2020-12-26 PROCEDURE — 82962 GLUCOSE BLOOD TEST: CPT

## 2020-12-26 PROCEDURE — G0480 DRUG TEST DEF 1-7 CLASSES: HCPCS

## 2020-12-26 PROCEDURE — 81001 URINALYSIS AUTO W/SCOPE: CPT

## 2020-12-26 PROCEDURE — 99284 EMERGENCY DEPT VISIT MOD MDM: CPT

## 2020-12-26 PROCEDURE — 80048 BASIC METABOLIC PNL TOTAL CA: CPT

## 2020-12-26 NOTE — ED PROVIDER NOTES
Emergency Department Encounter    Patient: Cassandra Dan  MRN: [id number]  : 1940  Date of Evaluation: 2020  ED Provider:  Ann Palmer    Triage Chief Complaint:   Other (pt has dementia. pt reportedly found walking outside at [facility] without a coat. pt refused to go home with granddaughter where she lives. daughter does not have POA and police could not make pt go with granddaughter. pt brought to ED and is pink slipped. pt has hx of dementia that has progressively gotten worse)    [place]:  Cassandra Dan is a 68 y.o. female that presents after brought in by police. She was found walking outside at [facility] without a coat. Her  called and spoke with family, she does have severe dementia and has been worsening over recent months. The last time she got out of the house like this and was so confused she had a UTI. She denies complaints, wants to leave. Apparently she refused to go home with the granddaughter where she lives and they do not have a POA and please could not make her go with the granddaughter and so they brought her in here and pink slipped her. She is not suicidal or homicidal, has known dementia with some behavioral issues. No fevers or vomiting or recent illness. ROS - see HPI, below listed is current ROS at time of my eval:  Limited 2/2 patient's dementia. Past Medical History:   Diagnosis Date    Anterior communicating artery aneurysm 2020    Expressive aphasia 2020     History reviewed. No pertinent surgical history. History reviewed. No pertinent family history.   Social History     Socioeconomic History    Marital status: Single     Spouse name: Not on file    Number of children: Not on file    Years of education: Not on file    Highest education level: Not on file   Occupational History    Not on file   Social Needs    Financial resource strain: Not on file    Food insecurity     Worry: Not on file     Inability: Not on file   [email or web address] needs     Medical: Not on file     Non-medical: Not on file   Tobacco Use    Smoking status: Former Smoker     Types: Cigarettes    Smokeless tobacco: Never Used   Substance and Sexual Activity    Alcohol use: Not Currently    Drug use: Never    Sexual activity: Not Currently   Lifestyle    Physical activity     Days per week: Not on file     Minutes per session: Not on file    Stress: Not on file   Relationships    Social connections     Talks on phone: Not on file     Gets together: Not on file     Attends Pentecostalism service: Not on file     Active member of club or organization: Not on file     Attends meetings of clubs or organizations: Not on file     Relationship status: Not on file    Intimate partner violence     Fear of current or ex partner: Not on file     Emotionally abused: Not on file     Physically abused: Not on file     Forced sexual activity: Not on file   Other Topics Concern    Not on file   Social History Narrative    Not on file     No current facility-administered medications for this encounter.       Current Outpatient Medications   Medication Sig Dispense Refill    aspirin 81 MG chewable tablet Take 1 tablet by mouth daily 30 tablet 3    Calcium Carbonate-Vitamin D 500-125 MG-UNIT TABS Take 1 tablet by mouth daily      ezetimibe (ZETIA) 10 MG tablet Take 10 mg by mouth daily      busPIRone (BUSPAR) 5 MG tablet Take 5 mg by mouth 2 times daily       omeprazole (PRILOSEC) 40 MG delayed release capsule Take 40 mg by mouth daily       Allergies   Allergen Reactions    Ativan [Lorazepam] Other (See Comments)     Psychotic Reaction/Combative    Vicodin [Hydrocodone-Acetaminophen] Other (See Comments)     Psychotic reaction       Nursing Notes Reviewed    Physical Exam:  ED Triage Vitals [12/26/20 1512]   Enc Vitals Group      BP (!) 144/93      Pulse 79      Resp       Temp 97.3 °F (36.3 °C)      Temp Source Oral      SpO2       Weight 98 lb (44.5 kg) Height 4' 11\" (1.499 m)      Head Circumference       Peak Flow       Pain Score       Pain Loc       Pain Edu? Excl. in [address]? My pulse ox interpretation is - normal    General appearance:  No acute distress. Skin:  Warm. Dry. Eye:  Extraocular movements intact. Ears, nose, mouth and throat:  Oral mucosa moist   Neck:  Trachea midline. Extremity:  No swelling. Normal ROM     Heart:  Regular rate and rhythm, normal S1 & S2, no extra heart sounds. Perfusion:  intact  Respiratory:  Lungs clear to auscultation bilaterally. Respirations nonlabored. Abdominal: Non distended. Neurological:  Alert and oriented to herself. Ambulatory, normal gait.           Psychiatric:  Appropriate    I have reviewed and interpreted all of the currently available lab results from this visit (if applicable):  Results for orders placed or performed during the hospital encounter of 12/26/20   Urinalysis   Result Value Ref Range    Color, UA YELLOW YELLOW    Clarity, UA CLEAR CLEAR    Glucose, Urine NEGATIVE NEGATIVE MG/DL    Bilirubin Urine NEGATIVE NEGATIVE MG/DL    Ketones, Urine NEGATIVE NEGATIVE MG/DL    Specific Gravity, UA 1.013 1.001 - 1.035    Blood, Urine SMALL (A) NEGATIVE    pH, Urine 6.0 5.0 - 8.0    Protein, UA NEGATIVE NEGATIVE MG/DL    Urobilinogen, Urine NORMAL 0.2 - 1.0 MG/DL    Nitrite Urine, Quantitative NEGATIVE NEGATIVE    Leukocyte Esterase, Urine NEGATIVE NEGATIVE    RBC, UA 1 0 - 6 /HPF    WBC, UA 2 0 - 5 /HPF    Bacteria, UA NEGATIVE NEGATIVE /HPF    Squam Epithel, UA <1 /HPF    Trans Epithel, UA <1 /HPF    Mucus, UA RARE (A) NEGATIVE HPF    Trichomonas, UA NONE SEEN NONE SEEN /HPF   BMP   Result Value Ref Range    Sodium 139 135 - 145 MMOL/L    Potassium 3.7 3.5 - 5.1 MMOL/L    Chloride 104 99 - 110 mMol/L    CO2 27 21 - 32 MMOL/L    Anion Gap 8 4 - 16    BUN 17 6 - 23 MG/DL    CREATININE 0.6 0.6 - 1.1 MG/DL    Glucose 115 (H) 70 - 99 MG/DL    Calcium 9.6 8.3 - 10.6 MG/DL    GFR Non-African American >60 >60 mL/min/1.73m2    GFR African American >60 >60 mL/min/1.73m2   POC Blood Glucose   Result Value Ref Range    Glucose 114 mg/dL    QC OK? ok    POCT Glucose   Result Value Ref Range    POC Glucose 114 (H) 70 - 99 MG/DL      Radiographs (if obtained):  Radiologist's Report Reviewed:  No results found. EKG (if obtained): (All EKG's are interpreted by myself in the absence of a cardiologist)      MDM:  77-year-old female with history as above presents via police after found wandering without a coat [place], has known dementia with some behavioral issues and has had some increasing issues with this over the last few months. 1433-  Trav called and was able to speak with the granddaughter, they are happy to be able to bring her home if possible, the last time she did this apparently she did have a UTI. I had ordered basic labs, initially we thought she might require placement but the family is happy to take her home and do not want her to go to a nursing home or anything like that so she will not require all of the mental health labs. Basic chemistry was ordered, point-of-care glucose initially normal.  Her urinalysis does not show any evidence of infection. Plan will be for discharge home at this time. Clinical Impression:  1. Dementia with behavioral disturbance, unspecified dementia type ([facility]      Disposition referral (if applicable):  KALI Heller CNP  [address] [facility]  [phone]          Disposition medications (if applicable):  New Prescriptions    No medications on file     ED Provider Disposition Time  DISPOSITION Decision To Discharge 12/26/2020 04:26:05 PM      Comment: Please note this report has been produced using speech recognition software and may contain errors related to that system including errors in grammar, punctuation, and spelling, as well as words and phrases that may be inappropriate.   Efforts were made to edit the dictations.         Raman Bailey MD  12/26/20 6983

## 2020-12-26 NOTE — ED NOTES
Pt granddaughter Radha Moreno, [phone]. Pt reportedly lives with granddaughter but pt refused to go home with granddaughter.  Pt has hx of dementia and pt pink slipped per SPD     Ani Evans, [place]  12/26/20 401 Andrés Dang, RN  12/26/20 4425

## 2020-12-26 NOTE — CARE COORDINATION
CM received consult from Dr Samreen Gonsalez for patient in room #24 to assist with discharge planning. CM placed telephone call to patient's granddaughter Amelia Porter [phone]. Amelia Porter states that patient resides with her and her children. States she would gladly accept patient back at home. States she would not want patient to be transferred to nursing home or any other facility. States patient has wandered before when she was diagnosed with UTI. States today patient could not remember who Amelia Porter was and refused to return home. Amelia Potrer states \"I don't know what to do\". CM provided Amelia Porter with contact information for [employer] [facility] with instructions to call on Monday. Amelia Porter verbalized understanding. Dr Samreen Gonsalez notified of information obtained. As long as lab results WNL, patient likely to be discharged home with granddaughter.

## 2021-03-07 ENCOUNTER — APPOINTMENT (OUTPATIENT)
Dept: GENERAL RADIOLOGY | Age: 81
DRG: 690 | End: 2021-03-07
Payer: MEDICARE

## 2021-03-07 ENCOUNTER — HOSPITAL ENCOUNTER (INPATIENT)
Age: 81
LOS: 2 days | Discharge: NURSING, REHAB OR LONG-TERM CARE | DRG: 690 | End: 2021-03-09
Attending: EMERGENCY MEDICINE | Admitting: STUDENT IN AN ORGANIZED HEALTH CARE EDUCATION/TRAINING PROGRAM
Payer: MEDICARE

## 2021-03-07 ENCOUNTER — APPOINTMENT (OUTPATIENT)
Dept: CT IMAGING | Age: 81
DRG: 690 | End: 2021-03-07
Payer: MEDICARE

## 2021-03-07 DIAGNOSIS — R41.82 ALTERED MENTAL STATUS, UNSPECIFIED ALTERED MENTAL STATUS TYPE: Primary | ICD-10-CM

## 2021-03-07 DIAGNOSIS — F03.90 DEMENTIA WITHOUT BEHAVIORAL DISTURBANCE, UNSPECIFIED DEMENTIA TYPE: ICD-10-CM

## 2021-03-07 PROBLEM — F03.C0 ADVANCED DEMENTIA: Status: ACTIVE | Noted: 2021-03-07

## 2021-03-07 LAB
ACETAMINOPHEN LEVEL: <5 UG/ML (ref 15–30)
ALBUMIN SERPL-MCNC: 3.9 GM/DL (ref 3.4–5)
ALCOHOL SCREEN SERUM: <0.01 %WT/VOL
ALP BLD-CCNC: 84 IU/L (ref 40–129)
ALT SERPL-CCNC: 28 U/L (ref 10–40)
AMPHETAMINES: NEGATIVE
ANION GAP SERPL CALCULATED.3IONS-SCNC: 8 MMOL/L (ref 4–16)
AST SERPL-CCNC: 29 IU/L (ref 15–37)
BARBITURATE SCREEN URINE: NEGATIVE
BASE EXCESS MIXED: 3.8 (ref 0–2.3)
BASOPHILS ABSOLUTE: 0 K/CU MM
BASOPHILS RELATIVE PERCENT: 0.4 % (ref 0–1)
BENZODIAZEPINE SCREEN, URINE: NEGATIVE
BILIRUB SERPL-MCNC: 0.2 MG/DL (ref 0–1)
BUN BLDV-MCNC: 15 MG/DL (ref 6–23)
CALCIUM SERPL-MCNC: 9.1 MG/DL (ref 8.3–10.6)
CANNABINOID SCREEN URINE: NEGATIVE
CHLORIDE BLD-SCNC: 103 MMOL/L (ref 99–110)
CO2: 27 MMOL/L (ref 21–32)
COCAINE METABOLITE: NEGATIVE
COMMENT: ABNORMAL
CREAT SERPL-MCNC: 0.6 MG/DL (ref 0.6–1.1)
DIFFERENTIAL TYPE: ABNORMAL
DOSE AMOUNT: ABNORMAL
DOSE AMOUNT: ABNORMAL
DOSE TIME: ABNORMAL
DOSE TIME: ABNORMAL
EOSINOPHILS ABSOLUTE: 0.1 K/CU MM
EOSINOPHILS RELATIVE PERCENT: 1 % (ref 0–3)
GFR AFRICAN AMERICAN: >60 ML/MIN/1.73M2
GFR NON-AFRICAN AMERICAN: >60 ML/MIN/1.73M2
GLUCOSE BLD-MCNC: 105 MG/DL (ref 70–99)
HCO3 VENOUS: 29.2 MMOL/L (ref 19–25)
HCT VFR BLD CALC: 41.9 % (ref 37–47)
HEMOGLOBIN: 13.5 GM/DL (ref 12.5–16)
IMMATURE NEUTROPHIL %: 0.3 % (ref 0–0.43)
LIPASE: 83 IU/L (ref 13–60)
LYMPHOCYTES ABSOLUTE: 1.5 K/CU MM
LYMPHOCYTES RELATIVE PERCENT: 20 % (ref 24–44)
MAGNESIUM: 1.8 MG/DL (ref 1.8–2.4)
MCH RBC QN AUTO: 28.4 PG (ref 27–31)
MCHC RBC AUTO-ENTMCNC: 32.2 % (ref 32–36)
MCV RBC AUTO: 88 FL (ref 78–100)
MONOCYTES ABSOLUTE: 0.6 K/CU MM
MONOCYTES RELATIVE PERCENT: 8.3 % (ref 0–4)
NUCLEATED RBC %: 0 %
O2 SAT, VEN: 68.7 % (ref 50–70)
OPIATES, URINE: NEGATIVE
OXYCODONE: NEGATIVE
PCO2, VEN: 46 MMHG (ref 38–52)
PDW BLD-RTO: 14.4 % (ref 11.7–14.9)
PH VENOUS: 7.41 (ref 7.32–7.42)
PHENCYCLIDINE, URINE: NEGATIVE
PLATELET # BLD: 201 K/CU MM (ref 140–440)
PMV BLD AUTO: 9.7 FL (ref 7.5–11.1)
PO2, VEN: 35 MMHG (ref 28–48)
POTASSIUM SERPL-SCNC: 3.8 MMOL/L (ref 3.5–5.1)
RBC # BLD: 4.76 M/CU MM (ref 4.2–5.4)
SALICYLATE LEVEL: <0.3 MG/DL (ref 15–30)
SEGMENTED NEUTROPHILS ABSOLUTE COUNT: 5.1 K/CU MM
SEGMENTED NEUTROPHILS RELATIVE PERCENT: 70 % (ref 36–66)
SODIUM BLD-SCNC: 138 MMOL/L (ref 135–145)
TOTAL IMMATURE NEUTOROPHIL: 0.02 K/CU MM
TOTAL NUCLEATED RBC: 0 K/CU MM
TOTAL PROTEIN: 7 GM/DL (ref 6.4–8.2)
TSH HIGH SENSITIVITY: 4.53 UIU/ML (ref 0.27–4.2)
WBC # BLD: 7.3 K/CU MM (ref 4–10.5)

## 2021-03-07 PROCEDURE — 80053 COMPREHEN METABOLIC PANEL: CPT

## 2021-03-07 PROCEDURE — 82805 BLOOD GASES W/O2 SATURATION: CPT

## 2021-03-07 PROCEDURE — 81001 URINALYSIS AUTO W/SCOPE: CPT

## 2021-03-07 PROCEDURE — 85025 COMPLETE CBC W/AUTO DIFF WBC: CPT

## 2021-03-07 PROCEDURE — 83735 ASSAY OF MAGNESIUM: CPT

## 2021-03-07 PROCEDURE — 93005 ELECTROCARDIOGRAM TRACING: CPT | Performed by: EMERGENCY MEDICINE

## 2021-03-07 PROCEDURE — G0480 DRUG TEST DEF 1-7 CLASSES: HCPCS

## 2021-03-07 PROCEDURE — 83690 ASSAY OF LIPASE: CPT

## 2021-03-07 PROCEDURE — G0378 HOSPITAL OBSERVATION PER HR: HCPCS

## 2021-03-07 PROCEDURE — 87635 SARS-COV-2 COVID-19 AMP PRB: CPT

## 2021-03-07 PROCEDURE — 1200000000 HC SEMI PRIVATE

## 2021-03-07 PROCEDURE — 99285 EMERGENCY DEPT VISIT HI MDM: CPT

## 2021-03-07 PROCEDURE — 80307 DRUG TEST PRSMV CHEM ANLYZR: CPT

## 2021-03-07 PROCEDURE — 70450 CT HEAD/BRAIN W/O DYE: CPT

## 2021-03-07 PROCEDURE — 71045 X-RAY EXAM CHEST 1 VIEW: CPT

## 2021-03-07 PROCEDURE — 84443 ASSAY THYROID STIM HORMONE: CPT

## 2021-03-08 PROBLEM — F01.518 SUBCORTICAL VASCULAR DEMENTIA WITH BEHAVIORAL DISTURBANCE: Status: ACTIVE | Noted: 2021-03-08

## 2021-03-08 PROBLEM — I69.898 OTHER SEQUELAE OF OTHER CEREBROVASCULAR DISEASE: Chronic | Status: ACTIVE | Noted: 2021-03-08

## 2021-03-08 LAB
ALBUMIN SERPL-MCNC: 4 GM/DL (ref 3.4–5)
ALP BLD-CCNC: 77 IU/L (ref 40–128)
ALT SERPL-CCNC: 24 U/L (ref 10–40)
ANION GAP SERPL CALCULATED.3IONS-SCNC: 10 MMOL/L (ref 4–16)
AST SERPL-CCNC: 26 IU/L (ref 15–37)
BACTERIA: ABNORMAL /HPF
BILIRUB SERPL-MCNC: 0.2 MG/DL (ref 0–1)
BILIRUBIN URINE: NEGATIVE MG/DL
BLOOD, URINE: ABNORMAL
BUN BLDV-MCNC: 12 MG/DL (ref 6–23)
CALCIUM OXALATE CRYSTALS: ABNORMAL /HPF
CALCIUM SERPL-MCNC: 9 MG/DL (ref 8.3–10.6)
CHLORIDE BLD-SCNC: 108 MMOL/L (ref 99–110)
CLARITY: ABNORMAL
CO2: 26 MMOL/L (ref 21–32)
COLOR: YELLOW
CREAT SERPL-MCNC: 0.7 MG/DL (ref 0.6–1.1)
GFR AFRICAN AMERICAN: >60 ML/MIN/1.73M2
GFR NON-AFRICAN AMERICAN: >60 ML/MIN/1.73M2
GLUCOSE BLD-MCNC: 89 MG/DL (ref 70–99)
GLUCOSE, URINE: NEGATIVE MG/DL
HCT VFR BLD CALC: 40.2 % (ref 37–47)
HEMOGLOBIN: 12.7 GM/DL (ref 12.5–16)
KETONES, URINE: NEGATIVE MG/DL
LEUKOCYTE ESTERASE, URINE: ABNORMAL
MCH RBC QN AUTO: 27.5 PG (ref 27–31)
MCHC RBC AUTO-ENTMCNC: 31.6 % (ref 32–36)
MCV RBC AUTO: 87.2 FL (ref 78–100)
NITRITE URINE, QUANTITATIVE: NEGATIVE
PDW BLD-RTO: 14.2 % (ref 11.7–14.9)
PH, URINE: 5 (ref 5–8)
PLATELET # BLD: 189 K/CU MM (ref 140–440)
PMV BLD AUTO: 9.7 FL (ref 7.5–11.1)
POTASSIUM SERPL-SCNC: 3.6 MMOL/L (ref 3.5–5.1)
PROTEIN UA: NEGATIVE MG/DL
RBC # BLD: 4.61 M/CU MM (ref 4.2–5.4)
RBC URINE: 1 /HPF (ref 0–6)
SARS-COV-2, NAAT: NOT DETECTED
SODIUM BLD-SCNC: 144 MMOL/L (ref 135–145)
SOURCE: NORMAL
SPECIFIC GRAVITY UA: 1.02 (ref 1–1.03)
SQUAMOUS EPITHELIAL: <1 /HPF
T4 FREE: 0.98 NG/DL (ref 0.9–1.8)
TOTAL PROTEIN: 6.3 GM/DL (ref 6.4–8.2)
TRICHOMONAS: ABNORMAL /HPF
UROBILINOGEN, URINE: NEGATIVE MG/DL (ref 0.2–1)
VITAMIN B-12: 508.5 PG/ML (ref 211–911)
WBC # BLD: 4.7 K/CU MM (ref 4–10.5)
WBC UA: 8 /HPF (ref 0–5)

## 2021-03-08 PROCEDURE — 97165 OT EVAL LOW COMPLEX 30 MIN: CPT

## 2021-03-08 PROCEDURE — 96372 THER/PROPH/DIAG INJ SC/IM: CPT

## 2021-03-08 PROCEDURE — 84439 ASSAY OF FREE THYROXINE: CPT

## 2021-03-08 PROCEDURE — G0378 HOSPITAL OBSERVATION PER HR: HCPCS

## 2021-03-08 PROCEDURE — 1200000000 HC SEMI PRIVATE

## 2021-03-08 PROCEDURE — 6360000002 HC RX W HCPCS: Performed by: STUDENT IN AN ORGANIZED HEALTH CARE EDUCATION/TRAINING PROGRAM

## 2021-03-08 PROCEDURE — 80053 COMPREHEN METABOLIC PANEL: CPT

## 2021-03-08 PROCEDURE — 2580000003 HC RX 258: Performed by: STUDENT IN AN ORGANIZED HEALTH CARE EDUCATION/TRAINING PROGRAM

## 2021-03-08 PROCEDURE — 93010 ELECTROCARDIOGRAM REPORT: CPT | Performed by: INTERNAL MEDICINE

## 2021-03-08 PROCEDURE — 99221 1ST HOSP IP/OBS SF/LOW 40: CPT | Performed by: NURSE PRACTITIONER

## 2021-03-08 PROCEDURE — 85027 COMPLETE CBC AUTOMATED: CPT

## 2021-03-08 PROCEDURE — 36415 COLL VENOUS BLD VENIPUNCTURE: CPT

## 2021-03-08 PROCEDURE — 82607 VITAMIN B-12: CPT

## 2021-03-08 PROCEDURE — 94761 N-INVAS EAR/PLS OXIMETRY MLT: CPT

## 2021-03-08 PROCEDURE — 96365 THER/PROPH/DIAG IV INF INIT: CPT

## 2021-03-08 PROCEDURE — 6370000000 HC RX 637 (ALT 250 FOR IP): Performed by: STUDENT IN AN ORGANIZED HEALTH CARE EDUCATION/TRAINING PROGRAM

## 2021-03-08 RX ORDER — ACETAMINOPHEN 325 MG/1
650 TABLET ORAL EVERY 6 HOURS PRN
Status: DISCONTINUED | OUTPATIENT
Start: 2021-03-08 | End: 2021-03-09 | Stop reason: HOSPADM

## 2021-03-08 RX ORDER — SODIUM CHLORIDE 0.9 % (FLUSH) 0.9 %
10 SYRINGE (ML) INJECTION EVERY 12 HOURS SCHEDULED
Status: DISCONTINUED | OUTPATIENT
Start: 2021-03-08 | End: 2021-03-09 | Stop reason: HOSPADM

## 2021-03-08 RX ORDER — ACETAMINOPHEN 650 MG/1
650 SUPPOSITORY RECTAL EVERY 6 HOURS PRN
Status: DISCONTINUED | OUTPATIENT
Start: 2021-03-08 | End: 2021-03-09 | Stop reason: HOSPADM

## 2021-03-08 RX ORDER — PANTOPRAZOLE SODIUM 40 MG/1
40 TABLET, DELAYED RELEASE ORAL
Status: DISCONTINUED | OUTPATIENT
Start: 2021-03-08 | End: 2021-03-09 | Stop reason: HOSPADM

## 2021-03-08 RX ORDER — ASPIRIN 81 MG/1
81 TABLET, CHEWABLE ORAL DAILY
Status: DISCONTINUED | OUTPATIENT
Start: 2021-03-08 | End: 2021-03-09 | Stop reason: HOSPADM

## 2021-03-08 RX ORDER — LANOLIN ALCOHOL/MO/W.PET/CERES
3 CREAM (GRAM) TOPICAL NIGHTLY
Status: DISCONTINUED | OUTPATIENT
Start: 2021-03-08 | End: 2021-03-09 | Stop reason: HOSPADM

## 2021-03-08 RX ORDER — BUSPIRONE HYDROCHLORIDE 5 MG/1
5 TABLET ORAL 2 TIMES DAILY
Status: DISCONTINUED | OUTPATIENT
Start: 2021-03-08 | End: 2021-03-09 | Stop reason: HOSPADM

## 2021-03-08 RX ORDER — PROMETHAZINE HYDROCHLORIDE 12.5 MG/1
12.5 TABLET ORAL EVERY 6 HOURS PRN
Status: DISCONTINUED | OUTPATIENT
Start: 2021-03-08 | End: 2021-03-09 | Stop reason: HOSPADM

## 2021-03-08 RX ORDER — SODIUM CHLORIDE 0.9 % (FLUSH) 0.9 %
10 SYRINGE (ML) INJECTION PRN
Status: DISCONTINUED | OUTPATIENT
Start: 2021-03-08 | End: 2021-03-09 | Stop reason: HOSPADM

## 2021-03-08 RX ORDER — LABETALOL HYDROCHLORIDE 5 MG/ML
5 INJECTION, SOLUTION INTRAVENOUS EVERY 4 HOURS PRN
Status: DISCONTINUED | OUTPATIENT
Start: 2021-03-08 | End: 2021-03-09 | Stop reason: HOSPADM

## 2021-03-08 RX ORDER — EZETIMIBE 10 MG/1
10 TABLET ORAL DAILY
Status: DISCONTINUED | OUTPATIENT
Start: 2021-03-08 | End: 2021-03-09 | Stop reason: HOSPADM

## 2021-03-08 RX ORDER — POLYETHYLENE GLYCOL 3350 17 G/17G
17 POWDER, FOR SOLUTION ORAL DAILY PRN
Status: DISCONTINUED | OUTPATIENT
Start: 2021-03-08 | End: 2021-03-09 | Stop reason: HOSPADM

## 2021-03-08 RX ADMIN — BUSPIRONE HYDROCHLORIDE 5 MG: 5 TABLET ORAL at 09:50

## 2021-03-08 RX ADMIN — SODIUM CHLORIDE, PRESERVATIVE FREE 10 ML: 5 INJECTION INTRAVENOUS at 21:45

## 2021-03-08 RX ADMIN — BUSPIRONE HYDROCHLORIDE 5 MG: 5 TABLET ORAL at 01:05

## 2021-03-08 RX ADMIN — EZETIMIBE 10 MG: 10 TABLET ORAL at 09:50

## 2021-03-08 RX ADMIN — Medication 3 MG: at 21:45

## 2021-03-08 RX ADMIN — CEFTRIAXONE 1000 MG: 1 INJECTION, POWDER, FOR SOLUTION INTRAMUSCULAR; INTRAVENOUS at 01:05

## 2021-03-08 RX ADMIN — ENOXAPARIN SODIUM 40 MG: 40 INJECTION SUBCUTANEOUS at 09:51

## 2021-03-08 RX ADMIN — Medication 3 MG: at 01:05

## 2021-03-08 RX ADMIN — BUSPIRONE HYDROCHLORIDE 5 MG: 5 TABLET ORAL at 21:45

## 2021-03-08 RX ADMIN — Medication 1 TABLET: at 09:50

## 2021-03-08 RX ADMIN — PANTOPRAZOLE SODIUM 40 MG: 40 TABLET, DELAYED RELEASE ORAL at 09:50

## 2021-03-08 RX ADMIN — ASPIRIN 81 MG: 81 TABLET, CHEWABLE ORAL at 09:50

## 2021-03-08 RX ADMIN — SODIUM CHLORIDE, PRESERVATIVE FREE 10 ML: 5 INJECTION INTRAVENOUS at 09:50
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
## 2021-03-08 NOTE — ED NOTES
Attempting to call report to 4N, no answer at charge RN phone [phone], RN  03/07/21 5727
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
## 2021-03-08 NOTE — CONSULTS
Initial Psychiatric History and Physical    Richford Reading  [id number]  3/7/2021  03/08/21    ID: Patient is a 68 yrs y.o. female    CC: \"He was going to put some stuff on me. \"    HPI: Peter Snowden a 68 y. o. female that presents with complaint of mental health issue.  Patient apparently has a history dementia. She lives with family they state she broke out of the house today was found wandering the streets was brought in by police, pink slipped by them. Aurea Leatha on arrival is alert she follows commands vital signs are stable she is pleasant but she is confused, demented she has no complaints but she has tangential thoughts and is making nonsensical statements.  Denies other questions or concerns.  Family trying to seek placement reportedly. Today's interview was conducted using the [employer] audiovisual machine with the [place] [address] and the interviewee located at [facility]. During today's interview, the patient was alert but only oriented to self. She denies SI/HI and AV hallucinations. She denies depression and anxiety. States her sleep and appetite are \"OK. \" Her speech is non-linear and illogical, however she was pleasant and cooperative during the interview. MRI brain 6/11/2020  FINDINGS:   INTRACRANIAL STRUCTURES/VENTRICLES: Jose E Orozco is no acute infarct. No mass   effect or midline shift. No evidence of an acute intracranial hemorrhage. The ventricles and sulci are prominent in size and configuration.  The   sellar/suprasellar regions appear unremarkable.  The normal signal voids   within the major intracranial vessels appear maintained.  No abnormal focus   of enhancement is seen within the brain.  Moderate periventricular T2   lengthening.  8 mm A-comm aneurysm signal void. Past Psychiatric History: none known    Family Psychiatric History: none known  No family history on file. Allergies:   Allergies   Allergen Reactions    Ativan [Lorazepam] Other (See Comments)     Psychotic Reaction/Combative    Vicodin [Hydrocodone-Acetaminophen] Other (See Comments)     Psychotic reaction        OBJECTIVE  Vital Signs:  Vitals:    03/08/21 0843   BP: (!) 147/72   Pulse: 70   Resp: 18   Temp: 97.8 °F (36.6 °C)   SpO2: 98%       Labs:  Recent Results (from the past 48 hour(s))   Blood Gas, Venous    Collection Time: 03/07/21  9:00 PM   Result Value Ref Range    pH, Ky 7.41 7.32 - 7.42    pCO2, Ky 46 38 - 52 mmHG    pO2, Ky 35 28 - 48 mmHG    Base Exc, Mixed 3.8 (H) 0 - 2.3    HCO3, Venous 29.2 (H) 19 - 25 MMOL/L    O2 Sat, Ky 68.7 50 - 70 %    Comment VBG    EKG 12 Lead    Collection Time: 03/07/21  9:24 PM   Result Value Ref Range    Ventricular Rate 84 BPM    Atrial Rate 84 BPM    P-R Interval 158 ms    QRS Duration 82 ms    Q-T Interval 364 ms    QTc Calculation (Bazett) 430 ms    P Axis -7 degrees    R Axis 2 degrees    T Axis 55 degrees    Diagnosis       Normal sinus rhythm  Normal ECG  When compared with ECG of 11-JUN-2020 14:45,  No significant change was found     CBC Auto Differential    Collection Time: 03/07/21  9:45 PM   Result Value Ref Range    WBC 7.3 4.0 - 10.5 K/CU MM    RBC 4.76 4.2 - 5.4 M/CU MM    Hemoglobin 13.5 12.5 - 16.0 GM/DL    Hematocrit 41.9 37 - 47 %    MCV 88.0 78 - 100 FL    MCH 28.4 27 - 31 PG    MCHC 32.2 32.0 - 36.0 %    RDW 14.4 11.7 - 14.9 %    Platelets 890 198 - 690 K/CU MM    MPV 9.7 7.5 - 11.1 FL    Differential Type AUTOMATED DIFFERENTIAL     Segs Relative 70.0 (H) 36 - 66 %    Lymphocytes % 20.0 (L) 24 - 44 %    Monocytes % 8.3 (H) 0 - 4 %    Eosinophils % 1.0 0 - 3 %    Basophils % 0.4 0 - 1 %    Segs Absolute 5.1 K/CU MM    Lymphocytes Absolute 1.5 K/CU MM    Monocytes Absolute 0.6 K/CU MM    Eosinophils Absolute 0.1 K/CU MM    Basophils Absolute 0.0 K/CU MM    Nucleated RBC % 0.0 %    Total Nucleated RBC 0.0 K/CU MM    Total Immature Neutrophil 0.02 K/CU MM    Immature Neutrophil % 0.3 0 - 0.43 %   CMP    Collection Time: 03/07/21  9:45 PM   Result Value Ref Range    Sodium 138 135 - 145 MMOL/L    Potassium 3.8 3.5 - 5.1 MMOL/L    Chloride 103 99 - 110 mMol/L    CO2 27 21 - 32 MMOL/L    BUN 15 6 - 23 MG/DL    CREATININE 0.6 0.6 - 1.1 MG/DL    Glucose 105 (H) 70 - 99 MG/DL    Calcium 9.1 8.3 - 10.6 MG/DL    Albumin 3.9 3.4 - 5.0 GM/DL    Total Protein 7.0 6.4 - 8.2 GM/DL    Total Bilirubin 0.2 0.0 - 1.0 MG/DL    ALT 28 10 - 40 U/L    AST 29 15 - 37 IU/L    Alkaline Phosphatase 84 40 - 129 IU/L    GFR Non-African American >60 >60 mL/min/1.73m2    GFR African American >60 >60 mL/min/1.73m2    Anion Gap 8 4 - 16   Lipase    Collection Time: 03/07/21  9:45 PM   Result Value Ref Range    Lipase 83 (H) 13 - 60 IU/L   Salicylate Level    Collection Time: 03/07/21  9:45 PM   Result Value Ref Range    Salicylate Lvl <7.9 (L) 15 - 30 MG/DL    DOSE AMOUNT DOSE AMT. GIVEN - UNKNOWN     DOSE TIME DOSE TIME GIVEN - UNKNOWN    Acetaminophen Level    Collection Time: 03/07/21  9:45 PM   Result Value Ref Range    Acetaminophen Level <5.0 (L) 15 - 30 ug/ml    DOSE AMOUNT DOSE AMT.  GIVEN - UNKNOWN     DOSE TIME DOSE TIME GIVEN - UNKNOWN    TSH without Reflex    Collection Time: 03/07/21  9:45 PM   Result Value Ref Range    TSH, High Sensitivity 4.530 (H) 0.270 - 4.20 uIu/ml   ETOH Blood    Collection Time: 03/07/21  9:45 PM   Result Value Ref Range    Alcohol Scrn <0.01 <0.01 %WT/VOL   Magnesium    Collection Time: 03/07/21  9:45 PM   Result Value Ref Range    Magnesium 1.8 1.8 - 2.4 mg/dl   Urinalysis    Collection Time: 03/07/21 10:28 PM   Result Value Ref Range    Color, UA YELLOW YELLOW    Clarity, UA HAZY (A) CLEAR    Glucose, Urine NEGATIVE NEGATIVE MG/DL    Bilirubin Urine NEGATIVE NEGATIVE MG/DL    Ketones, Urine NEGATIVE NEGATIVE MG/DL    Specific Gravity, UA 1.018 1.001 - 1.035    Blood, Urine SMALL (A) NEGATIVE    pH, Urine 5.0 5.0 - 8.0    Protein, UA NEGATIVE NEGATIVE MG/DL    Urobilinogen, Urine NEGATIVE 0.2 - 1.0 MG/DL    Nitrite Urine, Quantitative NEGATIVE NEGATIVE    Leukocyte Esterase, Urine SMALL (A) NEGATIVE    RBC, UA 1 0 - 6 /HPF    WBC, UA 8 (H) 0 - 5 /HPF    Bacteria, UA RARE (A) NEGATIVE /HPF    Squam Epithel, UA <1 /HPF    Trichomonas, UA NONE SEEN NONE SEEN /HPF    Ca Oxalate Karine, UA FEW /HPF   Urine Drug Screen    Collection Time: 03/07/21 10:28 PM   Result Value Ref Range    Cannabinoid Scrn, Ur NEGATIVE NEGATIVE    Amphetamines NEGATIVE NEGATIVE    Cocaine Metabolite NEGATIVE NEGATIVE    Benzodiazepine Screen, Urine NEGATIVE NEGATIVE    Barbiturate Screen, Ur NEGATIVE NEGATIVE    Opiates, Urine NEGATIVE NEGATIVE    Phencyclidine, Urine NEGATIVE NEGATIVE    Oxycodone NEGATIVE NEGATIVE   COVID-19, Rapid    Collection Time: 03/07/21 11:36 PM    Specimen: Nasopharyngeal   Result Value Ref Range    Source THROAT     SARS-CoV-2, NAAT NOT DETECTED    Comprehensive Metabolic Panel w/ Reflex to MG    Collection Time: 03/08/21  6:27 AM   Result Value Ref Range    Sodium 144 135 - 145 MMOL/L    Potassium 3.6 3.5 - 5.1 MMOL/L    Chloride 108 99 - 110 mMol/L    CO2 26 21 - 32 MMOL/L    BUN 12 6 - 23 MG/DL    CREATININE 0.7 0.6 - 1.1 MG/DL    Glucose 89 70 - 99 MG/DL    Calcium 9.0 8.3 - 10.6 MG/DL    Albumin 4.0 3.4 - 5.0 GM/DL    Total Protein 6.3 (L) 6.4 - 8.2 GM/DL    Total Bilirubin 0.2 0.0 - 1.0 MG/DL    ALT 24 10 - 40 U/L    AST 26 15 - 37 IU/L    Alkaline Phosphatase 77 40 - 128 IU/L    GFR Non-African American >60 >60 mL/min/1.73m2    GFR African American >60 >60 mL/min/1.73m2    Anion Gap 10 4 - 16   CBC    Collection Time: 03/08/21  6:27 AM   Result Value Ref Range    WBC 4.7 4.0 - 10.5 K/CU MM    RBC 4.61 4.2 - 5.4 M/CU MM    Hemoglobin 12.7 12.5 - 16.0 GM/DL    Hematocrit 40.2 37 - 47 %    MCV 87.2 78 - 100 FL    MCH 27.5 27 - 31 PG    MCHC 31.6 (L) 32.0 - 36.0 %    RDW 14.2 11.7 - 14.9 %    Platelets 323 228 - 755 K/CU MM    MPV 9.7 7.5 - 11.1 FL   Vitamin B12 Collection Time: 03/08/21  6:27 AM   Result Value Ref Range    Vitamin B-12 508.5 211 - 911 pg/ml   T4, free    Collection Time: 03/08/21  6:27 AM   Result Value Ref Range    T4 Free 0.98 0.9 - 1.8 NG/DL       Review of Systems:  Reports of no current cardiovascular, respiratory, gastrointestinal, genitourinary, integumentary, neurological, muscuoskeletal, or immunological symptoms today. PSYCHIATRIC: See HPI above. PSYCHIATRIC EXAMINATION / MENTAL STATUS EXAM    CONSTITUTIONAL:    Vitals:   Vitals:    03/08/21 0843   BP: (!) 147/72   Pulse: 70   Resp: 18   Temp: 97.8 °F (36.6 °C)   SpO2: 98%      General appearance: [x] appears age, []  appears older than stated age,               [x]  adequately dressed and groomed, [] disheveled,               [x]  in no acute distress, [] appears mildly distressed, [] other           MUSCULOSKELETAL:   Gait:   [] normal, [] antalgic, [] unsteady, [x] gait not evaluated   Station:             [] erect, [x] sitting, [] recumbent, [] other        Strength/tone:  [x] muscle strength and tone appear consistent with age and condition     [] atrophy      [] abnormal movements  PSYCHIATRIC:    Appearance: Appears stated age. Alert and oriented to person only. No acute distress. Adequate grooming and hygiene. Good eye contact. No prominent physical abnormalities. Attitude: Manner is cooperative and pleasant  Motor: Some psychomotor agitation, no retardation or abnormal movements noted  Speech: Clearly articulated; normal rate, volume, tone & amount  Language: understanding not intact, but normal to diminished production  Mood: euthymic  Affect: euthymic, normal range, non-labile, congruent with mood and content of speech  Thought Production: Spontaneous  Thought Form: Coherent, non-linear, illogical & not goal-directed. Tangentiality present but no circumstantiality. Flight of ideas but no loosening of associations.   Thought Content/Perceptions: No SANA, no AVH, no delusion Insight: impaired  Judgment: impaired  Memory: Immediate, recent, and remote do not appear intact, though not formally tested. Attention: maintained throughout interview  Fund of knowledge: unable to determine  Gait/Balance: not assessed    Impression:   Subcortical vascular dementia with behavioral disturbance  Other sequelae of other cerebrovascular disease    Problem List:   Advanced dementia ([facility] [address]    Plan:  1. Patient is an ideal candidate for the [facility] at [facility] once medically stable. 2. Agree with present medications. 3. Will follow loosely  4. Spoke with attending physician Dr. Elsie Ludwig who is agreeable with plan  5. Spoke with  Sony Moore who will discuss with patient's granddaughter  10. Dr. Travis Mckinnon agreeable with admission to unit  7. Patient will need a negative COVID, completed pink slip and RN to RN report prior to transfer    Thank you very much for the consult.     Electronically signed by Terre Cogan, APRN - CNP on 3/8/2021 at 1:24 PM

## 2021-03-08 NOTE — ED NOTES
Bed: ED-33  Expected date:   Expected time:   Means of arrival:   Comments:  flo Summers RN  03/07/21 2012

## 2021-03-08 NOTE — CONSULTS
Physical Therapy  Per physical therapy triage discharge process, pt will be discharged from caseload. After OT eval and speaking with OT, pt is ambulating and performing all functional mobility without assist. Pt would benefit from 24/7 supervision due to confusion.

## 2021-03-08 NOTE — ED PROVIDER NOTES
[facility]      TRIAGE CHIEF COMPLAINT:   Other (possible placement )      [place]:  Jeannine Ferrari is a 68 y.o. female that presents with complaint of mental health issue. Patient apparently is a history dementia she lives with family they states she broke out of the house today was found wandering the streets was brought in by police, pink slipped by them. Patient on arrival is alert she follows commands vital signs are stable she is pleasant but she is confused, demented she has no complaints but she has tangential thoughts and is making nonsense. Denies other questions or concerns. Family trying to seek placement reportedly. Lysbeth Carrel REVIEW OF SYSTEMS:    Review of Systems   Unable to perform ROS: Mental status change   Psychiatric/Behavioral: Positive for confusion. 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 No family history on file.   Social History     Socioeconomic History    Marital status: Single     Spouse name: Not on file    Number of children: Not on file    Years of education: Not on file    Highest education level: Not on file   Occupational History    Not on file   Social Needs    Financial resource strain: Not on file    Food insecurity     Worry: Not on file     Inability: Not on file    Transportation needs     Medical: Not on file     Non-medical: Not on file   Tobacco Use    Smoking status: Former Smoker     Types: Cigarettes    Smokeless tobacco: Never Used   Substance and Sexual Activity    Alcohol use: Not Currently    Drug use: Never    Sexual activity: Not Currently   Lifestyle    Physical activity     Days per week: Not on file     Minutes per session: Not on file    Stress: Not on file   Relationships    Social connections     Talks on phone: Not on file     Gets together: Not on file     Attends [employer] service: Not on file     Active member of club or organization: Not on file     Attends meetings of clubs or organizations: Not on file     Relationship status: Not on file    Intimate partner violence     Fear of current or ex partner: Not on file     Emotionally abused: Not on file     Physically abused: Not on file     Forced sexual activity: Not on file   Other Topics Concern    Not on file   Social History Narrative    Not on file     No current facility-administered medications for this encounter. Current Outpatient Medications   Medication Sig Dispense Refill    aspirin 81 MG chewable tablet Take 1 tablet by mouth daily 30 tablet 3    Calcium Carbonate-Vitamin D 500-125 MG-UNIT TABS Take 1 tablet by mouth daily      ezetimibe (ZETIA) 10 MG tablet Take 10 mg by mouth daily      busPIRone (BUSPAR) 5 MG tablet Take 5 mg by mouth 2 times daily       omeprazole (PRILOSEC) 40 MG delayed release capsule Take 40 mg by mouth daily        Allergies   Allergen Reactions    Ativan [Lorazepam] Other (See Comments)     Psychotic Reaction/Combative    Vicodin [Hydrocodone-Acetaminophen] Other (See Comments)     Psychotic reaction     No current facility-administered medications for this encounter. Current Outpatient Medications   Medication Sig Dispense Refill    aspirin 81 MG chewable tablet Take 1 tablet by mouth daily 30 tablet 3    Calcium Carbonate-Vitamin D 500-125 MG-UNIT TABS Take 1 tablet by mouth daily      ezetimibe (ZETIA) 10 MG tablet Take 10 mg by mouth daily      busPIRone (BUSPAR) 5 MG tablet Take 5 mg by mouth 2 times daily       omeprazole (PRILOSEC) 40 MG delayed release capsule Take 40 mg by mouth daily         Nursing Notes Reviewed    VITAL SIGNS:  ED Triage Vitals   Enc Vitals Group      BP       Pulse       Resp       Temp       Temp src       SpO2       Weight       Height       Head Circumference       Peak Flow       Pain Score       Pain Loc       Pain Edu? Excl.  in [address]? PHYSICAL EXAM:  Physical Exam  Vitals signs and nursing note reviewed. Constitutional:       General: She is not in acute distress. Appearance: She is well-developed and underweight. She is not ill-appearing, toxic-appearing or diaphoretic. HENT:      Head: Normocephalic and atraumatic. Neck:      Musculoskeletal: Full passive range of motion without pain and normal range of motion. Normal range of motion. No edema, erythema, neck rigidity, crepitus, injury, pain with movement or torticollis. Vascular: No JVD. Trachea: Phonation normal.   Cardiovascular:      Rate and Rhythm: Normal rate and regular rhythm. Pulses: Normal pulses. Heart sounds: Normal heart sounds. No murmur. No friction rub. No gallop. Pulmonary:      Effort: Pulmonary effort is normal.      Breath sounds: Normal breath sounds. Abdominal:      General: Bowel sounds are normal.      Palpations: Abdomen is soft. Tenderness: There is no abdominal tenderness. There is no guarding or rebound. Negative signs include Dunn's sign, Rovsing's sign and McBurney's sign. Musculoskeletal: Normal range of motion. General: No swelling, tenderness, deformity or signs of injury. Right lower leg: No edema. Left lower leg: No edema. Skin:     General: Skin is warm. Coloration: Skin is not jaundiced or pale. Findings: No bruising, erythema, lesion or rash. Neurological:      Mental Status: She is alert. She is disoriented and confused. GCS: GCS eye subscore is 4. GCS verbal subscore is 4. GCS motor subscore is 6. Cranial Nerves: Cranial nerves are intact. No cranial nerve deficit, dysarthria or facial asymmetry. Sensory: Sensation is intact. No sensory deficit. Motor: Motor function is intact. No weakness, tremor, atrophy, abnormal muscle tone or seizure activity. Coordination: Coordination is intact.  Coordination normal.   Psychiatric:         Attention and Perception: Attention normal.         Mood and Affect: Mood is anxious. Affect is blunt. Speech: Speech normal.         Behavior: Behavior is cooperative. Thought Content: Thought content is paranoid. Cognition and Memory: Memory is impaired. Judgment: Judgment is impulsive.            I have reviewed andinterpreted all of the currently available lab results from this visit (if applicable):    Results for orders placed or performed during the hospital encounter of 03/07/21   CBC Auto Differential   Result Value Ref Range    WBC 7.3 4.0 - 10.5 K/CU MM    RBC 4.76 4.2 - 5.4 M/CU MM    Hemoglobin 13.5 12.5 - 16.0 GM/DL    Hematocrit 41.9 37 - 47 %    MCV 88.0 78 - 100 FL    MCH 28.4 27 - 31 PG    MCHC 32.2 32.0 - 36.0 %    RDW 14.4 11.7 - 14.9 %    Platelets 959 765 - 592 K/CU MM    MPV 9.7 7.5 - 11.1 FL    Differential Type AUTOMATED DIFFERENTIAL     Segs Relative 70.0 (H) 36 - 66 %    Lymphocytes % 20.0 (L) 24 - 44 %    Monocytes % 8.3 (H) 0 - 4 %    Eosinophils % 1.0 0 - 3 %    Basophils % 0.4 0 - 1 %    Segs Absolute 5.1 K/CU MM    Lymphocytes Absolute 1.5 K/CU MM    Monocytes Absolute 0.6 K/CU MM    Eosinophils Absolute 0.1 K/CU MM    Basophils Absolute 0.0 K/CU MM    Nucleated RBC % 0.0 %    Total Nucleated RBC 0.0 K/CU MM    Total Immature Neutrophil 0.02 K/CU MM    Immature Neutrophil % 0.3 0 - 0.43 %   CMP   Result Value Ref Range    Sodium 138 135 - 145 MMOL/L    Potassium 3.8 3.5 - 5.1 MMOL/L    Chloride 103 99 - 110 mMol/L    CO2 27 21 - 32 MMOL/L    BUN 15 6 - 23 MG/DL    CREATININE 0.6 0.6 - 1.1 MG/DL    Glucose 105 (H) 70 - 99 MG/DL    Calcium 9.1 8.3 - 10.6 MG/DL    Albumin 3.9 3.4 - 5.0 GM/DL    Total Protein 7.0 6.4 - 8.2 GM/DL    Total Bilirubin 0.2 0.0 - 1.0 MG/DL    ALT 28 10 - 40 U/L    AST 29 15 - 37 IU/L    Alkaline Phosphatase 84 40 - 129 IU/L    GFR Non-African American >60 >60 mL/min/1.73m2    GFR African American >60 >60 mL/min/1.73m2    Anion Gap 8 4 - 16   Lipase Result Value Ref Range    Lipase 83 (H) 13 - 60 IU/L   Salicylate Level   Result Value Ref Range    Salicylate Lvl <3.9 (L) 15 - 30 MG/DL    DOSE AMOUNT DOSE AMT. GIVEN - UNKNOWN     DOSE TIME DOSE TIME GIVEN - UNKNOWN    Acetaminophen Level   Result Value Ref Range    Acetaminophen Level <5.0 (L) 15 - 30 ug/ml    DOSE AMOUNT DOSE AMT. GIVEN - UNKNOWN     DOSE TIME DOSE TIME GIVEN - UNKNOWN    TSH without Reflex   Result Value Ref Range    TSH, High Sensitivity 4.530 (H) 0.270 - 4.20 uIu/ml   ETOH Blood   Result Value Ref Range    Alcohol Scrn <0.01 <0.01 %WT/VOL   Magnesium   Result Value Ref Range    Magnesium 1.8 1.8 - 2.4 mg/dl   Blood Gas, Venous   Result Value Ref Range    pH, Ky 7.41 7.32 - 7.42    pCO2, Ky 46 38 - 52 mmHG    pO2, Ky 35 28 - 48 mmHG    Base Exc, Mixed 3.8 (H) 0 - 2.3    HCO3, Venous 29.2 (H) 19 - 25 MMOL/L    O2 Sat, Ky 68.7 50 - 70 %    Comment VBG    EKG 12 Lead   Result Value Ref Range    Ventricular Rate 93 BPM    Atrial Rate 93 BPM    P-R Interval 162 ms    QRS Duration 78 ms    Q-T Interval 366 ms    QTc Calculation (Bazett) 455 ms    P Axis 90 degrees    R Axis 4 degrees    T Axis 59 degrees    Diagnosis       Normal sinus rhythm  Normal ECG  When compared with ECG of 11-JUN-2020 14:45,  Vent. rate has increased BY  32 BPM          Radiographs (if obtained):  [] The following radiograph was interpreted by myself in the absence of a radiologist:  [x] Radiologist's Report Reviewed:    CXR, CT Brain    EKG (if obtained): (All EKG's are interpreted by myself in the absence of a cardiologist)    12 lead EKG per my interpretation:  Normal Sinus Rhythm 84  Axis is   Normal  QTc is  430  There is specific T wave changes appreciated. Inverted T wave V2  There is no specific ST wave changes appreciated. Prior EKG to compare with was not available       MDM:    Patient here with altered mental status likely dementia.   Again patient apparently was with family, she broke out of the house today was found wandering the streets. Brought in by police pink slip. Patient on arrival is pleasant and vital signs are stable no signs of trauma she is moving all extremities she is however having tangential thoughts, making no sense whatsoever. Family reportedly seeking placement for patient cannot take care of her patient cannot take care of her self otherwise patient stable will perform altered mental status work-up including labs imaging. Will consult case management, mental health patient likely needs admission for clearance, placement. Otherwise stable. Far work-up negative awaiting urinalysis otherwise work-up negative did talk to case management she needs admission for placement will consult hospital medicine admit otherwise patient stable. CLINICAL IMPRESSION:  Final diagnoses: Altered mental status, unspecified altered mental status type   Dementia without behavioral disturbance, unspecified dementia type ([facility] [address]       (Please note that portions of this note may have been completed with a voice recognition program. Efforts were made to edit the dictations but occasionally words aremis-transcribed.)    DISPOSITION REFERRAL (if applicable):  No follow-up provider specified.     DISPOSITION MEDICATIONS (if applicable):  New Prescriptions    No medications on file          [employer], [facility] [facility], DO  03/07/21 0467

## 2021-03-08 NOTE — PROGRESS NOTES
Skin assessment on admission shows no skin issues to be noted. Skin assessment done by Shady Pena RN and Sade Fajardo RN. Will keep monitoring.

## 2021-03-08 NOTE — PROGRESS NOTES
Occupational Therapy  [address] [place] [address] OCCUPATIONAL THERAPY EVALUATION    History  [place]:  The primary encounter diagnosis was Altered mental status, unspecified altered mental status type. A diagnosis of Dementia without behavioral disturbance, unspecified dementia type [facility]) was also pertinent to this visit. Restrictions:                           Communication with other providers: patient sitter    Subjective:  Patient states:  \"it feels like I need to poop\"  Pain:  none  Patient goal:  home    Occupational profile (relevant social history and personal factors):         Examination of body systems (includes body structures/functions, activity/participation limitations):  · Orientation: ox self only which is mostly likely her baseline when she is not home  · Cognition:  Advanced dementia. Follows commands very poor   · Observation:  Received pt standing in bathroom. Sitter present. No balance impairments observed. · Vision:  [employer]   · Hearing:  WFL   · ROM:  WFL BUE  · Strength: WFL BUE  · Sensation: not tested    ADLs  Feeding: INDEP    Grooming: INDEP    Dressing: UB SPENSER in seated LB SPENSER, donns shoes in standing with good balance    Bathing: UB SPENSER in seated LB SPENSER    Toileting: SPENSER    *Some ADL determined per observation of actual ADL performance, functional mobility, balance, activity tolerance, and cognition.      AM-PAC 6 click short form for inpatient daily activity:  Raw Score: 24  24/24 = unimpaired  23/24 = 1-20% impaired   20/24-22/24 = 21-40% impaired  15/24-19/24 = 41-59% impaired   10/24-14/24 = 60%-79% impaired  7/24-9/24 = 80%-99% impaired  6/24 = 100% impaired    Functional Mobility  Bed mobility:   Supine to sit: INDEP    Transfers: INDEP    Ambulation:  INDEP, household distances +400'     Activity tolerance  WFL     Assessment:  Assessment  Treatment Diagnosis: dementia  Decision Making: Low Complexity  REQUIRES OT FOLLOW UP: No  Discharge Recommendations: 24 hour supervision or assist Goals:  By d/c or goals met:      n/a    Plan:  Plan  Times per week: n/a      Recommendation for activity with nursing staff:  ambulate in halls  ambulate to bathroom for toileting    Treatment today:    N/a  Education: Role of OT, OT POC, d/c needs, home safety    Safety: Left in bed with all needs in reach. Patient sitter present    Time in:  0920  Time out:  0930  Timed treatment minutes:  0  Total treatment time:  10    Electronically signed by:    [address], OTR/L, [place]   [id number]   9:45 AM, 3/8/2021

## 2021-03-08 NOTE — PROGRESS NOTES
Hospitalist Progress Note      Name:  Eveline Ochoa /Age/Sex: 1940  (68 y.o. female)   MRN & CSN:  [id number] & [id number] Admission Date/Time: 3/7/2021  8:11 PM   [facility]  [facility] PCP: Liang Morales 112 Day: 2    Assessment and Plan:   Eveline Ochoa is a 68 y.o.  female  who presents with Advanced dementia ([facility] [address]     Advanced dementia, worsening  UTI  - Patient is pink slipped. Patient was found wandering the neighborhood knocking on people's doors. Patient was reported missing by family earlier today. - One-to-one sitter, ambulate patient daily, flight risk, minimize auditory stimulation, limit disturbances between 2200 and 0500 except for hourly rounding  - Case d/w Psychiatry; plan to admit to senior behavioral unit at [address] continue Rocephin and f/u Urine Cultures, adjust abx as appropriate     Elevated TSH  - Free T4 wnl     Other chronic medical conditions acccording to medication list:  Continue all home meds except stated above or contraindicated. · HLD  · Anxiety with depression  · GERD    Diet DIET GENERAL;   DVT Prophylaxis [] Lovenox, []  Heparin, [] SCDs, [] Ambulation   GI Prophylaxis [] PPI,  [] H2 Blocker,  [] Carafate,  [] Diet/Tube Feeds   Code Status Full Code   Disposition Patient requires continued admission due to    MDM [] Low, [] Moderate,[]  High  Patient's risk as above due to      History of Present Illness:     Doing well this morning but intermittently still confused; denies any abdominal pain, fevers, or chills. Objective:   No intake or output data in the 24 hours ending 21 1109   Vitals:   Vitals:    21 0843   BP: (!) 147/72   Pulse: 70   Resp: 18   Temp: 97.8 °F (36.6 °C)   SpO2: 98%     Physical Exam:   GEN Awake female, sitting upright in bed in no apparent distress. Appears given age. EYES Pupils are equally round. No scleral erythema, discharge, or conjunctivitis.   HENT Mucous membranes are moist  NECK Supple, no apparent thyromegaly or masses. RESP Clear to auscultation, no wheezes, rales or rhonchi. Symmetric chest movement while on room air. CARDIO/VASC S1/S2 auscultated. Regular rate without appreciable murmurs, rubs, or gallops. GI Abdomen is soft without significant tenderness, masses, or guarding   No costovertebral angle tenderness  HEME/LYMPH  No petechiae or ecchymoses. MSK No gross joint deformities. SKIN Normal coloration, warm, dry.   NEURO Cranial nerves appear grossly intact, normal speech  PSYCH Awake, alert, oriented x 2, has fluent conversation but confused at times when talking to me this morning    Medications:   Medications:    ezetimibe  10 mg Oral Daily    busPIRone  5 mg Oral BID    pantoprazole  40 mg Oral QAM AC    calcium-cholecalciferol  1 tablet Oral Daily    aspirin  81 mg Oral Daily    melatonin  3 mg Oral Nightly    sodium chloride flush  10 mL Intravenous 2 times per day    enoxaparin  40 mg Subcutaneous Daily    cefTRIAXone (ROCEPHIN) IV  1,000 mg Intravenous Q24H      Infusions:   PRN Meds: sodium chloride flush, 10 mL, PRN  promethazine, 12.5 mg, Q6H PRN  polyethylene glycol, 17 g, Daily PRN  acetaminophen, 650 mg, Q6H PRN    Or  acetaminophen, 650 mg, Q6H PRN  labetalol, 5 mg, Q4H PRN          Electronically signed by Miguel A Cordero MD on 3/8/2021 at 11:09 AM

## 2021-03-09 ENCOUNTER — HOSPITAL ENCOUNTER (INPATIENT)
Age: 81
LOS: 8 days | Discharge: NURSING, REHAB OR LONG-TERM CARE | DRG: 884 | End: 2021-03-17
Attending: PSYCHIATRY & NEUROLOGY | Admitting: PSYCHIATRY & NEUROLOGY
Payer: MEDICARE

## 2021-03-09 VITALS
DIASTOLIC BLOOD PRESSURE: 63 MMHG | TEMPERATURE: 98.8 F | RESPIRATION RATE: 16 BRPM | SYSTOLIC BLOOD PRESSURE: 136 MMHG | OXYGEN SATURATION: 98 % | HEART RATE: 61 BPM

## 2021-03-09 PROBLEM — F01.518 SUBCORTICAL VASCULAR DEMENTIA WITH BEHAVIORAL DISTURBANCE: Status: RESOLVED | Noted: 2021-03-08 | Resolved: 2021-03-09

## 2021-03-09 PROBLEM — I69.898 OTHER SEQUELAE OF OTHER CEREBROVASCULAR DISEASE: Chronic | Status: RESOLVED | Noted: 2021-03-08 | Resolved: 2021-03-09

## 2021-03-09 PROCEDURE — 96366 THER/PROPH/DIAG IV INF ADDON: CPT

## 2021-03-09 PROCEDURE — 6370000000 HC RX 637 (ALT 250 FOR IP): Performed by: NURSE PRACTITIONER

## 2021-03-09 PROCEDURE — 6360000002 HC RX W HCPCS: Performed by: PHYSICIAN ASSISTANT

## 2021-03-09 PROCEDURE — 6360000002 HC RX W HCPCS: Performed by: STUDENT IN AN ORGANIZED HEALTH CARE EDUCATION/TRAINING PROGRAM

## 2021-03-09 PROCEDURE — 1240000000 HC EMOTIONAL WELLNESS R&B

## 2021-03-09 PROCEDURE — 96372 THER/PROPH/DIAG INJ SC/IM: CPT

## 2021-03-09 PROCEDURE — 2580000003 HC RX 258: Performed by: STUDENT IN AN ORGANIZED HEALTH CARE EDUCATION/TRAINING PROGRAM

## 2021-03-09 PROCEDURE — G0378 HOSPITAL OBSERVATION PER HR: HCPCS

## 2021-03-09 RX ORDER — IBUPROFEN 600 MG/1
600 TABLET ORAL EVERY 6 HOURS PRN
Status: DISCONTINUED | OUTPATIENT
Start: 2021-03-09 | End: 2021-03-17 | Stop reason: HOSPADM

## 2021-03-09 RX ORDER — DIPHENHYDRAMINE HYDROCHLORIDE 50 MG/ML
50 INJECTION INTRAMUSCULAR; INTRAVENOUS EVERY 6 HOURS PRN
Status: DISCONTINUED | OUTPATIENT
Start: 2021-03-09 | End: 2021-03-17 | Stop reason: HOSPADM

## 2021-03-09 RX ORDER — CIPROFLOXACIN 500 MG/1
500 TABLET, FILM COATED ORAL
Status: DISCONTINUED | OUTPATIENT
Start: 2021-03-09 | End: 2021-03-10

## 2021-03-09 RX ORDER — PANTOPRAZOLE SODIUM 40 MG/1
40 TABLET, DELAYED RELEASE ORAL
Status: DISCONTINUED | OUTPATIENT
Start: 2021-03-10 | End: 2021-03-17 | Stop reason: HOSPADM

## 2021-03-09 RX ORDER — HALOPERIDOL 5 MG/ML
5 INJECTION INTRAMUSCULAR ONCE
Status: COMPLETED | OUTPATIENT
Start: 2021-03-09 | End: 2021-03-09

## 2021-03-09 RX ORDER — IBUPROFEN 400 MG/1
400 TABLET ORAL EVERY 6 HOURS PRN
Status: DISCONTINUED | OUTPATIENT
Start: 2021-03-09 | End: 2021-03-17 | Stop reason: HOSPADM

## 2021-03-09 RX ORDER — IBUPROFEN 200 MG
200 TABLET ORAL EVERY 6 HOURS PRN
Status: DISCONTINUED | OUTPATIENT
Start: 2021-03-09 | End: 2021-03-17 | Stop reason: HOSPADM

## 2021-03-09 RX ORDER — POLYETHYLENE GLYCOL 3350 17 G/17G
17 POWDER, FOR SOLUTION ORAL DAILY PRN
Status: DISCONTINUED | OUTPATIENT
Start: 2021-03-09 | End: 2021-03-17 | Stop reason: HOSPADM

## 2021-03-09 RX ORDER — HALOPERIDOL 5 MG/ML
5 INJECTION INTRAMUSCULAR EVERY 6 HOURS PRN
Status: DISCONTINUED | OUTPATIENT
Start: 2021-03-09 | End: 2021-03-17 | Stop reason: HOSPADM

## 2021-03-09 RX ORDER — BUSPIRONE HYDROCHLORIDE 5 MG/1
5 TABLET ORAL 2 TIMES DAILY
Status: DISCONTINUED | OUTPATIENT
Start: 2021-03-09 | End: 2021-03-15

## 2021-03-09 RX ORDER — CIPROFLOXACIN 500 MG/1
500 TABLET, FILM COATED ORAL 2 TIMES DAILY
Qty: 14 TABLET | Refills: 0 | Status: ON HOLD
Start: 2021-03-09 | End: 2021-03-17 | Stop reason: HOSPADM

## 2021-03-09 RX ORDER — OYSTER SHELL CALCIUM WITH VITAMIN D 500; 200 MG/1; [IU]/1
1 TABLET, FILM COATED ORAL DAILY
Status: DISCONTINUED | OUTPATIENT
Start: 2021-03-10 | End: 2021-03-17 | Stop reason: HOSPADM

## 2021-03-09 RX ORDER — EZETIMIBE 10 MG/1
10 TABLET ORAL DAILY
Status: DISCONTINUED | OUTPATIENT
Start: 2021-03-10 | End: 2021-03-17 | Stop reason: HOSPADM

## 2021-03-09 RX ORDER — ASPIRIN 81 MG/1
81 TABLET ORAL DAILY
Status: DISCONTINUED | OUTPATIENT
Start: 2021-03-10 | End: 2021-03-17 | Stop reason: HOSPADM

## 2021-03-09 RX ADMIN — IBUPROFEN 600 MG: 600 TABLET ORAL at 15:57

## 2021-03-09 RX ADMIN — CIPROFLOXACIN 500 MG: 500 TABLET, FILM COATED ORAL at 15:56

## 2021-03-09 RX ADMIN — BUSPIRONE HYDROCHLORIDE 5 MG: 5 TABLET ORAL at 20:52

## 2021-03-09 RX ADMIN — CEFTRIAXONE 1000 MG: 1 INJECTION, POWDER, FOR SOLUTION INTRAMUSCULAR; INTRAVENOUS at 02:06

## 2021-03-09 RX ADMIN — HALOPERIDOL LACTATE 5 MG: 5 INJECTION, SOLUTION INTRAMUSCULAR at 02:06

## 2021-03-09 ASSESSMENT — PAIN SCALES - GENERAL
PAINLEVEL_OUTOF10: 7
PAINLEVEL_OUTOF10: 0

## 2021-03-09 NOTE — PROGRESS NOTES
Patient received from [facility] at 12:03. Patient was initially agitated. Stated, Roseanna Sue did you do this to me? \" Confused to time, and place. Unable to state month, year or president. Unable to recall events leading to her admission. Mentioned two names. Pauline Emmanuel and Araceli Bell. She was unable to state who either is. Stated, Selena Sin are connected to me. \"   Finn Journey daughter, Mukesh Gaffney, called in to talk with patient. Patient lives with Erling Wolf spouse and three children. Shyam states Pauline Emmanuel and Araceli Bell are the patient's nieces (brother's daughters). Patient does not have a POA. Her next of kin is her daughter, Nick, who lives in [place]. Prior to coming to [facility], pt. Was brought in to [facility] by police when she was reported missing and found wandering. [place] reports patient crawled out of window to go to a \"School\"   Alan reports patient throws clothes away, layers clothes, hoards toilet paper in her clothing - especially when needing to have a B. Justin Everett reports patient get \"very wild\" if she receives ATivan.

## 2021-03-09 NOTE — PROGRESS NOTES
Belongings with patient:  Maroon glasses  Pine tree pajama bottoms  Dark grey long sleeved top  Hello striped sweater    Belongings put in locker:  Black velour pants  Black jeans  [place] nightgown   Light blue nightgown  Blue sweatshirt  9 pair short socks  Jeans  aztec pattern pajama bottoms  Maroon zip up   3 underwear  Bra  Striped sweater    (please see admission note for additional belongings)

## 2021-03-09 NOTE — DISCHARGE SUMMARY
Discharge Summary    Name:  Charles Orozco /Age/Sex: 1940  (68 y.o. female)   MRN & CSN:  [id number] & [id number] Admission Date/Time: 3/7/2021  8:11 PM   Attending:  Florencia Harman MD Discharging Physician: Florencia Harman MD     Hospital Course:   67 y/o F that presented with delerium and likely worsening dementia. Patient was found to have a UTI and started on antibiotics. Patient was also seen by Psychiatry who recommended admission to [facility] given likely advancing dementia to which family agreed. Patient had uncomplicated hospitalization and was discharged to [facility] in stable condition with continued PO antibiotics for underlying UTI. Advanced dementia, worsening  UTI  - Discharged to [facility]  - Discharged with continued PO antibiotics for UTI     Elevated TSH  - Free T4 wnl     Other chronic medical conditions acccording to medication list:  Continue all home meds except stated above or contraindicated. · HLD  · Anxiety with depression  · GERD    The patient expressed appropriate understanding of and agreement with the discharge recommendations, medications, and plan.      Consults this admission:  IP CONSULT TO PSYCHOLOGY  IP CONSULT TO CASE MANAGEMENT  IP CONSULT TO HOSPITALIST  IP CONSULT TO PSYCHIATRY  IP CONSULT TO CASE MANAGEMENT    Discharge Instruction:   Follow up appointments: PCP  Primary care physician:  within 2 weeks    Diet:  regular diet   Activity: activity as tolerated  Disposition: Discharged to:   []Home, []C, []SNF, []Acute Rehab, []Hospice   Condition on discharge: Stable    Discharge Medications:      Susana Jaquez   Home Medication Instructions [facility]:[id number]    Printed on:21 1125   Medication Information                      aspirin 81 MG chewable tablet  Take 1 tablet by mouth daily             busPIRone (BUSPAR) 5 MG tablet  Take 5 mg by mouth 2 times daily              Calcium Carbonate-Vitamin D 500-125 MG-UNIT TABS  Take 1 tablet by mouth daily ciprofloxacin (CIPRO) 500 MG tablet  Take 1 tablet by mouth 2 times daily for 4 days             ezetimibe (ZETIA) 10 MG tablet  Take 10 mg by mouth daily             omeprazole (PRILOSEC) 40 MG delayed release capsule  Take 40 mg by mouth daily                 Objective Findings at Discharge:   /63   Pulse 61   Temp 98.8 °F (37.1 °C) (Oral)   Resp 16   SpO2 98%            GEN    Awake female, sitting upright in bed in no apparent distress. Appears given age. EYES   Pupils are equally round. No scleral erythema, discharge, or conjunctivitis. HENT  Mucous membranes are moist  NECK  Supple, no apparent thyromegaly or masses. RESP  Clear to auscultation, no wheezes, rales or rhonchi. Symmetric chest movement while on room air. CARDIO/VASC           S1/S2 auscultated. Regular rate without appreciable murmurs, rubs, or gallops. GI        Abdomen is soft without significant tenderness, masses, or guarding         No costovertebral angle tenderness  HEME/LYMPH             No petechiae or ecchymoses. MSK    No gross joint deformities. SKIN    Normal coloration, warm, dry. NEURO           Cranial nerves appear grossly intact, normal speech  PSYCH            Awake, alert has fluent conversation but occasionally confused  BMP/CBC  Recent Labs     03/07/21  2145 03/08/21  0627    144   K 3.8 3.6    108   CO2 27 26   BUN 15 12   CREATININE 0.6 0.7   WBC 7.3 4.7   HCT 41.9 40.2    189       IMAGING:  CT H:  No evidence of acute intracranial process.  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        Discharge Time of 35 minutes    Electronically signed by Helen Polanco MD on 3/9/2021 at 11:24 AM

## 2021-03-09 NOTE — CARE COORDINATION
DISCHARGE BARRIERS     Reason for Referral: [facility] initial assessment  Mental Status: alert but confused  Decision Making Ability: No  Family/Social/Home Environment: Lives with family in a house  Current Services/ Equipment: none  Patient Income source:   SSI  Concerns or Barriers to Discharge: none  Patient and/or family verbalized understanding of the plan of care and contributed to goal setting.      Social/ Functional History  Lives With: family  Type of Home: house  Home Layout: 1 level  Home Access: 3 steps to enter with L side rail  Bathroom Shower/ Tub: tub/shower unit  Bathroom Toilet:  standard  Other Bathroom Assistive Devices:   Home Equipment: none  ADL Assistance : requires supervision  Homemaking Assistance:  N/A  Ambulation Assistance: independent  Transfer Assistance: independent  Additional Comments:     Anticipated Needs per Patient:   Potential Assistance Needed: f/u behavioral health care  Type of [address] N/a  Patient expects to be discharged to: Home with family     Discharge Plan:  [address] [place] [address]

## 2021-03-09 NOTE — CARE COORDINATION
Pt discharged to [facility], [place] slipped faxed to [facility], spoke with [facility] and pt approved for today. Set up Med trans for 1130. Received call from pt's daughter Allie Grace and she is agreeable with plan. Also called Grand daughter [facility] and she is also agreeable with plan.

## 2021-03-09 NOTE — GROUP NOTE
Group Therapy Note    Date: 3/9/2021    Group Start Time: 7054  Group End Time: 4815  Group Topic: Psychotherapy    [address]    PAM Barajas        Group Therapy Note    Attendees: 5/6        Notes: Group focus on gratitude, thinking of things you are grateful for.     Status After Intervention:  Unchanged    Participation Level: Minimal    Participation Quality: Attentive      Speech:  normal      Thought Process/Content: Perseverating      Affective Functioning: Flat      Mood: anxious      Level of consciousness:  Alert and Preoccupied      Response to Learning: Capable of insight      Endings: None Reported    Modes of Intervention: Support, Socialization and Exploration      Discipline Responsible: /Counselor      Signature:  PAM Ross

## 2021-03-10 LAB
CHOLESTEROL, FASTING: 157 MG/DL
EKG ATRIAL RATE: 84 BPM
EKG DIAGNOSIS: NORMAL
EKG P AXIS: -7 DEGREES
EKG P-R INTERVAL: 158 MS
EKG Q-T INTERVAL: 364 MS
EKG QRS DURATION: 82 MS
EKG QTC CALCULATION (BAZETT): 430 MS
EKG R AXIS: 2 DEGREES
EKG T AXIS: 55 DEGREES
EKG VENTRICULAR RATE: 84 BPM
ESTIMATED AVERAGE GLUCOSE: 94 MG/DL
HBA1C MFR BLD: 4.9 % (ref 4.2–6.3)
HDLC SERPL-MCNC: 65 MG/DL
LDL CHOLESTEROL DIRECT: 88 MG/DL
RPR: NON REACTIVE
TRIGLYCERIDE, FASTING: 69 MG/DL
VITAMIN D 25-HYDROXY: 40.31 NG/ML

## 2021-03-10 PROCEDURE — 6370000000 HC RX 637 (ALT 250 FOR IP): Performed by: NURSE PRACTITIONER

## 2021-03-10 PROCEDURE — 82306 VITAMIN D 25 HYDROXY: CPT

## 2021-03-10 PROCEDURE — 36415 COLL VENOUS BLD VENIPUNCTURE: CPT

## 2021-03-10 PROCEDURE — 99223 1ST HOSP IP/OBS HIGH 75: CPT | Performed by: NURSE PRACTITIONER

## 2021-03-10 PROCEDURE — 6360000002 HC RX W HCPCS: Performed by: PSYCHIATRY & NEUROLOGY

## 2021-03-10 PROCEDURE — 6360000002 HC RX W HCPCS: Performed by: NURSE PRACTITIONER

## 2021-03-10 PROCEDURE — 1240000000 HC EMOTIONAL WELLNESS R&B

## 2021-03-10 PROCEDURE — 86592 SYPHILIS TEST NON-TREP QUAL: CPT

## 2021-03-10 PROCEDURE — 80061 LIPID PANEL: CPT

## 2021-03-10 PROCEDURE — 83036 HEMOGLOBIN GLYCOSYLATED A1C: CPT

## 2021-03-10 RX ORDER — DOXEPIN HYDROCHLORIDE 10 MG/1
10 CAPSULE ORAL NIGHTLY
Status: DISCONTINUED | OUTPATIENT
Start: 2021-03-10 | End: 2021-03-17 | Stop reason: HOSPADM

## 2021-03-10 RX ORDER — DIAZEPAM 5 MG/ML
10 INJECTION, SOLUTION INTRAMUSCULAR; INTRAVENOUS ONCE
Status: COMPLETED | OUTPATIENT
Start: 2021-03-10 | End: 2021-03-10

## 2021-03-10 RX ORDER — MEMANTINE HYDROCHLORIDE 5 MG/1
5 TABLET ORAL DAILY
Status: DISCONTINUED | OUTPATIENT
Start: 2021-03-10 | End: 2021-03-17 | Stop reason: HOSPADM

## 2021-03-10 RX ORDER — CIPROFLOXACIN 250 MG/1
250 TABLET, FILM COATED ORAL
Status: COMPLETED | OUTPATIENT
Start: 2021-03-10 | End: 2021-03-13

## 2021-03-10 RX ORDER — HALOPERIDOL 5 MG/ML
5 INJECTION INTRAMUSCULAR ONCE
Status: COMPLETED | OUTPATIENT
Start: 2021-03-10 | End: 2021-03-10

## 2021-03-10 RX ORDER — DIPHENHYDRAMINE HYDROCHLORIDE 50 MG/ML
50 INJECTION INTRAMUSCULAR; INTRAVENOUS ONCE
Status: COMPLETED | OUTPATIENT
Start: 2021-03-10 | End: 2021-03-10

## 2021-03-10 RX ORDER — DIVALPROEX SODIUM 250 MG/1
250 TABLET, EXTENDED RELEASE ORAL DAILY
Status: DISCONTINUED | OUTPATIENT
Start: 2021-03-10 | End: 2021-03-11

## 2021-03-10 RX ADMIN — BUSPIRONE HYDROCHLORIDE 5 MG: 5 TABLET ORAL at 08:29

## 2021-03-10 RX ADMIN — PANTOPRAZOLE SODIUM 40 MG: 40 TABLET, DELAYED RELEASE ORAL at 06:20

## 2021-03-10 RX ADMIN — BUSPIRONE HYDROCHLORIDE 5 MG: 5 TABLET ORAL at 20:07

## 2021-03-10 RX ADMIN — DIPHENHYDRAMINE HYDROCHLORIDE 50 MG: 50 INJECTION, SOLUTION INTRAMUSCULAR; INTRAVENOUS at 20:27

## 2021-03-10 RX ADMIN — CALCIUM CARBONATE-VITAMIN D TAB 500 MG-200 UNIT 1 TABLET: 500-200 TAB at 13:17

## 2021-03-10 RX ADMIN — DOXEPIN HYDROCHLORIDE 10 MG: 10 CAPSULE ORAL at 20:07

## 2021-03-10 RX ADMIN — DIVALPROEX SODIUM 250 MG: 250 TABLET, EXTENDED RELEASE ORAL at 15:01

## 2021-03-10 RX ADMIN — DIAZEPAM 10 MG: 5 INJECTION, SOLUTION INTRAMUSCULAR; INTRAVENOUS at 20:27

## 2021-03-10 RX ADMIN — CIPROFLOXACIN HYDROCHLORIDE 250 MG: 250 TABLET, FILM COATED ORAL at 15:39

## 2021-03-10 RX ADMIN — MEMANTINE HYDROCHLORIDE 5 MG: 5 TABLET ORAL at 15:02

## 2021-03-10 RX ADMIN — EZETIMIBE 10 MG: 10 TABLET ORAL at 08:29

## 2021-03-10 RX ADMIN — HALOPERIDOL LACTATE 5 MG: 5 INJECTION, SOLUTION INTRAMUSCULAR at 18:35

## 2021-03-10 RX ADMIN — ASPIRIN 81 MG: 81 TABLET, FILM COATED ORAL at 08:29

## 2021-03-10 RX ADMIN — IBUPROFEN 600 MG: 600 TABLET ORAL at 13:18

## 2021-03-10 RX ADMIN — CIPROFLOXACIN 500 MG: 500 TABLET, FILM COATED ORAL at 06:21

## 2021-03-10 RX ADMIN — HALOPERIDOL LACTATE 5 MG: 5 INJECTION, SOLUTION INTRAMUSCULAR at 20:27

## 2021-03-10 RX ADMIN — DIPHENHYDRAMINE HYDROCHLORIDE 50 MG: 50 INJECTION, SOLUTION INTRAMUSCULAR; INTRAVENOUS at 18:35

## 2021-03-10 ASSESSMENT — PAIN SCALES - GENERAL: PAINLEVEL_OUTOF10: 7

## 2021-03-10 NOTE — GROUP NOTE
Group Therapy Note    Date: 3/10/2021    Group Start Time: 0886  Group End Time: 3050  Group Topic: Psychotherapy    [address]    PAM Pena        Group Therapy Note    Attendees: 3/7         Notes:  Patient attended group discussion on coping with anxiety. Relaxation exercise also practiced during this group. Status After Intervention:  Improved    Participation Level:  Active Listener and Minimal    Participation Quality: Attentive      Speech:  normal      Thought Process/Content: Perseverating      Affective Functioning: Congruent      Mood: anxious      Level of consciousness:  Alert and Attentive      Response to Learning: Capable of insight      Endings: None Reported    Modes of Intervention: Education, Support, Socialization and Exploration      Discipline Responsible: /Counselor      Signature:  PAM Tse

## 2021-03-10 NOTE — GROUP NOTE
Group Therapy Note    Date: 3/10/2021    Group Start Time: 0830  Group End Time: 0900    Number of Participants: 7/7    Type: Morning Goals Group/ Community Meeting    Group Topic/Objective: Set Goal For The Day and to review Unit Rules and Regulations. Patient's Goal:  Pt unable to give a goal.     Notes:  Pt presented as disruptive in group and required frequent redirection. Pt states she is feeling \"good\" and is unable to state anything she is grateful for. Pt reports restful sleep, but unable to state how long she slept.      Depression (0-10): 0    Anxiety (0-10): 0    Irritability/Aggitation (0-10): 0    Status After Intervention:  Unchanged    Participation Level: Monopolizing    Participation Quality: Intrusive    Speech:  normal    Thought Process/Content: Flight of ideas    Affective Functioning: Congruent    Mood: Bright    Level of consciousness:  Preoccupied and Inattentive    Response to Learning: Able to verbalize current knowledge/experience    Endings: None Reported    Modes of Intervention: Education, Support and Socialization    Discipline Responsible: Certified Therapeutic Recreation Specialist     Electronically signed by Aayush Baker Blayne Morel MA on 3/10/2021 at 9:31 AM

## 2021-03-10 NOTE — GROUP NOTE
Group Therapy Note    Date: 3/10/2021    Group Start Time: 7127  Group End Time: 0231    Number of Participants: 5/7    Type: Exercise/Recreation Group    Group Topic/Objective: Chair Exercises    Notes:  Pt attended group, but did not participate. Pt unable to follow 1 step instructions or demonstration. Status After Intervention:  Unchanged    Participation Level: None    Participation Quality: Pt unable to process group. Speech:  normal    Thought Process/Content: Confused    Affective Functioning: Blunted    Mood: Blunted    Level of consciousness:  Preoccupied    Response to Learning: Pt unable to demonstrate response to learning.      Endings: None Reported    Modes of Intervention: Activity and Movement    Discipline Responsible: Certified Therapeutic Recreation Specialist     Electronically signed by Zheng Warner MA on 3/10/2021 at 4:01 PM

## 2021-03-10 NOTE — PLAN OF CARE
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

## 2021-03-10 NOTE — BH NOTE
Pt calm and cooperative during assessment. Oriented to self only. Pt found to roam into other pts rooms and close the door. She was found to have another pts glasses and drink. Confused as to what room was hers. Pt redirectable. Denies SI/HI/AVH and depression. Pt appears anxious at times but unable to rate it on scale of 1-10. Med compliant. Pt has been to the bathroom frequently during the night. Pt frequently needs direction as to where the bathroom is and what she needs to do once she is in there. At one point pt asked this nurse \"what do I do to get it out? \" when she was sitting on the toilet,  as if she didn't know how to urinate. Pt only voiding small amounts at a time. Will continue to monitor pt's safety while on the [facility].

## 2021-03-10 NOTE — CONSULTS
thyromegaly or adenopathy with full range of motion. Lungs: Clear to ascultation, bilaterally without Rales/Wheezes/Rhonchi with good respiratory effort. Heart: Regular rate and rhythm with Normal S1/S2 without  murmurs, rubs or gallops, point of maximum impulse non-displaced  Abdomen: Soft, non-tender or non-distended without rigidity or guarding and positive bowel sounds all four quadrants. Extremities: No clubbing, cyanosis, or edema bilaterally. Skin: Skin color, texture, turgor normal.  No rashes or lesions. Neurologic: neurovascularly intact with sensory/motor intact upper extremities/lower extremities, bilaterally. Cranial nerves:II-XII intact, grossly non-focal.      DATA:    CBC   Recent Labs     03/07/21 2145 03/08/21 0627   WBC 7.3 4.7   HGB 13.5 12.7   HCT 41.9 40.2    189      BMP   Recent Labs     03/07/21 2145 03/08/21 0627    144   K 3.8 3.6    108   CO2 27 26   BUN 15 12   CREATININE 0.6 0.7     LFT'S   Recent Labs     03/07/21 2145 03/08/21 0627   AST 29 26   ALT 28 24   BILITOT 0.2 0.2   ALKPHOS 84 77     COAG No results for input(s): INR in the last 72 hours. CARDIAC ENZYMES  No results for input(s): CKTOTAL, CKMB, CKMBINDEX, TROPONINI in the last 72 hours. U/A:    Lab Results   Component Value Date    COLORU YELLOW 03/07/2021    WBCUA 8 03/07/2021    RBCUA 1 03/07/2021    MUCUS RARE 12/26/2020    BACTERIA RARE 03/07/2021    CLARITYU HAZY 03/07/2021    SPECGRAV 1.018 03/07/2021    LEUKOCYTESUR SMALL 03/07/2021    BLOODU SMALL 03/07/2021       CXR:  I have reviewed the CXR with the following interpretation:  EKG:  I have reviewed the EKG with the following interpretation:    IMPRESSION/RECOMMENDATIONS:     1. uti  - continue cipro, urine culture results pending. 2. Borderline elevation in TSH  - repeat outpatient, appears otherwise euthyroid. - could be related to malnutrition  3.  BMI 16.67, underweight, severe protein calorie malnutrition  - nutrition supplementation        Patient Active Problem List   Diagnosis    AMS (altered mental status)    Severe malnutrition (HCC)    Expressive aphasia    Anterior communicating artery aneurysm    Advanced dementia ([facility] [address]    Subcortical vascular dementia with behavioral disturbance (HCC)    Other sequelae of other cerebrovascular disease       Smith Lee M.D.

## 2021-03-10 NOTE — PLAN OF CARE
Problem: Falls - Risk of:  Goal: Will remain free from falls  Description: Will remain free from falls  Outcome: Ongoing  Goal: Absence of physical injury  Description: Absence of physical injury  Outcome: Ongoing     Problem: Health Behavior:  Goal: Identification of resources available to assist in meeting health care needs will improve  Description: Identification of resources available to assist in meeting health care needs will improve  Outcome: Ongoing     Problem: Role Relationship:  Goal: Ability to participate appropriately in conversations will improve  Description: Ability to participate appropriately in conversations will improve  Outcome: Ongoing     Problem: Safety:  Goal: Ability to remain free from injury will improve  Description: Ability to remain free from injury will improve  Outcome: Ongoing     Problem: Self-Care:  Goal: Ability to participate in self-care as condition permits will improve  Description: Ability to participate in self-care as condition permits will improve  Outcome: Ongoing

## 2021-03-10 NOTE — PLAN OF CARE
Problem: Falls - Risk of:  Goal: Will remain free from falls  Description: Will remain free from falls  3/10/2021 0922 by Laura Ovalle RN  Outcome: Ongoing  3/9/2021 2210 by Sarah Fontaine RN  Outcome: Ongoing  Goal: Absence of physical injury  Description: Absence of physical injury  3/10/2021 0922 by Laura Ovalle RN  Outcome: Ongoing  3/9/2021 2210 by Sarah Fontaine RN  Outcome: Ongoing     Problem: Health Behavior:  Goal: Identification of resources available to assist in meeting health care needs will improve  Description: Identification of resources available to assist in meeting health care needs will improve  3/10/2021 0922 by Laura Ovalle RN  Outcome: Ongoing  3/9/2021 2210 by Sarah Fontaine RN  Outcome: Ongoing     Problem: Role Relationship:  Goal: Ability to participate appropriately in conversations will improve  Description: Ability to participate appropriately in conversations will improve  3/10/2021 0922 by Laura Ovalle RN  Outcome: Ongoing  3/9/2021 2210 by Sarah Fontaine RN  Outcome: Ongoing     Problem: Safety:  Goal: Ability to remain free from injury will improve  Description: Ability to remain free from injury will improve  3/10/2021 0922 by Laura Ovalle RN  Outcome: Ongoing  3/9/2021 2210 by Sarah Fontaine RN  Outcome: Ongoing     Problem: Self-Care:  Goal: Ability to participate in self-care as condition permits will improve  Description: Ability to participate in self-care as condition permits will improve  3/10/2021 0922 by Laura Ovalle RN  Outcome: Ongoing  3/9/2021 2210 by Sarah Fontaine RN  Outcome: Ongoing

## 2021-03-10 NOTE — H&P
Initial Psychiatric History and Physical    [facility]  [id number]  3/9/2021  03/10/21    ID: Patient is a 68 yrs y.o. female    CC: \"I don't know why I'm here. \"    HPI: Fredo Jolly a 68 y. o. female that presents with complaint of mental health issue.  Patient apparently has a history dementia. She lives with family they state she broke out of the house and was found wandering the streets was brought in by police, pink slipped by them. Patient on arrival is alert she follows commands vital signs are stable she is pleasant but she is confused, demented. She has no complaints but she has tangential thoughts and is making nonsensical statements.  Denies other questions or concerns.  Family trying to seek placement reportedly. Patient was seen in consultation on 3/8/2021 and decision was made to [facility] the [facility] once medically stable. Family was in agreement with plan. During today's interview, the patient was alert but only oriented to self. She denies SI/HI and AV hallucinations. She endorses depression and anxiety, but is unable to rate them on a numeric scale. States her sleep and appetite are \"OK. \" States she is  and has 3 children but cannot think of their names. When asked if she graduated from high school, responded \"yes. \" She was fixated throughout the interview on finding something she had lost. Accompanied patient to her room, but due to her expressive aphasia she was unable to verbalize what she is missing. Her speech is non-linear and illogical, however she was pleasant and cooperative during the interview.     MRI brain 6/11/2020  FINDINGS:   INTRACRANIAL STRUCTURES/VENTRICLES: Lawerance Reichmann is no acute infarct. No mass   effect or midline shift. No evidence of an acute intracranial hemorrhage.    The ventricles and sulci are prominent in size and configuration.  The   sellar/suprasellar regions appear unremarkable.  The normal signal voids within the major intracranial vessels appear maintained.  No abnormal focus   of enhancement is seen within the brain.  Moderate periventricular T2   lengthening.  8 mm A-comm aneurysm signal void.      Pt denied any hx of seizures, TBIs, Hep C or HIV  No TD noted, AIMS=0    Pt denied hx of previous inpt psychiatric admissions  Pt denied any previous suicide attempts  Pt denied any family hx of suicides  Pt denied any family mental health hx  Pt denied any hx of abuse trauma or neglect. Alcohol: denies  Street drugs: denies  Tobacco: none (former smoker)  Caffeine: denies    Past Psychiatric History:   None known    Family Psychiatric History:   None known    Family Psychiatric History:   History reviewed. No pertinent family history. Allergies: Allergies   Allergen Reactions    Ativan [Lorazepam] Other (See Comments)     Psychotic Reaction/Combative    Vicodin [Hydrocodone-Acetaminophen] Other (See Comments)     Psychotic reaction        OBJECTIVE  Vital Signs:  Vitals:    03/10/21 0813   BP: 138/80   Pulse: 94   Resp: 17   Temp: 98.3 °F (36.8 °C)   SpO2: 92%       Labs:  No results found for this or any previous visit (from the past 48 hour(s)). Review of Systems:  Reports of no current cardiovascular, respiratory, gastrointestinal, genitourinary, integumentary, neurological, muscuoskeletal, or immunological symptoms today. PSYCHIATRIC: See HPI above. Neurologic examination    Mental status: The patient is alert, attentive, and oriented to self. Speech is clear but not fluent, repetition is good, but comprehension, and naming are significantly diminished. Cranial nerves:  CN II: Visual fields are full to confrontation. Pupils are 4 mm and briskly reactive to light. CN III, IV, VI: At primary gaze, there is no eye deviation. EOMs intact. CN V: Facial sensation is intact to pinprick in all 3 divisions bilaterally. Corneal responses are intact.     CN VII: Face is symmetric with normal eye closure and smile. CN VII: Hearing is normal to rubbing fingers    CN IX, X: Palate elevates symmetrically. Phonation is normal.    CN XI: Head turning and shoulder shrug are intact    CN XII: Tongue is midline with normal movements and no atrophy. Motor: There is no pronator drift of out-stretched arms. Muscle bulk decreased but tone is normal. Strength is full bilaterally. Reflexes:  Reflexes are 2+ and symmetric at the biceps, triceps, knees, and ankles. Plantar responses are flexor. Sensory:  Light touch, pinprick, position sense, and vibration sense are intact in fingers. Coordination:  Rapid alternating movements and fine finger movements are intact. There is no dysmetria on finger-to-nose and heel-knee-shin. There are no abnormal or extraneous movements. Romberg is absent. Gait/Stance:  Posture is normal. Gait is steady with normal steps, base, arm swing, and turning. Heel and toe walking are normal.      PSYCHIATRIC EXAMINATION / MENTAL STATUS EXAM    CONSTITUTIONAL:    Vitals:   Vitals:    03/10/21 0813   BP: 138/80   Pulse: 94   Resp: 17   Temp: 98.3 °F (36.8 °C)   SpO2: 92%      General appearance: [x] appears age, []  appears older than stated age,               [x]  adequately dressed and groomed, [] disheveled,               []  in no acute distress, [x] appears mildly distressed, [] other           MUSCULOSKELETAL:   Gait:   [x] normal, [] antalgic, [] unsteady, [] gait not evaluated   Station:             [] erect, [] sitting, [] recumbent, [] other        Strength/tone:  [x] muscle strength and tone appear consistent with age and condition     [] atrophy      [] abnormal movements  PSYCHIATRIC:    Appearance: Appears stated age. Alert and oriented to person, place, time & situation. No acute distress. Adequate grooming and hygiene. Good eye contact. No prominent physical abnormalities. Attitude:  Manner is cooperative and pleasant  Motor: No psychomotor agitation, retardation or abnormal movements noted  Speech: Clearly articulated; normal rate, volume, tone & amount. Language: understanding impaired, but production normal  Mood: depressed, anxious  Affect: anxious, decreased range, non-labile, congruent with mood and content of speech  Thought Production: Spontaneous. Thought Form: Coherent, non-linear, illogical & and not goal-directed. Tangentiality present, no circumstantiality. No flight of ideas or loosening of associations. Thought Content/Perceptions: No SANA, no AVH, delusion that she has lost something  Insight: impaired  Judgment: impaired  Memory: Immediate, recent, and remote do not appear intact, though not formally tested. Attention: maintained fairly well throughout interview  Fund of knowledge: Average  Gait/Balance: WNL/WNL    Impression:   Subcortical vascular dementia with behavioral disturbance  Other sequelae of other cerebrovascular disease    Problem List:   Subcortical vascular dementia with behavioral disturbance ([facility] [address]    Plan:  1. Admit to [place] [address] Unit  2. Consult Hospitalist to evaluate and treat medical conditions  3. Adjust psychotropic medications to target symptoms  4. Occupational Therapy, Physical Therapy, Group Psychotherapy as tolerated  5. Reviewed treatment plan with patient including medication risks, benefits, side effects. Obtained informed consent for treatment  6. Anticipated length of stay 10-12 days  7. I certify that inpatient psychiatric hospital admission is medically necessary for treatment, which can be expected to improve the patient's condition, and/or for diagnostic study  8. Psychiatric management:medication initiation and titration, group and individual therapy, safe and therapeutic environment  9. Status of problem/condition: Improving  10. Medical co-morbidities: Management per [facility] group, appreciate assistance  11. Legal Status: Pending  12.  The treatment team reviewed with the patient the diagnosis and treatment recommendations to include the risks, benefits, and side effects of chosen medications  13. The patient verbalized understanding and agreed with the treatment regimen as outlined above  14. The patient was encouraged to participate in groups  15. Medical records, Labs, Diagnotic tests reviewed  16. q15 min safety checks for safety  17. Interval History  18. Review current labs  19. Continue current medications  20. Supportive Therapy Provided  21. Pt had an opportunity to ask questions and address concerns  22. Pt encouraged to continue therapy group or individual  23. Pt was in agreement with treatment plan  24. The risks benefits and side effects of medications were discussed with the patient, including alternatives and no treatment.     Electronically signed by KALI Garcia CNP on 3/10/2021 at 9:59 AM

## 2021-03-10 NOTE — PROGRESS NOTES
Patient confused to time, place and situation. She does know that Jaida Parekh is her grand daughter. Unable to state where she lives or who she lives with. Patient requiring frequent redirection and/ or reorientation. Needing frequent assistance to find the bathroom and find glasses. Patient increasingly agitated and unwilling to rest through the afternoon. Patient informed she would be receiving room mate (new admission coming). Patient angry and verbally abusive to another patient who was to be her roommate. Exchanged room mate to another for safety and to prevent name calling. 200 - Patient pacing halls, anxious, angry, upsetting other patients. Refusing to rest. Security to bedside. Assisted patient to bathroom,  Bed and into night clothes. Haldol and Benadryl IM given. Patient watched closely. Bed alarm on. Patient climbing out of bed several times. Comfort measures and snacks given. Room mate in room. Patient accepting of roommate. States, \"oh I like her. \"

## 2021-03-11 PROCEDURE — 6370000000 HC RX 637 (ALT 250 FOR IP): Performed by: NURSE PRACTITIONER

## 2021-03-11 PROCEDURE — 6360000002 HC RX W HCPCS: Performed by: PSYCHIATRY & NEUROLOGY

## 2021-03-11 PROCEDURE — 1240000000 HC EMOTIONAL WELLNESS R&B

## 2021-03-11 PROCEDURE — 2580000003 HC RX 258: Performed by: PSYCHIATRY & NEUROLOGY

## 2021-03-11 RX ORDER — DIAZEPAM 5 MG/ML
5 INJECTION, SOLUTION INTRAMUSCULAR; INTRAVENOUS EVERY 4 HOURS PRN
Status: DISCONTINUED | OUTPATIENT
Start: 2021-03-11 | End: 2021-03-11

## 2021-03-11 RX ORDER — ZIPRASIDONE MESYLATE 20 MG/ML
20 INJECTION, POWDER, LYOPHILIZED, FOR SOLUTION INTRAMUSCULAR ONCE
Status: COMPLETED | OUTPATIENT
Start: 2021-03-11 | End: 2021-03-11

## 2021-03-11 RX ORDER — DIAZEPAM 5 MG/ML
10 INJECTION, SOLUTION INTRAMUSCULAR; INTRAVENOUS ONCE
Status: COMPLETED | OUTPATIENT
Start: 2021-03-11 | End: 2021-03-11

## 2021-03-11 RX ORDER — DIVALPROEX SODIUM 250 MG/1
250 TABLET, EXTENDED RELEASE ORAL 2 TIMES DAILY
Status: DISCONTINUED | OUTPATIENT
Start: 2021-03-12 | End: 2021-03-13

## 2021-03-11 RX ADMIN — DIVALPROEX SODIUM 250 MG: 250 TABLET, EXTENDED RELEASE ORAL at 11:37

## 2021-03-11 RX ADMIN — EZETIMIBE 10 MG: 10 TABLET ORAL at 11:37

## 2021-03-11 RX ADMIN — MEMANTINE HYDROCHLORIDE 5 MG: 5 TABLET ORAL at 11:37

## 2021-03-11 RX ADMIN — ZIPRASIDONE MESYLATE 20 MG: 20 INJECTION, POWDER, LYOPHILIZED, FOR SOLUTION INTRAMUSCULAR at 02:44

## 2021-03-11 RX ADMIN — DOXEPIN HYDROCHLORIDE 10 MG: 10 CAPSULE ORAL at 19:56

## 2021-03-11 RX ADMIN — CIPROFLOXACIN HYDROCHLORIDE 250 MG: 250 TABLET, FILM COATED ORAL at 19:57

## 2021-03-11 RX ADMIN — CIPROFLOXACIN HYDROCHLORIDE 250 MG: 250 TABLET, FILM COATED ORAL at 11:38

## 2021-03-11 RX ADMIN — PANTOPRAZOLE SODIUM 40 MG: 40 TABLET, DELAYED RELEASE ORAL at 11:38

## 2021-03-11 RX ADMIN — BUSPIRONE HYDROCHLORIDE 5 MG: 5 TABLET ORAL at 11:37

## 2021-03-11 RX ADMIN — ASPIRIN 81 MG: 81 TABLET, FILM COATED ORAL at 11:37

## 2021-03-11 RX ADMIN — BUSPIRONE HYDROCHLORIDE 5 MG: 5 TABLET ORAL at 19:56

## 2021-03-11 RX ADMIN — WATER 1.2 ML: 1 INJECTION INTRAMUSCULAR; INTRAVENOUS; SUBCUTANEOUS at 02:44

## 2021-03-11 RX ADMIN — DIAZEPAM 10 MG: 5 INJECTION, SOLUTION INTRAMUSCULAR; INTRAVENOUS at 01:11

## 2021-03-11 ASSESSMENT — PAIN SCALES - PAIN ASSESSMENT IN ADVANCED DEMENTIA (PAINAD): CONSOLABILITY: 0

## 2021-03-11 NOTE — PROGRESS NOTES
Pt continues to be un-directable, confused,  and restless in her bed/room. Pt woke up and began to climb out of bed. She was assisted to the restroom. When finished, this nurse attempted to steady her on her feet and offer my hand to steady her. The pt then pushed my arm away and closed her eyes and walked straight into the wall, knocking her forehead. Pt was then escorted back to her bed. No swelling, or deformity noted. Hospitalist called and notified. No new orders were received.

## 2021-03-11 NOTE — PROGRESS NOTES
Even with PRN medication administrations, the patient is still confused, agitated, and constantly attempting to climb out of bed. She is unable to be redirected. Staff remain at bedside to ensure pt safety.

## 2021-03-11 NOTE — GROUP NOTE
Group Therapy Note    Date: 3/11/2021    Group Start Time: 0830  Group End Time: 0900    Number of Participants: 7/9    Type: Morning Goals Group/ Community Meeting    Group Topic/Objective: Set Goal For The Day and to review Unit Rules and Regulations. Notes:  Pt in bed.   Per discussion with nursing, pt allowed to continue to rest.     Discipline Responsible: Certified Therapeutic Recreation Specialist     Electronically signed by Aayush Tello [address] [place] [address], [place] on 3/11/2021 at 9:03 AM

## 2021-03-11 NOTE — BH NOTE
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    Expressive aphasia 6/9/2020        Patient Active Problem List   Diagnosis    AMS (altered mental status)    Severe malnutrition ([facility] [address]    Expressive aphasia    Anterior communicating artery aneurysm    Advanced dementia ([facility] [address]    Subcortical vascular dementia with behavioral disturbance ([facility] [address]    Other sequelae of other cerebrovascular disease       Review of Systems    OBJECTIVE  Vital Signs:  Vitals:    03/10/21 2015   BP: 136/79   Pulse: 85   Resp: 18   Temp: 98.3 °F (36.8 °C)   SpO2: 100%       Labs:  Recent Results (from the past 48 hour(s))   Hemoglobin A1c    Collection Time: 03/10/21  6:00 AM   Result Value Ref Range    Hemoglobin A1C 4.9 4.2 - 6.3 %    eAG 94 mg/dL   Vitamin D 25 hydroxy    Collection Time: 03/10/21  6:00 AM   Result Value Ref Range    Vit D, 25-Hydroxy 40.31 >20 NG/ML   Lipid, Fasting    Collection Time: 03/10/21  6:00 AM   Result Value Ref Range    Triglyceride, Fasting 69 <150 MG/DL    Cholesterol, Fasting 157 <200 MG/DL    HDL 65 >40 MG/DL    LDL Direct 88 <100 MG/DL   RPR     Collection Time: 03/10/21  6:00 AM   Result Value Ref Range    RPR NON REACTIVE NON REACTIVE       PSYCHIATRIC ASSESSMENT / MENTAL STATUS EXAM:   Vitals: Blood pressure 136/79, pulse 85, temperature 98.3 °F (36.8 °C), temperature source Temporal, resp.  rate 18, height 5' 3\" (1.6 m), weight 94 lb 2 oz (42.7 kg), SpO2 100 %. CONSTITUTIONAL:    Appearance: Appears older than stated age. Drowsy but oriented only to self. No acute distress. Adequate grooming and hygiene. Poor eye contact. No prominent physical abnormalities. Attitude: Manner is cooperative and pleasant but more fatigued than yesterday. Motor: No psychomotor agitation, but movement slower than yesterday. No abnormal movements noted  Speech: Clearly articulated but halting; slowed rate, decreased volume, tone & with paucity. Language: intact understanding and production  Mood: euthymic  Affect: euthymic, decreased range, non-labile, congruent with mood and content of speech  Thought Production: Spontaneous  Thought Form: Coherent, linear, logical. No tangentiality or circumstantiality. No flight of ideas or loosening of associations. Thought Content/Perceptions: No SANA, no AVH, no delusion  Insight: Impaired  Judgment: Impaired  Memory: Immediate, recent, and remote appear decreased, though not formally tested. Attention: maintained throughout interview  Fund of knowledge: Average  Gait/Balance: required assistance from staff to ambulate from bathroom to bedside    IMPRESSION:   Subcortical vascular dementia with behavioral disturbance  Other sequelae of other cerebrovascular disease    PLAN:  Psychiatric management:medication initiation and titration, group and individual therapy, safe and therapeutic environment. Status of problem/condition: Improving  Medical co-morbidities: Management per [facility] group, appreciate assistance  Legal Status:Pending  Disposition:estimated LOS: Pending. The treatment team reviewed with the patient the diagnosis and treatment recommendations to include the risks, benefits, and side effects of chosen medications. The patient verbalized understanding and agreed with the treatment regimen as outlined above. The patient was encouraged to participate in groups.   Medical records, Labs, Diagnotic tests reviewed  q15 min safety checks for safety  Interval History  Review current labs  Continue current medications  Supportive Therapy Provided  Pt had an opportunity to ask questions and address concerns  Pt encouraged to continue therapy group or individual.  Pt was in agreement with treatment plan. The risks benefits and side effects of medications were discussed with the patient, including alternatives and no treatment.     Electronically signed by KALI Ivan CNP on 3/11/2021 at 11:56 AM

## 2021-03-11 NOTE — GROUP NOTE
Group Therapy Note    Date: 3/11/2021    Group Start Time: 6003  Group End Time: 0398  Group Topic: Psychotherapy    [address] [facility]    PAM Sanchez        Group Therapy Note    Attendees: 2/7         Notes:  Patient was sleeping during group and had very little sleep overnight, so did not attend due to needed rest.  Discipline Responsible: /Counselor      Signature:  PAM Barron

## 2021-03-11 NOTE — PROGRESS NOTES
Pt has been not been redirectable this evening. Constantly roaming in her room, into the halls, and other places. The pt is easily agitated and extremely confused. Pt seems manic, talking non-stop, calling out numbers, family member names, etc.The PRN that was given at [address] was not effective. Dr. Lili Mohr was called at 12. More PRN orders were placed. Pt remained difficult to direct. Seble Dill RN remained near the bedside since the pt continued to climb out of bed even after the second PRN was administered. Pt is still restless and only closes eyes for short periods of time.

## 2021-03-11 NOTE — PROGRESS NOTES
The patient continues to be un-redirectable. The pt is extremely impulsive, attempting to climb out of bed, continuously talking non-stop with world salad speech. She appears to be talking to people that are not in the room. She is easily agitated when staff attempts to redirect her. Pt is unable to be safely left alone in her room. Staff members have been continuously monitoring the patient to ensure safety.

## 2021-03-11 NOTE — PLAN OF CARE
Problem: Falls - Risk of:  Goal: Will remain free from falls  Description: Will remain free from falls  3/11/2021 1213 by Sravani Randall RN  Outcome: Ongoing  3/11/2021 0116 by Josefina Koch RN  Outcome: Ongoing  Goal: Absence of physical injury  Description: Absence of physical injury  3/11/2021 1213 by Sravani Randall RN  Outcome: Ongoing  3/11/2021 0116 by Josefina Koch RN  Outcome: Ongoing     Problem: Health Behavior:  Goal: Identification of resources available to assist in meeting health care needs will improve  Description: Identification of resources available to assist in meeting health care needs will improve  3/11/2021 1213 by Sravani Randall RN  Outcome: Ongoing  3/11/2021 0116 by Josefina Koch RN  Outcome: Ongoing     Problem: Role Relationship:  Goal: Ability to participate appropriately in conversations will improve  Description: Ability to participate appropriately in conversations will improve  3/11/2021 1213 by Sravani Randall RN  Outcome: Ongoing  3/11/2021 0116 by Josefina Koch RN  Outcome: Ongoing     Problem: Safety:  Goal: Ability to remain free from injury will improve  Description: Ability to remain free from injury will improve  3/11/2021 1213 by Sravani Randall RN  Outcome: Ongoing  3/11/2021 0116 by Josefina Koch RN  Outcome: Ongoing     Problem: Self-Care:  Goal: Ability to participate in self-care as condition permits will improve  Description: Ability to participate in self-care as condition permits will improve  3/11/2021 1213 by Sravani Randall RN  Outcome: Ongoing  3/11/2021 0116 by Josefina Koch RN  Outcome: Ongoing

## 2021-03-11 NOTE — PROGRESS NOTES
Pt is restless in bed, refusing to lay down. Took her pants off demanding that there was something in them, itching her. However, she refused to let this nurse inspect them. She took them off turned them inside out, then turned them back right side out, and put them back on. She is refusing any redirection from staff members and continues to remain restless and confused despite PRN medications.

## 2021-03-11 NOTE — GROUP NOTE
Group Therapy Note    Date: 3/11/2021    Group Start Time: 0843  Group End Time: 1600  Group Topic: Psychoeducation    [address] [place], [place]        Group Therapy Note    Attendees: 7/7        Notes:  Pts participated in recreational therapy group; Musical Reminiscence, led by Music Therapist; Ck Lopez, and observed by this therapist.  Music Therapist led them in a variety of songs meant to engage discussion r/t their past and improve movement. Pt remained asleep throughout music therapy intervention. Pt did not respond to verbal attempts to wake pt up. Status After Intervention:  Unchanged    Participation Level: None    Participation Quality: Lethargic      Speech:  Unable to determine due to pt sleeping      Thought Process/Content: Unable to determine due to pt sleeping      Affective Functioning: Flat      Mood: Flat      Level of consciousness:  Drowsy      Response to Learning: Pt unable to demonstrate response to learning at this time.        Endings: None Reported    Modes of Intervention: Support, Socialization, Exploration, Activity and Movement      Discipline Responsible: Certified Therapeutic       Signature:  Jerrod Villatoro [place]

## 2021-03-12 PROCEDURE — 6370000000 HC RX 637 (ALT 250 FOR IP): Performed by: NURSE PRACTITIONER

## 2021-03-12 PROCEDURE — 1240000000 HC EMOTIONAL WELLNESS R&B

## 2021-03-12 RX ADMIN — BUSPIRONE HYDROCHLORIDE 5 MG: 5 TABLET ORAL at 20:33

## 2021-03-12 RX ADMIN — PANTOPRAZOLE SODIUM 40 MG: 40 TABLET, DELAYED RELEASE ORAL at 06:15

## 2021-03-12 RX ADMIN — DIVALPROEX SODIUM 250 MG: 250 TABLET, EXTENDED RELEASE ORAL at 09:03

## 2021-03-12 RX ADMIN — MEMANTINE HYDROCHLORIDE 5 MG: 5 TABLET ORAL at 09:03

## 2021-03-12 RX ADMIN — DIVALPROEX SODIUM 250 MG: 250 TABLET, EXTENDED RELEASE ORAL at 20:33

## 2021-03-12 RX ADMIN — CIPROFLOXACIN HYDROCHLORIDE 250 MG: 250 TABLET, FILM COATED ORAL at 06:15

## 2021-03-12 RX ADMIN — BUSPIRONE HYDROCHLORIDE 5 MG: 5 TABLET ORAL at 09:02

## 2021-03-12 RX ADMIN — EZETIMIBE 10 MG: 10 TABLET ORAL at 09:03

## 2021-03-12 RX ADMIN — CALCIUM CARBONATE-VITAMIN D TAB 500 MG-200 UNIT 1 TABLET: 500-200 TAB at 11:06

## 2021-03-12 RX ADMIN — DOXEPIN HYDROCHLORIDE 10 MG: 10 CAPSULE ORAL at 20:33

## 2021-03-12 RX ADMIN — ASPIRIN 81 MG: 81 TABLET, FILM COATED ORAL at 09:03

## 2021-03-12 RX ADMIN — CIPROFLOXACIN HYDROCHLORIDE 250 MG: 250 TABLET, FILM COATED ORAL at 15:35

## 2021-03-12 ASSESSMENT — PAIN SCALES - PAIN ASSESSMENT IN ADVANCED DEMENTIA (PAINAD)
CONSOLABILITY: 0
TOTALSCORE: 0
FACIALEXPRESSION: 0
BREATHING: 0
BODYLANGUAGE: 0

## 2021-03-12 ASSESSMENT — PAIN SCALES - GENERAL
PAINLEVEL_OUTOF10: 0
PAINLEVEL_OUTOF10: 0

## 2021-03-12 NOTE — PLAN OF CARE
[address] [place] [address]  Day 3 Interdisciplinary Treatment Plan NOTE    Review Date & Time: 03/12/21 0830    Admission Type:   Admission Type: Involuntary , but now voluntary    Reason for admission:  Reason for Admission: wandering - climbed out of window- danger to self    Patient Diagnosis: subcortical vascular dementia with behavioral disturbance    PATIENT STRENGTHS:  Patient Strengths Strengths: No significant Physical Illness  Patient Strengths and Limitations:Limitations: Unrealistic self-view  Addictive Behavior:   Medical Problems:  Past Medical History:   Diagnosis Date    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 Thought Content:Normal: No  Thought Content: Poverty of Content  Hallucinations: None  Delusions: No  Memory:Normal: No  Memory: Poor Recent, Poor Remote  Insight and Judgment: No  Insight and Judgment: Poor Judgment, Poor Insight  Present Suicidal Ideation: No  Present Homicidal Ideation: No    Daily Assessment Last Entry:   Daily Sleep (WDL): Exceptions to WDL  Patient Currently in Pain: Denies  Daily Nutrition (WDL): Within Defined Limits    Patient Monitoring:  Frequency of Checks: 4 times per hour, close    Psychiatric Symptoms:   Depression Symptoms  Depression Symptoms: Impaired concentration  Anxiety Symptoms  Anxiety Symptoms: Generalized  Renae Symptoms  Renae Symptoms: Poor judgment     Psychosis Symptoms  Delusion Type: No problems reported or observed.     Suicide Risk CSSR-S:  1) Within the past month, have you wished you were dead or wished you could go to sleep and not wake up? : No  2) Have you actually had any thoughts of killing yourself? : No  6) Have you ever done anything, started to do anything, or prepared to do anything to end your life?: No        EDUCATION:   Learner Progress Toward Treatment Goals: Reviewed results and recommendations of this team    Method: Individual    Outcome: No evidence of Learning    PATIENT GOALS: med titration, return to home    PLAN/TREATMENT RECOMMENDATIONS UPDATE: med titration, compliance with unit programming    Estimated Length of Stay Update:  7 days  Estimated Discharge Date Update: 3/16/21  Discharge criteria: med titration      SHORT-TERM GOALS UPDATE:   Time frame for Short-Term Goals: 7 days    LONG-TERM GOALS UPDATE:   Time frame for Long-Term Goals: 7 days  Members Present in Team Meeting: See Signature Sheet    PAM Kelley

## 2021-03-12 NOTE — PLAN OF CARE
Problem: Falls - Risk of:  Goal: Will remain free from falls  Description: Will remain free from falls  3/12/2021 1102 by Hina Jarrell RN  Outcome: Ongoing  3/11/2021 2221 by Maxim Youngblood RN  Outcome: Ongoing  Goal: Absence of physical injury  Description: Absence of physical injury  3/12/2021 1102 by Hina Jarrell RN  Outcome: Ongoing  3/11/2021 2221 by Maxim Youngblood RN  Outcome: Ongoing     Problem: Health Behavior:  Goal: Identification of resources available to assist in meeting health care needs will improve  Description: Identification of resources available to assist in meeting health care needs will improve  3/12/2021 1102 by Hina Jarrell RN  Outcome: Ongoing  3/11/2021 2221 by Maxim Youngblood RN  Outcome: Ongoing     Problem: Role Relationship:  Goal: Ability to participate appropriately in conversations will improve  Description: Ability to participate appropriately in conversations will improve  3/12/2021 1102 by Hina Jarrell RN  Outcome: Ongoing  3/11/2021 2221 by Maxim Youngblood RN  Outcome: Ongoing     Problem: Safety:  Goal: Ability to remain free from injury will improve  Description: Ability to remain free from injury will improve  3/12/2021 1102 by Hina Jarrell RN  Outcome: Ongoing  3/11/2021 2221 by Maxim Youngblood RN  Outcome: Ongoing     Problem: Self-Care:  Goal: Ability to participate in self-care as condition permits will improve  Description: Ability to participate in self-care as condition permits will improve  3/12/2021 1102 by Hina Jarrell RN  Outcome: Ongoing  3/11/2021 2221 by Maxim Youngblood RN  Outcome: Ongoing

## 2021-03-12 NOTE — GROUP NOTE
Group Therapy Note    Date: 3/12/2021    Group Start Time: 6867  Group End Time: 1600    Number of Participants: 7/9    Type: Exercise/Recreation Group    Group Topic/Objective: Chair Exercises    Notes:  Pt participated actively in the group. Pt noted to struggle in the beginning to follow demonstration and prompts, but as the activity progressed was able to do so. Status After Intervention:  Improved    Participation Level:  Active Listener and Interactive    Participation Quality: Appropriate, Attentive and Sharing    Speech:  normal    Thought Process/Content: Confused    Affective Functioning: Congruent    Mood: Bright    Level of consciousness:  Alert and Attentive    Response to Learning: Able to verbalize current knowledge/experience and Able to verbalize/acknowledge new learning    Endings: None Reported    Modes of Intervention: Activity and Movement    Discipline Responsible: Certified Therapeutic Recreation Specialist     Electronically signed by Zheng Rodriguez MA on 3/12/2021 at 4:11 PM

## 2021-03-12 NOTE — PROGRESS NOTES
Comprehensive Nutrition Assessment    Type and Reason for Visit:  Initial, Positive Nutrition Screen    Nutrition Recommendations/Plan: continue with diet as ordered and supplement modified    Nutrition Assessment:  Pt with low BMI, intakes reported less than 50% of meals, some intakes of supplements but will modify to add frozen supplement to try to improve intake. Unable to do hands on assessment, pt participating in group. Malnutrition Assessment:  Malnutrition Status:  Mild malnutrition    Context:  Chronic Illness     Findings of the 6 clinical characteristics of malnutrition:      Estimated Daily Nutrient Needs:  Energy (kcal):  8204-6137; Weight Used for Energy Requirements:  Current     Protein (g):  43-51;  Weight Used for Protein Requirements:  Current(1-1.2)        Fluid (ml/day):  7954-8051; Method Used for Fluid Requirements:  1 ml/kcal      Nutrition Related Findings:  Very thin female, visable fat loss      Wounds:  None       Current Nutrition Therapies:    DIET GENERAL;  Dietary Nutrition Supplements: Standard High Calorie Oral Supplement, Frozen Oral Supplement    Anthropometric Measures:  · Height: 5' 3\" (160 cm)  · Current Body Weight: 94 lb (42.6 kg)   · Admission Body Weight: 94 lb (42.6 kg)    · Usual Body Weight: 100 lb (45.4 kg)(6/8/20 prior encounter)     · Ideal Body Weight: 115 lbs; % Ideal Body Weight 81.7 %   · BMI: 16.7  · Adjusted Body Weight:  ; No Adjustment   · BMI Categories: Underweight (BMI less than 22) age over 72       Nutrition Diagnosis:   · Inadequate oral intake, In context of chronic illness, Underweight related to inadequate protein-energy intake as evidenced by intake 0-25%, intake 26-50%, severe loss of subcutaneous fat      Nutrition Interventions:   Food and/or Nutrient Delivery:  Continue Current Diet, Modify Oral Nutrition Supplement  Nutrition Education/Counseling:  Education not indicated   Coordination of Nutrition Care:  Continue to monitor while inpatient, Coordination of Community Care    Goals:  Oral intakes of meals and supplements will improve to at least 50% during her stay       Nutrition Monitoring and Evaluation:   Behavioral-Environmental Outcomes:  None Identified   Food/Nutrient Intake Outcomes:  Food and Nutrient Intake, Supplement Intake  Physical Signs/Symptoms Outcomes:  Biochemical Data, Chewing or Swallowing, Weight, Meal Time Behavior     Discharge Planning:    Continue Oral Nutrition Supplement, Continue current diet     Electronically signed by Agnes Car RD, LD on 3/12/21 at 2:59 PM EST    Contact: [phone]

## 2021-03-12 NOTE — PLAN OF CARE
Problem: Falls - Risk of:  Goal: Will remain free from falls  Description: Will remain free from falls  3/11/2021 2221 by Hugh Dailey RN  Outcome: Ongoing  3/11/2021 1213 by Luiza Gill RN  Outcome: Ongoing  Goal: Absence of physical injury  Description: Absence of physical injury  3/11/2021 2221 by Hugh Dailey RN  Outcome: Ongoing  3/11/2021 1213 by Luiza Gill RN  Outcome: Ongoing     Problem: Health Behavior:  Goal: Identification of resources available to assist in meeting health care needs will improve  Description: Identification of resources available to assist in meeting health care needs will improve  3/11/2021 2221 by Hugh Dailey RN  Outcome: Ongoing  3/11/2021 1213 by Luiza Gill RN  Outcome: Ongoing     Problem: Role Relationship:  Goal: Ability to participate appropriately in conversations will improve  Description: Ability to participate appropriately in conversations will improve  3/11/2021 2221 by Hugh Dailey RN  Outcome: Ongoing  3/11/2021 1213 by Luiza Gill RN  Outcome: Ongoing     Problem: Safety:  Goal: Ability to remain free from injury will improve  Description: Ability to remain free from injury will improve  3/11/2021 2221 by Hugh Dailey RN  Outcome: Ongoing  3/11/2021 1213 by Luiza Gill RN  Outcome: Ongoing     Problem: Self-Care:  Goal: Ability to participate in self-care as condition permits will improve  Description: Ability to participate in self-care as condition permits will improve  3/11/2021 2221 by Hugh Dailey RN  Outcome: Ongoing  3/11/2021 1213 by Luiza Gill RN  Outcome: Ongoing

## 2021-03-12 NOTE — BH NOTE
Psychiatric Progress Note    Dayana Young  [id number]  03/12/21    CHIEF COMPLAINT: \"I don't know why I'm here. \"    HPI: Dayana Young is a 68 y. o. female that presents with complaint of mental health issues related to a history dementia. She lives with family who reports that she broke out of the house and was found wandering the streets and after being brought in by police, they pink slipped her. Patient on arrival is alert she follows commands, vital signs are stable. And she is pleasant but she but confused and demented. She has no complaints but she has tangential thoughts and is making nonsensical statements. Denies other questions or concerns.  Family trying to seek placement reportedly.     Patient was seen in consultation on 3/8/2021 and decision was made to [facility] the [facility] once medically stable. Family was in agreement with plan.     Today, 3/12/2021, the patient was sitting at the table awake to person. She stated she never pays attention to the month, and that the year was \"[birth year]90 or 1941. \" Interestingly, patient was born in [birth year]. She did not remember why she was at the hospital and can't remember the city. She denied suicidal or homicidal ideation, depression or anxiety, visual or auditory hallucinations. She stated that she had a full appetite and that she feels safe here but that she wants to go home. She noted that her sleep was \"good. \"     Allergies   Allergen Reactions    Ativan [Lorazepam] Other (See Comments)     Psychotic Reaction/Combative    Vicodin [Hydrocodone-Acetaminophen] Other (See Comments)     Psychotic reaction    Valium [Diazepam] Other (See Comments)     Ineffective       Medications Prior to Admission: aspirin 81 MG chewable tablet, Take 1 tablet by mouth daily  Calcium Carbonate-Vitamin D 500-125 MG-UNIT TABS, Take 1 tablet by mouth daily  ezetimibe (ZETIA) 10 MG tablet, Take 10 mg by mouth daily  busPIRone (BUSPAR) 5 MG tablet, Take 5 mg by mouth 2 times daily   omeprazole (PRILOSEC) 40 MG delayed release capsule, Take 40 mg by mouth daily  ciprofloxacin (CIPRO) 500 MG tablet, Take 1 tablet by mouth 2 times daily for 4 days    Past Medical History:   Diagnosis Date    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 [address]    Subcortical vascular dementia with behavioral disturbance ([facility] [address]    Other sequelae of other cerebrovascular disease       Review of Systems    OBJECTIVE  Vital Signs:  Vitals:    03/12/21 0930   BP: 118/67   Pulse: 79   Resp: 18   Temp: 97.3 °F (36.3 °C)   SpO2: 99%       Labs:  No results found for this or any previous visit (from the past 48 hour(s)). PSYCHIATRIC ASSESSMENT / MENTAL STATUS EXAM:   Vitals: Blood pressure 118/67, pulse 79, temperature 97.3 °F (36.3 °C), temperature source Temporal, resp. rate 18, height 5' 3\" (1.6 m), weight 94 lb 2 oz (42.7 kg), SpO2 99 %. CONSTITUTIONAL:    Appearance: Appears older than stated age. Drowsy but oriented only to self. No acute distress. Adequate grooming and hygiene. Poor eye contact. No prominent physical abnormalities. Attitude: Manner is cooperative and pleasant but more energetic than yesterday. Motor: No psychomotor agitation. No abnormal movements noted  Speech: Clearly articulated, normal rate, normal volume, tone and satish. Language: intact understanding and production  Mood: euthymic  Affect: euthymic, decreased range, non-labile, congruent with mood and content of speech  Thought Production: Spontaneous  Thought Form: Coherent, linear, logical. No tangentiality or circumstantiality. No flight of ideas or loosening of associations.     Thought Content/Perceptions: No SANA, no AVH, no delusion  Insight:

## 2021-03-12 NOTE — GROUP NOTE
Group Therapy Note    Date: 3/12/2021    Group Start Time: 1030  Group End Time: 1100  Group Topic: Psychoeducation    [address]        Group Therapy Note    Attendees:           Notes:  Pts participated in recreational therapy group; Music and Movement, led by Music Therapist; Elaine Lemus, and observed by this therapist.  Music Therapist led them in a variety of songs meant to encourage instrument play, participation in group, movement. Pt was lethargic and drowsy at beginning of the music intervention. In response to an increase in musical stimuli, pt was able to be aroused. Pt engaged in instrument play only when given direct verbal and visual prompts by the Music Therapist. In response to Pt preferred song \"[place],\" Pt shared that her mother is , and that she \"usually just sits there and misses her. \" Music Therapist provided condolences and the pt asked the Music Therapist to continue singing when prompted, selecting a [employer] when given a choice. Pt thanked the Music Therapist for the music. Status After Intervention:  Improved    Participation Level:  Active Listener and Interactive    Participation Quality: Appropriate, Attentive and Sharing      Speech:  Quiet      Thought Process/Content: Logical      Affective Functioning: Flat      Mood: Flat      Level of consciousness:  Alert      Response to Learning: Able to verbalize current knowledge/experience      Endings: None Reported    Modes of Intervention: Support, Socialization, Exploration, Activity and Movement      Discipline Responsible: Certified Therapeutic        Signature:  Zheng Wall MA

## 2021-03-12 NOTE — PROGRESS NOTES
Patient oriented to self only. Up most of the day. Participated in groups and meals. Denies depression, + anxiety 4/10. Denies AVH. Ambulating with steady gait. Compliant with medication. Calm and pleasant.

## 2021-03-12 NOTE — GROUP NOTE
Group Therapy Note    Date: 3/12/2021    Group Start Time: 0830  Group End Time: 0930     Number of Participants: 6/7    Type: Morning Goals Group/ Community Meeting    Group Topic/Objective: Set Goal For The Day and to review Unit Rules and Regulations. Patient's Goal:  Pt states her goal is \"Go home. \"    Notes:  Pt presented with blunted affect and moderate eye contact. Pt states she is feeling \"not too good\", but unable to state why. Pt expressed feeling grateful for \"family. \"  Pt reports restful sleep, but unsure how long she slept. Depression (0-10): 0    Anxiety (0-10): Pt unable to state if she is feeling anxious or not. Irritability/Aggitation (0-10): 0    Status After Intervention:  Improved    Participation Level:  Active Listener and Interactive    Participation Quality: Appropriate, Attentive and Sharing    Speech:  normal    Thought Process/Content: Confused    Affective Functioning: Blunted    Mood: Blunted    Level of consciousness:  Alert and Attentive    Response to Learning: Able to verbalize current knowledge/experience    Endings: None Reported    Modes of Intervention: Education, Support and Socialization    Discipline Responsible: Certified Therapeutic Recreation Specialist     Electronically signed by Jeff Cohen MA on 3/12/2021 at 9:57 AM

## 2021-03-12 NOTE — GROUP NOTE
Group Therapy Note    Date: 3/12/2021    Group Start Time: 8770  Group End Time: 3422  Group Topic: Psychotherapy    [address]    PAM Garcia        Group Therapy Note    Attendees: 7/9         Notes:  Patient attended group discussion on gratitude and reminiscence. Status After Intervention:  Unchanged    Participation Level:  Active Listener and Interactive    Participation Quality: Inappropriate      Speech:  normal      Thought Process/Content: Flight of ideas      Affective Functioning: Congruent      Mood: euthymic      Level of consciousness:  Alert and Preoccupied      Response to Learning: Capable of insight      Endings: None Reported    Modes of Intervention: Education, Support, Socialization and Exploration      Discipline Responsible: /Counselor      Signature:  PAM Beverly

## 2021-03-13 PROCEDURE — 6370000000 HC RX 637 (ALT 250 FOR IP): Performed by: PSYCHIATRY & NEUROLOGY

## 2021-03-13 PROCEDURE — 6370000000 HC RX 637 (ALT 250 FOR IP): Performed by: NURSE PRACTITIONER

## 2021-03-13 PROCEDURE — 1240000000 HC EMOTIONAL WELLNESS R&B

## 2021-03-13 PROCEDURE — 99232 SBSQ HOSP IP/OBS MODERATE 35: CPT | Performed by: PSYCHIATRY & NEUROLOGY

## 2021-03-13 RX ORDER — DIVALPROEX SODIUM 125 MG/1
250 CAPSULE, COATED PELLETS ORAL 2 TIMES DAILY
Status: DISCONTINUED | OUTPATIENT
Start: 2021-03-13 | End: 2021-03-14

## 2021-03-13 RX ADMIN — EZETIMIBE 10 MG: 10 TABLET ORAL at 08:59

## 2021-03-13 RX ADMIN — IBUPROFEN 600 MG: 600 TABLET ORAL at 14:27

## 2021-03-13 RX ADMIN — ASPIRIN 81 MG: 81 TABLET, FILM COATED ORAL at 08:59

## 2021-03-13 RX ADMIN — CALCIUM CARBONATE-VITAMIN D TAB 500 MG-200 UNIT 1 TABLET: 500-200 TAB at 08:59

## 2021-03-13 RX ADMIN — DIVALPROEX SODIUM 250 MG: 125 CAPSULE, COATED PELLETS ORAL at 14:52

## 2021-03-13 RX ADMIN — IBUPROFEN 600 MG: 600 TABLET ORAL at 20:23

## 2021-03-13 RX ADMIN — BUSPIRONE HYDROCHLORIDE 5 MG: 5 TABLET ORAL at 20:17

## 2021-03-13 RX ADMIN — PANTOPRAZOLE SODIUM 40 MG: 40 TABLET, DELAYED RELEASE ORAL at 09:02

## 2021-03-13 RX ADMIN — BUSPIRONE HYDROCHLORIDE 5 MG: 5 TABLET ORAL at 08:59

## 2021-03-13 RX ADMIN — CIPROFLOXACIN HYDROCHLORIDE 250 MG: 250 TABLET, FILM COATED ORAL at 08:59

## 2021-03-13 RX ADMIN — DOXEPIN HYDROCHLORIDE 10 MG: 10 CAPSULE ORAL at 20:17

## 2021-03-13 RX ADMIN — MEMANTINE HYDROCHLORIDE 5 MG: 5 TABLET ORAL at 08:59

## 2021-03-13 RX ADMIN — DIVALPROEX SODIUM 250 MG: 125 CAPSULE, COATED PELLETS ORAL at 20:18

## 2021-03-13 ASSESSMENT — PAIN SCALES - PAIN ASSESSMENT IN ADVANCED DEMENTIA (PAINAD)
BREATHING: 0
BODYLANGUAGE: 0
BREATHING: 0
FACIALEXPRESSION: 1
NEGVOCALIZATION: 1
NEGVOCALIZATION: 0

## 2021-03-13 ASSESSMENT — PAIN SCALES - GENERAL
PAINLEVEL_OUTOF10: 9
PAINLEVEL_OUTOF10: 8
PAINLEVEL_OUTOF10: 9

## 2021-03-13 NOTE — PROGRESS NOTES
fingers. Coordination:  Rapid alternating movements and fine finger movements are intact. There is no dysmetria on finger-to-nose and heel-knee-shin. There are no abnormal or extraneous movements. Romberg is absent. Gait/Stance:  Posture is normal. Gait is steady with normal steps, base, arm swing, and turning. Heel and toe walking are normal.      PSYCHIATRIC EXAMINATION / MENTAL STATUS EXAM    CONSTITUTIONAL:    Vitals:   Vitals:    03/13/21 0830   BP: 104/64   Pulse: 71   Resp: 16   Temp: 97.6 °F (36.4 °C)   SpO2:       General appearance: [x] appears age, []  appears older than stated age,               [x]  adequately dressed and groomed, [] disheveled,               []  in no acute distress, [x] appears mildly distressed, [] other           MUSCULOSKELETAL:   Gait:   [x] normal, [] antalgic, [] unsteady, [] gait not evaluated   Station:             [] erect, [] sitting, [] recumbent, [] other        Strength/tone:  [x] muscle strength and tone appear consistent with age and condition     [] atrophy      [] abnormal movements  PSYCHIATRIC:    Appearance: Appears stated age. Alert and oriented to person, place, time & situation. No acute distress. Adequate grooming and hygiene. Good eye contact. No prominent physical abnormalities. Attitude: Manner is cooperative and pleasant  Motor: No psychomotor agitation, retardation or abnormal movements noted  Speech: Clearly articulated; normal rate, volume, tone & amount. Language: understanding impaired, but production normal  Mood: depressed, anxious  Affect: anxious, decreased range, non-labile, congruent with mood and content of speech  Thought Production: Spontaneous. Thought Form: Coherent, non-linear, illogical & and not goal-directed. Tangentiality present, no circumstantiality. No flight of ideas or loosening of associations.   Thought Content/Perceptions: No SANA, no AVH, delusion that she has lost something  Insight: impaired  Judgment: impaired  Memory: Immediate, recent, and remote do not appear intact, though not formally tested. Attention: maintained fairly well throughout interview  Fund of knowledge: Average  Gait/Balance: WNL/WNL    Impression:   Subcortical vascular dementia with behavioral disturbance  Other sequelae of other cerebrovascular disease    Problem List:   Subcortical vascular dementia with behavioral disturbance ([facility] [address]    Plan:  1. Admit to [place] [address] [facility]  2. Consult Hospitalist to evaluate and treat medical conditions  3. Adjust psychotropic medications to target symptoms  4. Occupational Therapy, Physical Therapy, Group Psychotherapy as tolerated  5. Reviewed treatment plan with patient including medication risks, benefits, side effects. Obtained informed consent for treatment  6. Anticipated length of stay 10-12 days  7. I certify that inpatient psychiatric hospital admission is medically necessary for treatment, which can be expected to improve the patient's condition, and/or for diagnostic study  8. Psychiatric management:medication initiation and titration, group and individual therapy, safe and therapeutic environment  9. Status of problem/condition: Improving  10. Medical co-morbidities: Management per [facility] group, appreciate assistance  11. Legal Status: Pending  12. The treatment team reviewed with the patient the diagnosis and treatment recommendations to include the risks, benefits, and side effects of chosen medications  13. The patient verbalized understanding and agreed with the treatment regimen as outlined above  14. The patient was encouraged to participate in groups  15. Medical records, Labs, Diagnotic tests reviewed  16. q15 min safety checks for safety  17. Interval History  18. Review current labs  19. Continue current medications  20. Supportive Therapy Provided  21. Pt had an opportunity to ask questions and address concerns  22.  Pt encouraged to continue therapy group or individual  23. Pt was in agreement with treatment plan  24. The risks benefits and side effects of medications were discussed with the patient, including alternatives and no treatment.     Electronically signed by Thomas Carreno DO on 3/13/2021 at 2:08 PM

## 2021-03-13 NOTE — PROGRESS NOTES
Patient active on unit all day. Compliant with medications. Appetite decreased. Encouraged intake and fluids. Patient found sitting on the floor in the hallway outside her room. She said she was there because someone was going into her room and she didn't want to go in there. 2 patients were noted to be roaming in her room. Pt denies falling or injuries. Attempted to help patient get up but she said she could do it and was able to get off the floor. Dr Vicky Cooper notified. No new orders. Advised pt she could not sit on the floor. She was receptive and agreed to only sit on furniture. Pt polite and no aggressive behaviors noted today.

## 2021-03-13 NOTE — PLAN OF CARE
Problem: Falls - Risk of:  Goal: Will remain free from falls  Description: Will remain free from falls  3/13/2021 0043 by Delbert Hirsch RN  Outcome: Ongoing  3/12/2021 1102 by Matt Pandya RN  Outcome: Ongoing  Goal: Absence of physical injury  Description: Absence of physical injury  3/13/2021 0043 by Delbert Hirsch RN  Outcome: Ongoing  3/12/2021 1102 by Matt Pandya RN  Outcome: Ongoing     Problem: Health Behavior:  Goal: Identification of resources available to assist in meeting health care needs will improve  Description: Identification of resources available to assist in meeting health care needs will improve  3/13/2021 0043 by Delbert Hirsch RN  Outcome: Ongoing  3/12/2021 1102 by Matt Pandya RN  Outcome: Ongoing     Problem: Role Relationship:  Goal: Ability to participate appropriately in conversations will improve  Description: Ability to participate appropriately in conversations will improve  3/13/2021 0043 by Delbert Hirsch RN  Outcome: Ongoing  3/12/2021 1102 by Matt Pandya RN  Outcome: Ongoing     Problem: Safety:  Goal: Ability to remain free from injury will improve  Description: Ability to remain free from injury will improve  3/13/2021 0043 by Delbert Hirsch RN  Outcome: Ongoing  3/12/2021 1102 by Matt Pandya RN  Outcome: Ongoing     Problem: Self-Care:  Goal: Ability to participate in self-care as condition permits will improve  Description: Ability to participate in self-care as condition permits will improve  3/13/2021 0043 by Delbert Hirsch RN  Outcome: Ongoing  3/12/2021 1102 by Matt Pandya RN  Outcome: Ongoing

## 2021-03-13 NOTE — PLAN OF CARE
Problem: Falls - Risk of:  Goal: Will remain free from falls  Description: Will remain free from falls  3/13/2021 1303 by Jenn Godfrey RN  Outcome: Ongoing  3/13/2021 0043 by Kostas Chavez RN  Outcome: Ongoing  Goal: Absence of physical injury  Description: Absence of physical injury  3/13/2021 1303 by Jenn Godfrey RN  Outcome: Ongoing  3/13/2021 0043 by Kostas Chavez RN  Outcome: Ongoing     Problem: Health Behavior:  Goal: Identification of resources available to assist in meeting health care needs will improve  Description: Identification of resources available to assist in meeting health care needs will improve  3/13/2021 1303 by Jenn Godfrey RN  Outcome: Ongoing  3/13/2021 0043 by Kostas Chavez RN  Outcome: Ongoing     Problem: Role Relationship:  Goal: Ability to participate appropriately in conversations will improve  Description: Ability to participate appropriately in conversations will improve  3/13/2021 1303 by Jenn Godfrey RN  Outcome: Ongoing  3/13/2021 0043 by Kostas Chavez RN  Outcome: Ongoing     Problem: Safety:  Goal: Ability to remain free from injury will improve  Description: Ability to remain free from injury will improve  3/13/2021 1303 by Jenn Godfrey RN  Outcome: Ongoing  3/13/2021 0043 by Kostas Chavez RN  Outcome: Ongoing     Problem: Self-Care:  Goal: Ability to participate in self-care as condition permits will improve  Description: Ability to participate in self-care as condition permits will improve  3/13/2021 1303 by Jenn Godfrey RN  Outcome: Ongoing  3/13/2021 0043 by Kostas Chavez RN  Outcome: Ongoing

## 2021-03-13 NOTE — BH NOTE
Erika Canchola was visible on the milieu. Somewhat social with female peer. Cooperative with vitals and medication compliant. She ate several snacks. She denied depression, SI/HI, anxiety and hallucinations. She frequently forgets how to get to her room and is shown many times through the night. She is able to let staff know when she needs to use the bathroom. She went in the bathroom and the door was slightly open. She insisted that the door be closed completely. When she was finished she did not know how to get out of the bathroom. When she opened the door she did not come out, but closed the door again and stated that she could not get out. She was focused on finding something but she was unable to say what she was looking for. She tried to describe it but could not do that either. It seemed to cause her anxiety. She was unable to sleep partially because of this and she thought her roommate was a man and was upset that she had to share a room with a man. This writer explained to her that her roommate was a woman several times. It was late before she fell asleep.

## 2021-03-14 PROCEDURE — 1240000000 HC EMOTIONAL WELLNESS R&B

## 2021-03-14 PROCEDURE — 99233 SBSQ HOSP IP/OBS HIGH 50: CPT | Performed by: NURSE PRACTITIONER

## 2021-03-14 PROCEDURE — 6370000000 HC RX 637 (ALT 250 FOR IP): Performed by: PSYCHIATRY & NEUROLOGY

## 2021-03-14 PROCEDURE — 6370000000 HC RX 637 (ALT 250 FOR IP): Performed by: NURSE PRACTITIONER

## 2021-03-14 RX ORDER — DIVALPROEX SODIUM 125 MG/1
250 CAPSULE, COATED PELLETS ORAL ONCE
Status: DISCONTINUED | OUTPATIENT
Start: 2021-03-14 | End: 2021-03-17 | Stop reason: HOSPADM

## 2021-03-14 RX ORDER — DIVALPROEX SODIUM 125 MG/1
500 CAPSULE, COATED PELLETS ORAL NIGHTLY
Status: DISCONTINUED | OUTPATIENT
Start: 2021-03-15 | End: 2021-03-17 | Stop reason: HOSPADM

## 2021-03-14 RX ORDER — DIVALPROEX SODIUM 125 MG/1
250 CAPSULE, COATED PELLETS ORAL DAILY
Status: DISCONTINUED | OUTPATIENT
Start: 2021-03-15 | End: 2021-03-17 | Stop reason: HOSPADM

## 2021-03-14 RX ADMIN — DOXEPIN HYDROCHLORIDE 10 MG: 10 CAPSULE ORAL at 21:00

## 2021-03-14 RX ADMIN — BUSPIRONE HYDROCHLORIDE 5 MG: 5 TABLET ORAL at 20:59

## 2021-03-14 RX ADMIN — BUSPIRONE HYDROCHLORIDE 5 MG: 5 TABLET ORAL at 08:44

## 2021-03-14 RX ADMIN — EZETIMIBE 10 MG: 10 TABLET ORAL at 08:44

## 2021-03-14 RX ADMIN — PANTOPRAZOLE SODIUM 40 MG: 40 TABLET, DELAYED RELEASE ORAL at 08:44

## 2021-03-14 RX ADMIN — DIVALPROEX SODIUM 250 MG: 125 CAPSULE, COATED PELLETS ORAL at 08:44

## 2021-03-14 RX ADMIN — MEMANTINE HYDROCHLORIDE 5 MG: 5 TABLET ORAL at 08:44

## 2021-03-14 RX ADMIN — IBUPROFEN 600 MG: 600 TABLET ORAL at 16:40

## 2021-03-14 RX ADMIN — CALCIUM CARBONATE-VITAMIN D TAB 500 MG-200 UNIT 1 TABLET: 500-200 TAB at 08:44

## 2021-03-14 RX ADMIN — ASPIRIN 81 MG: 81 TABLET, FILM COATED ORAL at 08:45

## 2021-03-14 RX ADMIN — DIVALPROEX SODIUM 250 MG: 125 CAPSULE, COATED PELLETS ORAL at 20:59

## 2021-03-14 ASSESSMENT — PAIN SCALES - PAIN ASSESSMENT IN ADVANCED DEMENTIA (PAINAD)
BODYLANGUAGE: 0
CONSOLABILITY: 0

## 2021-03-14 NOTE — BH NOTE
Roberta Soni was visible on the milieu. Cooperative with vitals and medication compliant. Able to make her need for toileting known. No incontinence. She continues to need frequent direction to find her room. Becomes very anxious and almost manic when she begins to look for something that is not here. She is unable to say what she is looking for or describe it. Denied all psych symptoms.

## 2021-03-14 NOTE — PROGRESS NOTES
Initial Psychiatric History and Physical    Enedina España  [id number]  3/9/2021  03/14/21    ID: Patient is a 68 yrs y.o. female    CC: \"I don't know why I'm here. \"    HPI: Gal Canseco a 68 y. o. female that presents with complaint of mental health issue.  Patient apparently has a history dementia. She lives with family they state she broke out of the house and was found wandering the streets was brought in by police, pink slipped by them. Patient on arrival is alert she follows commands vital signs are stable she is pleasant but she is confused, demented. She has no complaints but she has tangential thoughts and is making nonsensical statements.  Denies other questions or concerns.  Family trying to seek placement reportedly. Patient was seen in consultation on 3/8/2021 and decision was made to [facility] the [facility] once medically stable. Family was in agreement with plan. During today's interview, the patient was alert but only oriented to self. She denies SI/HI and AV hallucinations. She denies depression and anxiety. States her sleep and appetite are \"pretty good. \"  Her speech is non-linear and illogical, however she was pleasant and cooperative during the interview. Bedside RN notes patient slept for 5 hours last night, is very antsy and redirectable only briefly. She is medication compliant. Past Psychiatric History:   None known    Family Psychiatric History:   None known    Family Psychiatric History:   History reviewed. No pertinent family history. Allergies:   Allergies   Allergen Reactions    Ativan [Lorazepam] Other (See Comments)     Psychotic Reaction/Combative    Vicodin [Hydrocodone-Acetaminophen] Other (See Comments)     Psychotic reaction    Valium [Diazepam] Other (See Comments)     Ineffective        OBJECTIVE  Vital Signs:  Vitals:    03/14/21 2000   BP:    Pulse: 86   Resp:    Temp:    SpO2:        Labs:  No results found for this or any previous visit (from the past 48 hour(s)). Review of Systems:  Reports of no current cardiovascular, respiratory, gastrointestinal, genitourinary, integumentary, neurological, muscuoskeletal, or immunological symptoms today. PSYCHIATRIC: See HPI above. Gait/Stance:  Not assessed      PSYCHIATRIC EXAMINATION / MENTAL STATUS EXAM    CONSTITUTIONAL:    Vitals:   Vitals:    03/14/21 2000   BP:    Pulse: 86   Resp:    Temp:    SpO2:       General appearance: [x] appears age, []  appears older than stated age,               [x]  adequately dressed and groomed, [] disheveled,               []  in no acute distress, [x] appears mildly distressed, [] other           MUSCULOSKELETAL:   Gait:   [] normal, [] antalgic, [] unsteady, [x] gait not evaluated   Station:             [] erect, [x] sitting, [] recumbent, [] other        Strength/tone:  [x] muscle strength and tone appear consistent with age and condition     [] atrophy      [] abnormal movements  PSYCHIATRIC:    Appearance: Appears stated age. Alert and oriented to person, place, time & situation. No acute distress. Adequate grooming and hygiene. Good eye contact. No prominent physical abnormalities. Attitude: Manner is cooperative and pleasant  Motor: No psychomotor agitation, retardation or abnormal movements noted  Speech: Clearly articulated; normal rate, volume, tone & amount. Language: understanding impaired, but production normal  Mood: depressed, anxious  Affect: anxious, decreased range, non-labile, congruent with mood and content of speech  Thought Production: Spontaneous. Thought Form: Coherent, non-linear, illogical & and not goal-directed. Tangentiality present, no circumstantiality. No flight of ideas or loosening of associations.   Thought Content/Perceptions: No SANA, no AVH, delusion that she has lost something  Insight: impaired  Judgment: impaired  Memory: Immediate, recent, and remote do not appear intact, though not formally

## 2021-03-14 NOTE — PLAN OF CARE
Problem: Falls - Risk of:  Goal: Will remain free from falls  Description: Will remain free from falls  3/13/2021 2311 by Aleyda Wyatt RN  Outcome: Ongoing  3/13/2021 1303 by Edna Phalen, RN  Outcome: Ongoing  Goal: Absence of physical injury  Description: Absence of physical injury  3/13/2021 2311 by Aleyda Wyatt RN  Outcome: Ongoing  3/13/2021 1303 by Edna Phalen, RN  Outcome: Ongoing     Problem: Health Behavior:  Goal: Identification of resources available to assist in meeting health care needs will improve  Description: Identification of resources available to assist in meeting health care needs will improve  3/13/2021 2311 by Aleyda Wyatt RN  Outcome: Ongoing  3/13/2021 1303 by Edna Phalen, RN  Outcome: Ongoing     Problem: Role Relationship:  Goal: Ability to participate appropriately in conversations will improve  Description: Ability to participate appropriately in conversations will improve  3/13/2021 2311 by Aleyda Wyatt RN  Outcome: Ongoing  3/13/2021 1303 by Edna Phalen, RN  Outcome: Ongoing     Problem: Safety:  Goal: Ability to remain free from injury will improve  Description: Ability to remain free from injury will improve  3/13/2021 2311 by Aleyda Wyatt RN  Outcome: Ongoing  3/13/2021 1303 by Edna Phalen, RN  Outcome: Ongoing     Problem: Self-Care:  Goal: Ability to participate in self-care as condition permits will improve  Description: Ability to participate in self-care as condition permits will improve  3/13/2021 2311 by Aleyda Wyatt RN  Outcome: Ongoing  3/13/2021 1303 by Edna Phalen, RN  Outcome: Ongoing

## 2021-03-15 LAB
ALBUMIN SERPL-MCNC: 3.7 GM/DL (ref 3.4–5)
ALP BLD-CCNC: 79 IU/L (ref 40–129)
ALT SERPL-CCNC: 24 U/L (ref 10–40)
ANION GAP SERPL CALCULATED.3IONS-SCNC: 5 MMOL/L (ref 4–16)
AST SERPL-CCNC: 29 IU/L (ref 15–37)
BASOPHILS ABSOLUTE: 0 K/CU MM
BASOPHILS RELATIVE PERCENT: 0.8 % (ref 0–1)
BILIRUB SERPL-MCNC: 0.2 MG/DL (ref 0–1)
BUN BLDV-MCNC: 22 MG/DL (ref 6–23)
CALCIUM SERPL-MCNC: 9.3 MG/DL (ref 8.3–10.6)
CHLORIDE BLD-SCNC: 103 MMOL/L (ref 99–110)
CO2: 32 MMOL/L (ref 21–32)
CREAT SERPL-MCNC: 0.9 MG/DL (ref 0.6–1.1)
DIFFERENTIAL TYPE: ABNORMAL
EOSINOPHILS ABSOLUTE: 0.2 K/CU MM
EOSINOPHILS RELATIVE PERCENT: 4.9 % (ref 0–3)
GFR AFRICAN AMERICAN: >60 ML/MIN/1.73M2
GFR NON-AFRICAN AMERICAN: >60 ML/MIN/1.73M2
GLUCOSE BLD-MCNC: 79 MG/DL (ref 70–99)
HCT VFR BLD CALC: 41.6 % (ref 37–47)
HEMOGLOBIN: 13.2 GM/DL (ref 12.5–16)
IMMATURE NEUTROPHIL %: 0.3 % (ref 0–0.43)
LYMPHOCYTES ABSOLUTE: 1.8 K/CU MM
LYMPHOCYTES RELATIVE PERCENT: 45.4 % (ref 24–44)
MCH RBC QN AUTO: 28 PG (ref 27–31)
MCHC RBC AUTO-ENTMCNC: 31.7 % (ref 32–36)
MCV RBC AUTO: 88.1 FL (ref 78–100)
MONOCYTES ABSOLUTE: 0.4 K/CU MM
MONOCYTES RELATIVE PERCENT: 11.1 % (ref 0–4)
PDW BLD-RTO: 14 % (ref 11.7–14.9)
PLATELET # BLD: 181 K/CU MM (ref 140–440)
PMV BLD AUTO: 9.6 FL (ref 7.5–11.1)
POTASSIUM SERPL-SCNC: 4.1 MMOL/L (ref 3.5–5.1)
RBC # BLD: 4.72 M/CU MM (ref 4.2–5.4)
SEGMENTED NEUTROPHILS ABSOLUTE COUNT: 1.5 K/CU MM
SEGMENTED NEUTROPHILS RELATIVE PERCENT: 37.5 % (ref 36–66)
SODIUM BLD-SCNC: 140 MMOL/L (ref 135–145)
TOTAL IMMATURE NEUTOROPHIL: 0.01 K/CU MM
TOTAL PROTEIN: 6.5 GM/DL (ref 6.4–8.2)
WBC # BLD: 3.9 K/CU MM (ref 4–10.5)

## 2021-03-15 PROCEDURE — 1240000000 HC EMOTIONAL WELLNESS R&B

## 2021-03-15 PROCEDURE — 85025 COMPLETE CBC W/AUTO DIFF WBC: CPT

## 2021-03-15 PROCEDURE — 6370000000 HC RX 637 (ALT 250 FOR IP): Performed by: NURSE PRACTITIONER

## 2021-03-15 PROCEDURE — 80053 COMPREHEN METABOLIC PANEL: CPT

## 2021-03-15 PROCEDURE — 99233 SBSQ HOSP IP/OBS HIGH 50: CPT | Performed by: NURSE PRACTITIONER

## 2021-03-15 PROCEDURE — 36415 COLL VENOUS BLD VENIPUNCTURE: CPT

## 2021-03-15 RX ORDER — BUSPIRONE HYDROCHLORIDE 5 MG/1
5 TABLET ORAL 3 TIMES DAILY
Status: DISCONTINUED | OUTPATIENT
Start: 2021-03-15 | End: 2021-03-17 | Stop reason: HOSPADM

## 2021-03-15 RX ADMIN — MEMANTINE HYDROCHLORIDE 5 MG: 5 TABLET ORAL at 08:05

## 2021-03-15 RX ADMIN — PANTOPRAZOLE SODIUM 40 MG: 40 TABLET, DELAYED RELEASE ORAL at 08:06

## 2021-03-15 RX ADMIN — BUSPIRONE HYDROCHLORIDE 5 MG: 5 TABLET ORAL at 21:34

## 2021-03-15 RX ADMIN — DIVALPROEX SODIUM 250 MG: 125 CAPSULE ORAL at 08:05

## 2021-03-15 RX ADMIN — DIVALPROEX SODIUM 500 MG: 125 CAPSULE ORAL at 21:34

## 2021-03-15 RX ADMIN — BUSPIRONE HYDROCHLORIDE 5 MG: 5 TABLET ORAL at 08:05

## 2021-03-15 RX ADMIN — ASPIRIN 81 MG: 81 TABLET, FILM COATED ORAL at 08:05

## 2021-03-15 RX ADMIN — CALCIUM CARBONATE-VITAMIN D TAB 500 MG-200 UNIT 1 TABLET: 500-200 TAB at 08:05

## 2021-03-15 RX ADMIN — BUSPIRONE HYDROCHLORIDE 5 MG: 5 TABLET ORAL at 15:07

## 2021-03-15 RX ADMIN — IBUPROFEN 600 MG: 600 TABLET ORAL at 21:34

## 2021-03-15 RX ADMIN — DOXEPIN HYDROCHLORIDE 10 MG: 10 CAPSULE ORAL at 21:34

## 2021-03-15 RX ADMIN — EZETIMIBE 10 MG: 10 TABLET ORAL at 08:05

## 2021-03-15 ASSESSMENT — PAIN SCALES - PAIN ASSESSMENT IN ADVANCED DEMENTIA (PAINAD)
CONSOLABILITY: 0
FACIALEXPRESSION: 0
BREATHING: 0
BODYLANGUAGE: 0

## 2021-03-15 ASSESSMENT — PAIN SCALES - GENERAL
PAINLEVEL_OUTOF10: 7
PAINLEVEL_OUTOF10: 0

## 2021-03-15 NOTE — GROUP NOTE
Group Therapy Note    Date: 3/15/2021    Group Start Time: 0830  Group End Time: 8058    Number of Participants: 8/10    Type: Morning Goals Group/ Community Meeting    Group Topic/Objective: Set Goal For The Day and to review Unit Rules and Regulations. Patient's Goal:  Pt states her goal is \"Go home. \"    Notes:  Pt presented with blunted affect and struggled to focus during group. Pt states she is feeling \"sick to my stomach. \" and unable to state anything she is grateful for at this time. Pt reports restful sleep of ~7 hours. Depression (0-10): 0    Anxiety (0-10): 0    Irritability/Aggitation (0-10): 0    Status After Intervention:  Improved    Participation Level: Interactive    Participation Quality: Sharing and Pt struggled to focus during group.      Speech:  normal    Thought Process/Content: Confused    Affective Functioning: Blunted    Mood: Blunted    Level of consciousness:  Preoccupied    Response to Learning: Able to verbalize current knowledge/experience    Endings: None Reported    Modes of Intervention: Education, Support and Socialization    Discipline Responsible: Certified Therapeutic Recreation Specialist     Electronically signed by Zoe Dye MA on 3/15/2021 at 9:26 AM

## 2021-03-15 NOTE — GROUP NOTE
Group Therapy Note    Date: 3/15/2021    Group Start Time: 0302  Group End Time: 1600    Number of Participants: 6/8    Type: Exercise/Recreation Group    Group Topic/Objective: Chair Exercises    Notes:  Pt participated actively in group, but noted to require Mod prompting. Pt struggled to follow therapist's pace and would often start going too fast and making herself \"feel dizzy. \"  Pt would respond to prompting to slow down. In the last ~10 minutes of group, pt noted to struggle to follow 1-step directions and demonstration and then started doing random movements. Status After Intervention:  Improved    Participation Level:  Active Listener and Interactive    Participation Quality: Appropriate and Attentive    Speech:  normal    Thought Process/Content: Confused    Affective Functioning: Congruent    Mood: Bright    Level of consciousness:  Alert    Response to Learning: Able to verbalize current knowledge/experience and Able to verbalize/acknowledge new learning    Endings: None Reported    Modes of Intervention: Activity and Movement    Discipline Responsible: Certified Therapeutic Recreation Specialist     Electronically signed by Aayush Burgess [address] [place] [address] MA on 3/15/2021 at 4:07 PM

## 2021-03-15 NOTE — GROUP NOTE
Group Therapy Note    Date: 3/15/2021    Group Start Time: 3636  Group End Time: 9582  Group Topic: Psychoeducation    [address] [place], [place]        Group Therapy Note    Attendees: 8/10       Notes:  Pts participated in recreational therapy group; Theme Song Quiz. Pts were given various tv show theme songs from the 62s. As they listened; they had to guess the show the song came from. Purpose was to encourage reminiscence discussion. Pt attended group, but noted to struggle to focus. Pt at one point attempt to climb onto the table and showed no insight into inappropriateness. Whenever therapist attempt to engage pt in conversation, pt would state \"I don't know I haven't slept in days. \"    Status After Intervention:  Unchanged    Participation Level: Minimal    Participation Quality: Pt struggled to focus during group.        Speech:  normal      Thought Process/Content: Confused      Affective Functioning: Congruent      Mood: irritable      Level of consciousness:  Preoccupied      Response to Learning: Resistant      Endings: None Reported    Modes of Intervention: Support, Socialization, Exploration and Activity      Discipline Responsible: Certified Therapeutic Recreation Specialist       Signature:  Perry Hunt

## 2021-03-15 NOTE — PROGRESS NOTES
Psychiatric Progress Note    Janette Sutherland  [id number]  3/9/2021  03/15/21    ID: Patient is a 68 yrs y.o. female    CC: \"I don't know why I'm here. \"    HPI: Gaurav Romero a 68 y. o. female that presents with complaint of mental health issue.  Patient apparently has a history dementia. She lives with family they state she broke out of the house and was found wandering the streets was brought in by police, pink slipped by them. Patient on arrival is alert she follows commands vital signs are stable she is pleasant but she is confused, demented. She has no complaints but she has tangential thoughts and is making nonsensical statements.  Denies other questions or concerns.  Family trying to seek placement reportedly. Patient was seen in consultation on 3/8/2021 and decision was made to [facility] the [facility] to [facility] once medically stable. Family was in agreement with plan. During today's interview, Mayra Suarez was found sitting in the common area at the table alert to self but not month or year. When asked who the President of the [place] was she claimed to know but just couldn't get the name out. When I told her it was Cortez Snell she responded with \"yes, yes his wife was the one that . \"  She was in considerably better spirits today than previous interviews. She denied suicidal or homicidal ideation, depression or anxiety, visual or auditory hallucinations. She was smiling and pleasant to talk to and stated her appetite as \"good\" joking that she was getting fat. She maintained the confused state exhibited in previous interviews: however, her answers were more energetic and more coherently in line with the topic being questioned. There wer still tangential thoughts but less than previous. When asked about sleep she stated that she was awake all night, but during the nursing report prior to the interview it was stated that she had slept 8 hours.      Past Psychiatric History:   None known    Family Psychiatric History:   None known    Family Psychiatric History:   History reviewed. No pertinent family history. Allergies:   Allergies   Allergen Reactions    Ativan [Lorazepam] Other (See Comments)     Psychotic Reaction/Combative    Vicodin [Hydrocodone-Acetaminophen] Other (See Comments)     Psychotic reaction    Valium [Diazepam] Other (See Comments)     Ineffective        OBJECTIVE  Vital Signs:  Vitals:    03/15/21 0901   BP: (!) 154/71   Pulse: 75   Resp: 16   Temp: 97.6 °F (36.4 °C)   SpO2: 99%       Labs:  Recent Results (from the past 48 hour(s))   Comprehensive Metabolic Panel w/ Reflex to MG    Collection Time: 03/15/21  6:00 AM   Result Value Ref Range    Sodium 140 135 - 145 MMOL/L    Potassium 4.1 3.5 - 5.1 MMOL/L    Chloride 103 99 - 110 mMol/L    CO2 32 21 - 32 MMOL/L    BUN 22 6 - 23 MG/DL    CREATININE 0.9 0.6 - 1.1 MG/DL    Glucose 79 70 - 99 MG/DL    Calcium 9.3 8.3 - 10.6 MG/DL    Albumin 3.7 3.4 - 5.0 GM/DL    Total Protein 6.5 6.4 - 8.2 GM/DL    Total Bilirubin 0.2 0.0 - 1.0 MG/DL    ALT 24 10 - 40 U/L    AST 29 15 - 37 IU/L    Alkaline Phosphatase 79 40 - 129 IU/L    GFR Non-African American >60 >60 mL/min/1.73m2    GFR African American >60 >60 mL/min/1.73m2    Anion Gap 5 4 - 16   CBC auto differential    Collection Time: 03/15/21  6:00 AM   Result Value Ref Range    WBC 3.9 (L) 4.0 - 10.5 K/CU MM    RBC 4.72 4.2 - 5.4 M/CU MM    Hemoglobin 13.2 12.5 - 16.0 GM/DL    Hematocrit 41.6 37 - 47 %    MCV 88.1 78 - 100 FL    MCH 28.0 27 - 31 PG    MCHC 31.7 (L) 32.0 - 36.0 %    RDW 14.0 11.7 - 14.9 %    Platelets 592 460 - 659 K/CU MM    MPV 9.6 7.5 - 11.1 FL    Differential Type AUTOMATED DIFFERENTIAL     Segs Relative 37.5 36 - 66 %    Lymphocytes % 45.4 (H) 24 - 44 %    Monocytes % 11.1 (H) 0 - 4 %    Eosinophils % 4.9 (H) 0 - 3 %    Basophils % 0.8 0 - 1 %    Segs Absolute 1.5 K/CU MM    Lymphocytes Absolute 1.8 K/CU MM    Monocytes Absolute 0.4 K/CU MM Eosinophils Absolute 0.2 K/CU MM    Basophils Absolute 0.0 K/CU MM    Immature Neutrophil % 0.3 0 - 0.43 %    Total Immature Neutrophil 0.01 K/CU MM       Review of Systems:  Reports of no current cardiovascular, respiratory, gastrointestinal, genitourinary, integumentary, neurological, muscuoskeletal, or immunological symptoms today. PSYCHIATRIC: See HPI above. Gait/Stance:  Not assessed      PSYCHIATRIC EXAMINATION / MENTAL STATUS EXAM    CONSTITUTIONAL:    Vitals:   Vitals:    03/15/21 0901   BP: (!) 154/71   Pulse: 75   Resp: 16   Temp: 97.6 °F (36.4 °C)   SpO2: 99%      General appearance: [x] appears age, []  appears older than stated age,               [x]  adequately dressed and groomed, [] disheveled,               []  in no acute distress, [x] appears mildly distressed, [] other           MUSCULOSKELETAL:   Gait:   [] normal, [] antalgic, [] unsteady, [x] gait not evaluated   Station:             [] erect, [x] sitting, [] recumbent, [] other        Strength/tone:  [x] muscle strength and tone appear consistent with age and condition     [] atrophy      [] abnormal movements  PSYCHIATRIC:    Appearance: Appears stated age. Alert and oriented to person, place, time & situation. No acute distress. Adequate grooming and hygiene. Good eye contact. No prominent physical abnormalities. Attitude: Manner is cooperative and pleasant  Motor: No psychomotor agitation, retardation or abnormal movements noted  Speech: Clearly articulated; normal rate, volume, tone & amount. Language: understanding impaired, but production normal  Mood: depressed, anxious  Affect: anxious, decreased range, non-labile, congruent with mood and content of speech  Thought Production: Spontaneous. Thought Form: Coherent, non-linear, illogical & and not goal-directed. Tangentiality present, no circumstantiality. No flight of ideas or loosening of associations.   Thought Content/Perceptions: No SANA, no AVH, delusion that she has

## 2021-03-15 NOTE — PROGRESS NOTES
Pt alert to self only during the shift. Pt pleasantly confused, ambulating on the unit w/o difficulty. Pt denies SI, HI or AVH along with anxiety and depression. Pt compliant with meds and assessment. Pt has been social with other pts on the unit, \"taking care\" of her room mate. Pt continent but often forgets where the bathroom is.

## 2021-03-15 NOTE — PLAN OF CARE
Problem: Falls - Risk of:  Goal: Will remain free from falls  Description: Will remain free from falls  3/14/2021 2045 by Leonela Tran RN  Outcome: Ongoing  3/14/2021 1114 by Claudine Rodriguez RN  Outcome: Ongoing  Goal: Absence of physical injury  Description: Absence of physical injury  3/14/2021 2045 by Leonela Tran RN  Outcome: Ongoing  3/14/2021 1114 by Claudine Rodriguez RN  Outcome: Ongoing     Problem: Health Behavior:  Goal: Identification of resources available to assist in meeting health care needs will improve  Description: Identification of resources available to assist in meeting health care needs will improve  3/14/2021 2045 by Leonela Tran RN  Outcome: Ongoing  3/14/2021 1114 by Claudine Rodriguez RN  Outcome: Ongoing     Problem: Role Relationship:  Goal: Ability to participate appropriately in conversations will improve  Description: Ability to participate appropriately in conversations will improve  3/14/2021 2045 by Leonela Tran RN  Outcome: Ongoing  3/14/2021 1114 by Claudine Rodriguez RN  Outcome: Ongoing     Problem: Safety:  Goal: Ability to remain free from injury will improve  Description: Ability to remain free from injury will improve  3/14/2021 2045 by Leonela Tran RN  Outcome: Ongoing  3/14/2021 1114 by Claudine Rodriguez RN  Outcome: Ongoing     Problem: Self-Care:  Goal: Ability to participate in self-care as condition permits will improve  Description: Ability to participate in self-care as condition permits will improve  3/14/2021 2045 by Leonela Tran RN  Outcome: Ongoing  3/14/2021 1114 by Claudine Rodriguez RN  Outcome: Ongoing

## 2021-03-15 NOTE — BH NOTE
Phan Whiteside was visible on the milieu. She was social with female peer. She was confused and it was difficult to direct her. She frequently is looking for something, cannot say what it is, what it is for or what it looks like. More often than not [facility] and [employer] seem to be the answer. She was medication compliant. She tends to be impulsive and intrusive. She was assisted to bed and has slept well.

## 2021-03-16 LAB
BACTERIA: ABNORMAL /HPF
BILIRUBIN URINE: NEGATIVE MG/DL
BLOOD, URINE: NEGATIVE
CAST TYPE: ABNORMAL /HPF
CLARITY: CLEAR
COLOR: YELLOW
CRYSTAL TYPE: ABNORMAL /HPF
EPITHELIAL CELLS, UA: ABNORMAL /HPF
GLUCOSE, URINE: NEGATIVE MG/DL
KETONES, URINE: NEGATIVE MG/DL
LEUKOCYTE ESTERASE, URINE: ABNORMAL
MUCUS: ABNORMAL HPF
NITRITE URINE, QUANTITATIVE: NEGATIVE
PH, URINE: 5.5 (ref 5–8)
PROTEIN UA: NEGATIVE MG/DL
RBC URINE: ABNORMAL /HPF (ref 0–6)
SPECIFIC GRAVITY UA: 1.01 (ref 1–1.03)
UROBILINOGEN, URINE: 0.2 MG/DL (ref 0.2–1)
VOLUME, (UVOL): 12 ML (ref 10–12)
WBC UA: ABNORMAL /HPF (ref 0–5)

## 2021-03-16 PROCEDURE — 6370000000 HC RX 637 (ALT 250 FOR IP): Performed by: NURSE PRACTITIONER

## 2021-03-16 PROCEDURE — 99233 SBSQ HOSP IP/OBS HIGH 50: CPT | Performed by: NURSE PRACTITIONER

## 2021-03-16 PROCEDURE — 81001 URINALYSIS AUTO W/SCOPE: CPT

## 2021-03-16 PROCEDURE — 1240000000 HC EMOTIONAL WELLNESS R&B

## 2021-03-16 RX ADMIN — EZETIMIBE 10 MG: 10 TABLET ORAL at 08:25

## 2021-03-16 RX ADMIN — PANTOPRAZOLE SODIUM 40 MG: 40 TABLET, DELAYED RELEASE ORAL at 06:42

## 2021-03-16 RX ADMIN — BUSPIRONE HYDROCHLORIDE 5 MG: 5 TABLET ORAL at 20:12

## 2021-03-16 RX ADMIN — BUSPIRONE HYDROCHLORIDE 5 MG: 5 TABLET ORAL at 14:42

## 2021-03-16 RX ADMIN — ASPIRIN 81 MG: 81 TABLET, FILM COATED ORAL at 08:25

## 2021-03-16 RX ADMIN — DOXEPIN HYDROCHLORIDE 10 MG: 10 CAPSULE ORAL at 20:11

## 2021-03-16 RX ADMIN — DIVALPROEX SODIUM 500 MG: 125 CAPSULE ORAL at 20:12

## 2021-03-16 RX ADMIN — CALCIUM CARBONATE-VITAMIN D TAB 500 MG-200 UNIT 1 TABLET: 500-200 TAB at 08:25

## 2021-03-16 RX ADMIN — BUSPIRONE HYDROCHLORIDE 5 MG: 5 TABLET ORAL at 08:25

## 2021-03-16 RX ADMIN — DIVALPROEX SODIUM 250 MG: 125 CAPSULE ORAL at 08:25

## 2021-03-16 RX ADMIN — IBUPROFEN 600 MG: 600 TABLET ORAL at 11:33

## 2021-03-16 RX ADMIN — MEMANTINE HYDROCHLORIDE 5 MG: 5 TABLET ORAL at 08:25

## 2021-03-16 ASSESSMENT — PAIN SCALES - GENERAL
PAINLEVEL_OUTOF10: 1
PAINLEVEL_OUTOF10: 10

## 2021-03-16 NOTE — GROUP NOTE
Group Therapy Note    Date: 3/16/2021    Group Start Time: 1530  Group End Time: 1600     Number of Participants: 4/8    Type: Exercise/Recreation Group    Group Topic/Objective: Velcro Ball Toss and Chair Exercises    Notes:  Pt participated actively in the group. Pt noted to require Mod prompting during the velcro ball toss d/t pt struggled to focus. Status After Intervention:  Improved    Participation Level:  Active Listener and Interactive    Participation Quality: Appropriate, Attentive and Sharing    Speech:  normal    Thought Process/Content: Confused    Affective Functioning: Congruent    Mood: Bright    Level of consciousness:  Preoccupied    Response to Learning: Able to verbalize current knowledge/experience and Able to verbalize/acknowledge new learning    Endings: None Reported    Modes of Intervention: Activity and Movement    Discipline Responsible: Certified Therapeutic Recreation Specialist     Electronically signed by Zheng Uribe MA on 3/16/2021 at 4:02 PM

## 2021-03-16 NOTE — PLAN OF CARE
Problem: Falls - Risk of:  Goal: Will remain free from falls  Description: Will remain free from falls  3/15/2021 2343 by James Joseph RN  Outcome: Ongoing  3/15/2021 1032 by Aubree Louis RN  Outcome: Ongoing  Goal: Absence of physical injury  Description: Absence of physical injury  3/15/2021 2343 by James Joseph RN  Outcome: Ongoing  3/15/2021 1032 by Aubree Louis RN  Outcome: Ongoing     Problem: Health Behavior:  Goal: Identification of resources available to assist in meeting health care needs will improve  Description: Identification of resources available to assist in meeting health care needs will improve  3/15/2021 2343 by James Joseph RN  Outcome: Ongoing  3/15/2021 1032 by Aubree Louis RN  Outcome: Ongoing     Problem: Role Relationship:  Goal: Ability to participate appropriately in conversations will improve  Description: Ability to participate appropriately in conversations will improve  3/15/2021 2343 by James Joseph RN  Outcome: Ongoing  3/15/2021 1032 by Aubree Louis RN  Outcome: Ongoing     Problem: Safety:  Goal: Ability to remain free from injury will improve  Description: Ability to remain free from injury will improve  3/15/2021 2343 by James Joseph RN  Outcome: Ongoing  3/15/2021 1032 by Aubree Louis RN  Outcome: Ongoing     Problem: Self-Care:  Goal: Ability to participate in self-care as condition permits will improve  Description: Ability to participate in self-care as condition permits will improve  3/15/2021 2343 by James Joseph RN  Outcome: Ongoing  3/15/2021 1032 by Aubree Louis RN  Outcome: Ongoing     Problem: Pain:  Goal: Pain level will decrease  Description: Pain level will decrease  Outcome: Ongoing  Goal: Control of acute pain  Description: Control of acute pain  Outcome: Ongoing  Goal: Control of chronic pain  Description: Control of chronic pain  Outcome: Ongoing

## 2021-03-16 NOTE — PROGRESS NOTES
1:1 visit with patient. Patient alert with confusion but talked about her family. She also participated in reminiscing about her childhood. Patient also seemed to enjoy talking about her great grandkids. Patient voiced appreciation at end of 1:1 visit.

## 2021-03-16 NOTE — GROUP NOTE
Group Therapy Note    Date: 3/16/2021    Group Start Time: 0830  Group End Time: 0900    Number of Participants: 5/8    Type: Morning Goals Group/ Community Meeting    Group Topic/Objective: Set Goal For The Day and to review Unit Rules and Regulations. Patient's Goal:  Pt states her goal is \"Love all our family. \"    Notes:  Pt presented with confused thinking during group. Pt expressing belief that she is at home and that other pts are her family members. Pt states she is feeling \"ok\" and is grateful for \"family. \"  Pt reports restful sleep, but unsure how long she slept.       Depression (0-10): 0    Anxiety (0-10): 0    Irritability/Aggitation (0-10): 0    Status After Intervention:  Improved    Participation Level: Interactive    Participation Quality: Attentive and Sharing    Speech:  normal    Thought Process/Content: Confused    Affective Functioning: Blunted    Mood: Blunted    Level of consciousness:  Alert and Attentive    Response to Learning: Able to verbalize current knowledge/experience    Endings: None Reported    Modes of Intervention: Education, Support and Socialization    Discipline Responsible: Certified Therapeutic Recreation Specialist     Electronically signed by Waqas Montgomery MA on 3/16/2021 at 9:16 AM

## 2021-03-16 NOTE — PROGRESS NOTES
Pt seen by therapist from approximately [id number] for 1:1 session. Pt sorted card for therapist.  Pt able to sort into black and red without difficulty and required no assistance. When asked to sort into suit pt required Mod assistance. Pt noted to express belief that she was in a school and unable to state what month it is. Whenever pt was noted to struggle to provide orientation question or complete sorting; pt would state \"I have headache. \"    Electronically signed by Fan Neumann MA on 3/16/2021 at 1:46 PM

## 2021-03-16 NOTE — BH NOTE
Pt pleasantly confused, ambulating on the unit w/o difficulty. Pt denies SI/HI/AVH, anxiety or depression at this time. Pt alert only to self. Calm and cooperative during her assessment and was med compliant. Pt is continent but has to be directed as to where the bathroom is. UA with C&S collected/ordered at approximately 0045, hospitalist notified of urinary frequency. Pt is resting comfortably.  Will continue to monitor pt  while on the [facility]

## 2021-03-16 NOTE — PROGRESS NOTES
Nutrition Assessment     Type and Reason for Visit: Reassess    Nutrition Recommendations/Plan: continue with diet and supplements as ordered- pts intakes are meeting her needs at this time. Will change to follow upon consult    Nutrition Assessment:  Pt with improved oral intakes % of most meals and supplements  Nutrition related labs within acceptable range  Malnutrition Assessment:  Malnutrition Status: Mild malnutrition    Estimated Daily Nutrient Needs:  Energy (kcal): 7964-5019; Weight Used for Energy Requirements:  Current     Protein (g): 43-51;  Weight Used for Protein Requirements:  Current(1-1.2)        Fluid (ml/day): 9939-6231; Weight Used for Fluid Requirements:  1 ml/kcal      Nutrition Related Findings: Very thin female, visable fat loss      Current Nutrition Therapies:    DIET GENERAL;  Dietary Nutrition Supplements: Standard High Calorie Oral Supplement, Frozen Oral Supplement    Anthropometric Measures:  · Height: 5' 3\" (160 cm)  · Current Body Wt: 94 lb (42.6 kg)(no new weight)   · BMI: 16.7    Nutrition Diagnosis:   · Inadequate oral intake, In context of chronic illness, Underweight related to inadequate protein-energy intake as evidenced by intake 0-25%, intake 26-50%, severe loss of subcutaneous fat      Nutrition Interventions:   Food and/or Nutrient Delivery:  Continue Current Diet, Continue Oral Nutrition Supplement  Nutrition Education/Counseling:  No recommendation at this time   Coordination of Nutrition Care:  No recommendation at this time    Goals:  Oral intakes of meals and supplements will improve to at least 50% during her stay       Nutrition Monitoring and Evaluation:   Behavioral-Environmental Outcomes:  None Identified   Food/Nutrient Intake Outcomes:  None Identified  Physical Signs/Symptoms Outcomes:  Biochemical Data, Weight     Discharge Planning:    Continue Oral Nutrition Supplement, Continue current diet     Electronically signed by Kevin Pak, RD, LD on 3/16/21 at 6:49 PM EDT    Contact: [phone]

## 2021-03-16 NOTE — PROGRESS NOTES
Psychiatric Progress Note    Dayana Young  [id number]  3/9/2021  03/16/21    ID: Patient is a 68 yrs y.o. female    CC: \"I don't know why I'm here. \"    HPI: Lisa Cantrell a 68 y. o. female that presents with complaint of mental health issue.  Patient apparently has a history dementia. She lives with family they state she broke out of the house and was found wandering the streets was brought in by police, pink slipped by them. Patient on arrival is alert she follows commands vital signs are stable she is pleasant but she is confused, demented. She has no complaints but she has tangential thoughts and is making nonsensical statements.  Denies other questions or concerns.  Family trying to seek placement reportedly. Patient was seen in consultation on 3/8/2021 and decision was made to transfer the [facility] to [facility] once medically stable. Family was in agreement with plan. During today's interview, 3/16/2021, Alexis Cooper was found sitting in a chair in the common area watching TV alert and oriented to self only. She could not recall the month, year, or [employer] of [place] but she was in a good mood. She was willing and able to answer all questions. She denied suicidal or homicidal thoughts, anxiety or depression, auditory or visual hallucinations. When asked how she came to be at the hospital she responded, \"I don't think that's what I'm doing here, I don't know why I'm here. \"  She stated she wanted to go home but when asked who she lives with she struggled to remember but did say Concepc[place] Kerr. She said her home was \"[facility]\" and that it was in [place]. \" She stated that her appetite was good and that she slept good. Patient did state that she had a HA and would like medication for it. When the nurse brought her some Motrin she denied having a HA anymore.     Past Psychiatric History:   None known    Family Psychiatric History:   None known    Family Psychiatric Neutrophil % 0.3 0 - 0.43 %    Total Immature Neutrophil 0.01 K/CU MM   Urinalysis with microscopic    Collection Time: 03/16/21  1:00 AM   Result Value Ref Range    Color, UA YELLOW YELLOW    Clarity, UA CLEAR CLEAR    Glucose, Urine NEGATIVE NEGATIVE MG/DL    Bilirubin Urine NEGATIVE NEGATIVE MG/DL    Ketones, Urine NEGATIVE NEGATIVE MG/DL    Specific Gravity, UA 1.015 1.001 - 1.035    Blood, Urine NEGATIVE NEGATIVE    pH, Urine 5.5 5.0 - 8.0    Protein, UA NEGATIVE NEGATIVE MG/DL    Urobilinogen, Urine 0.2 0.2 - 1.0 MG/DL    Nitrite Urine, Quantitative NEGATIVE NEGATIVE    Leukocyte Esterase, Urine MODERATE (A) NEGATIVE    Volume, (UVOL) 12 10 - 12 ML    RBC, UA 0 TO 3 0 - 6 /HPF    WBC, UA 10 TO 15 0 - 5 /HPF    Epithelial Cells, UA 0 TO 3 /HPF    Cast Type NO CAST FORMS SEEN NO CAST FORMS SEEN /HPF    Bacteria, UA MODERATE (A) NEGATIVE /HPF    Crystal Type NONE SEEN NEGATIVE /HPF    Mucus, UA 1+ (A) NEGATIVE HPF       Review of Systems:  Reports of no current cardiovascular, respiratory, gastrointestinal, genitourinary, integumentary, neurological, muscuoskeletal, or immunological symptoms today. PSYCHIATRIC: See HPI above. Gait/Stance:  Not assessed      PSYCHIATRIC EXAMINATION / MENTAL STATUS EXAM    CONSTITUTIONAL:    Vitals:   Vitals:    03/16/21 0830   BP: (!) 105/51   Pulse: 68   Resp: 16   Temp: 98.5 °F (36.9 °C)   SpO2: 96%      General appearance: [x] appears age, []  appears older than stated age,               [x]  adequately dressed and groomed, [] disheveled,               []  in no acute distress, [x] appears mildly distressed, [] other           MUSCULOSKELETAL:   Gait:   [] normal, [] antalgic, [] unsteady, [x] gait not evaluated   Station:             [] erect, [x] sitting, [] recumbent, [] other        Strength/tone:  [x] muscle strength and tone appear consistent with age and condition     [] atrophy      [] abnormal movements  PSYCHIATRIC:    Appearance: Appears stated age.  Alert and oriented to person only. No acute distress. Adequate grooming and hygiene. Good eye contact. No prominent physical abnormalities. Attitude: Manner is cooperative and pleasant  Motor: No psychomotor agitation, retardation or abnormal movements noted  Speech: Clearly articulated; normal rate, volume, tone & amount. Language: understanding impaired, but production normal  Mood: euthymic but anxious  Affect: anxious, decreased range, non-labile, congruent with mood and content of speech  Thought Production: Spontaneous. Thought Form: Coherent, non-linear, illogical & and not goal-directed. Tangentiality present, no circumstantiality. No flight of ideas or loosening of associations. Thought Content/Perceptions: No SANA, no AVH, delusion that she has lost something  Insight: impaired  Judgment: impaired  Memory: Immediate, recent, and remote do not appear intact, though not formally tested. Attention: maintained fairly well throughout interview  Fund of knowledge: Average  Gait/Balance: not assessed    Impression:   Subcortical vascular dementia with behavioral disturbance  Other sequelae of other cerebrovascular disease    Problem List:   Subcortical vascular dementia with behavioral disturbance ([facility] [address]    Plan:  PLAN:  Psychiatric management:medication initiation and titration, group and individual therapy, safe and theraputic environment. Status of problem/condition: ?Improving  Medical co-morbidities: [facility], appreciate assistance  Legal Status:Pending  Disposition:estimated LOS: Pending. The treatment team reviewed with the patient the diagnosis and treatment recommendations to include the risks, benefits, and side effects of chosen medications. The patient verbalized understanding and agreed with the treatment regimen as outlined above. The patient was encouraged to participate in groups.   Medical records, Labs, Diagnotic tests reviewed  q15 min safety checks for safety  Interval

## 2021-03-16 NOTE — PLAN OF CARE
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   Thought Content:Normal: No  Thought Content: Preoccupations  Hallucinations: None  Delusions: No  Memory:Normal: No  Memory: Poor Recent, Poor Remote  Insight and Judgment: No  Insight and Judgment: Poor Judgment, Poor Insight  Present Suicidal Ideation: No  Present Homicidal Ideation: No    Daily Assessment Last Entry:   Daily Sleep (WDL): Exceptions to WDL  Patient Currently in Pain: No  Daily Nutrition (WDL): Exceptions to WDL  Appetite Change: Decreased  Barriers to Nutrition: Cognitive impairment, Elderly patient  Level of Assistance: Set up    Patient Monitoring:  Frequency of Checks: 4 times per hour, close    Psychiatric Symptoms:   Depression Symptoms  Depression Symptoms: Impaired concentration  Anxiety Symptoms  Anxiety Symptoms: Generalized, Obsessions  Renae Symptoms  Renae Symptoms: No problems reported or observed. Psychosis Symptoms  Delusion Type: No problems reported or observed.     Suicide Risk CSSR-S:  1) Within the past month, have you wished you were dead or wished you could go to sleep and not wake up? : No  2) Have you actually had any thoughts of killing yourself? : No  6) Have you ever done anything, started to do anything, or prepared to do anything to end your life?: No          EDUCATION:   Learner Progress Toward Treatment Goals: Reviewed results and recommendations of this team    Method: Individual    Outcome: No evidence of Learning    PATIENT GOALS: med adjustment    PLAN/TREATMENT RECOMMENDATIONS UPDATE: med adjsutment    Estimated Length of Stay Update:  7 days   Estimated Discharge Date Update: 3/17/21  Discharge Criteria:med adjustment      SHORT-TERM GOALS UPDATE:  Time frame for Short-Term Goals: 7 days     LONG-TERM GOALS UPDATE:  Time frame for Long-Term Goals: 7 days   Members Present in Team Meeting: See Signature Sheet    PAM Lazaro

## 2021-03-17 VITALS
OXYGEN SATURATION: 100 % | HEIGHT: 63 IN | DIASTOLIC BLOOD PRESSURE: 82 MMHG | RESPIRATION RATE: 18 BRPM | BODY MASS INDEX: 16.68 KG/M2 | WEIGHT: 94.13 LBS | TEMPERATURE: 97.3 F | SYSTOLIC BLOOD PRESSURE: 139 MMHG | HEART RATE: 87 BPM

## 2021-03-17 LAB — SARS-COV-2, NAAT: NOT DETECTED

## 2021-03-17 PROCEDURE — 6370000000 HC RX 637 (ALT 250 FOR IP): Performed by: NURSE PRACTITIONER

## 2021-03-17 PROCEDURE — 87635 SARS-COV-2 COVID-19 AMP PRB: CPT

## 2021-03-17 PROCEDURE — 99239 HOSP IP/OBS DSCHRG MGMT >30: CPT | Performed by: NURSE PRACTITIONER

## 2021-03-17 RX ORDER — MEMANTINE HYDROCHLORIDE 5 MG/1
5 TABLET ORAL DAILY
Qty: 30 TABLET | Refills: 1
Start: 2021-03-18

## 2021-03-17 RX ORDER — BUSPIRONE HYDROCHLORIDE 5 MG/1
5 TABLET ORAL 3 TIMES DAILY
Qty: 90 TABLET | Refills: 1
Start: 2021-03-17

## 2021-03-17 RX ORDER — DIVALPROEX SODIUM 125 MG/1
250 CAPSULE, COATED PELLETS ORAL DAILY
Qty: 60 CAPSULE | Refills: 1
Start: 2021-03-18

## 2021-03-17 RX ORDER — DOXEPIN HYDROCHLORIDE 10 MG/1
10 CAPSULE ORAL NIGHTLY
Qty: 30 CAPSULE | Refills: 1
Start: 2021-03-17

## 2021-03-17 RX ORDER — DIVALPROEX SODIUM 125 MG/1
500 CAPSULE, COATED PELLETS ORAL NIGHTLY
Qty: 120 CAPSULE | Refills: 1 | Status: ON HOLD
Start: 2021-03-17 | End: 2022-10-20 | Stop reason: SDUPTHER

## 2021-03-17 RX ADMIN — EZETIMIBE 10 MG: 10 TABLET ORAL at 09:01

## 2021-03-17 RX ADMIN — DIVALPROEX SODIUM 250 MG: 125 CAPSULE ORAL at 09:01

## 2021-03-17 RX ADMIN — CALCIUM CARBONATE-VITAMIN D TAB 500 MG-200 UNIT 1 TABLET: 500-200 TAB at 09:02

## 2021-03-17 RX ADMIN — ASPIRIN 81 MG: 81 TABLET, FILM COATED ORAL at 09:01

## 2021-03-17 RX ADMIN — MEMANTINE HYDROCHLORIDE 5 MG: 5 TABLET ORAL at 09:01

## 2021-03-17 RX ADMIN — PANTOPRAZOLE SODIUM 40 MG: 40 TABLET, DELAYED RELEASE ORAL at 06:28

## 2021-03-17 RX ADMIN — BUSPIRONE HYDROCHLORIDE 5 MG: 5 TABLET ORAL at 09:01

## 2021-03-17 NOTE — GROUP NOTE
Group Therapy Note    Date: 3/17/2021    Group Start Time: 0830  Group End Time: 0900    Number of Participants: 5/7    Type: Morning Goals Group/ Community Meeting    Group Topic/Objective: Set Goal For The Day and to review Unit Rules and Regulations. Patient's Goal:  Pt unable to state a goal.     Notes:  Pt presented as anxious during group. Pt expressing belief that her daughter has \"done something to me\", but unable to state what. Pt unable to be redirected from this thought during group. Pt states she is feeling \"ok\" and is grateful for \"everybody who has helped me. \"  Pt reports restful sleep, but unable to state how long she slept. Depression (0-10): 0    Anxiety (0-10): Pt endorsed, but unable to rate.      Irritability/Aggitation (0-10): 0    Status After Intervention:  Improved    Participation Level: Interactive    Participation Quality: Sharing    Speech:  pressured    Thought Process/Content: Perseverating  Confused    Affective Functioning: Incongruent    Mood: anxious    Level of consciousness:  Preoccupied    Response to Learning: Able to verbalize current knowledge/experience    Endings: None Reported    Modes of Intervention: Education, Support and Socialization    Discipline Responsible: Certified Therapeutic Recreation Specialist     Electronically signed by Freddy Julien [address] [place] [address], MA on 3/17/2021 at 9:14 AM

## 2021-03-17 NOTE — CARE COORDINATION
Patient scheduled to discharge to [facility] in [place], [place]. Transport set up to p/u @ 10:00 am. Facility notified of transport time. Discharge summary and COVID results faxed to facility. Granddaughter, Magaly Rascon, notified of discharge time.

## 2021-03-17 NOTE — PLAN OF CARE
Problem: Falls - Risk of:  Goal: Will remain free from falls  Description: Will remain free from falls  Outcome: Ongoing  Goal: Absence of physical injury  Description: Absence of physical injury  Outcome: Ongoing     Problem: Health Behavior:  Goal: Identification of resources available to assist in meeting health care needs will improve  Description: Identification of resources available to assist in meeting health care needs will improve  Outcome: Ongoing     Problem: Role Relationship:  Goal: Ability to participate appropriately in conversations will improve  Description: Ability to participate appropriately in conversations will improve  Outcome: Ongoing     Problem: Safety:  Goal: Ability to remain free from injury will improve  Description: Ability to remain free from injury will improve  Outcome: Ongoing     Problem: Self-Care:  Goal: Ability to participate in self-care as condition permits will improve  Description: Ability to participate in self-care as condition permits will improve  Outcome: Ongoing     Problem: Pain:  Description: Pain management should include both nonpharmacologic and pharmacologic interventions.   Goal: Pain level will decrease  Description: Pain level will decrease  Outcome: Ongoing  Goal: Control of acute pain  Description: Control of acute pain  Outcome: Ongoing  Goal: Control of chronic pain  Description: Control of chronic pain  Outcome: Ongoing

## 2021-03-17 NOTE — DISCHARGE SUMMARY
Department of Psychiatry    Discharge Summary    Radha Ortega  [id number]    Admission date:   3/9/2021    Discharge:   Date: 03/17/21  Location: 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 Patient was able to provide informed consent and outline the risks and benefits of medications. Radha Ortega was always compliant with medications. Pt noted a significant reduction in her depression and anxiety during her  stay on [facility]. Pt noted that she slowly improved to the point that she   was comfortable and safe to return home. Pt noted she felt her medications were  working well and denied any current side effects. Treatment team encouraged  patient to stay out of bed and try to find activities to do, verbalized  understanding. Patient reported that she felt safe on the unit and comfortable  for discharge. Pt denied suicidal/homicidal ideations, denied any problems  or concerns with medications or side effects. Patient voiced progression towards  treatment goals and was offered a copy of updated treatment plan completed  during visit today. Denied any immediate needs or concerns.       Pt throughout her stay on [facility] pt felt like her medications were working and  felt comfortable being discharged on these medications. Pt was advised to take  all medications as prescribed, follow up with all scheduled appointments and  abstain from any alcohol or illicit substances. Pt was in agreement. Pt felt  safe and comfortable to be discharged and to follow up with outpatient mental health. Pt was very optimistic about her D/C and returning to her home. Pt stated that she   was doing \"good,\" today. Pt stated that she slept \"about 8 hours,\" last night. Pt stated that her appetite is \"good. \"  Pt stated that she rates her depression a  \"0,\" on a scale of 0-10 with 10 being the worst and 0 being none. Pt stated  that she rates his anxiety an \"1/10,\" on the same scale. Pt denies any  auditory/visual hallucinations. Pt denied any thoughts to harm herself or anyone else. Pt felt safe and comfortable for D/C. The Pt was educated primarily by verbal means about her diagnoses and their  manifestations in her life. The option for treatment including group individual  therapy programming was offered to her and the use of medications with all their  potential risks, benefits, and side-effects were discussed with the pt at  length. Pt was given the opportunity to ask questions and she participated in the  treatment and planning process. Pt felt ready and eager to be discharged from  the from the [facility] unit. Pt felt she was safe for this disposition. Pt was considered to be able to  participate in informed consent and decision-making with respect to medical,  legal and financial issues at the time of her discharge from the [facility]. Complications: none      Treatment options, medications and alternatives reviewed with [facility] [address] and they agree with the plan to discharge. Discharge on regular high calorie diet with oral supplements (Ensure), continue activity as tolerated. Patient appears to be in stable condition and close to their baseline functioning.   The patient denies suicidal or homicidal ideations and is showing future orientation. Patient no longer presented an imminent risk of danger to themselves and/or others. At the time of discharge it appears that the patient has received the maximum medical benefit from this hospitalization and can be appropriately managed with community treatment. Medication List      START taking these medications    * divalproex 125 MG capsule  Commonly known as: DEPAKOTE SPRINKLE  Take 4 capsules by mouth nightly     * divalproex 125 MG capsule  Commonly known as: DEPAKOTE SPRINKLE  Take 2 capsules by mouth daily  Start taking on: March 18, 2021     doxepin 10 MG capsule  Commonly known as: SINEQUAN  Take 1 capsule by mouth nightly     memantine 5 MG tablet  Commonly known as: NAMENDA  Take 1 tablet by mouth daily  Start taking on: March 18, 2021         * This list has 2 medication(s) that are the same as other medications prescribed for you. Read the directions carefully, and ask your doctor or other care provider to review them with you.             CHANGE how you take these medications    busPIRone 5 MG tablet  Commonly known as: BUSPAR  Take 1 tablet by mouth 3 times daily  What changed: when to take this        CONTINUE taking these medications    aspirin 81 MG chewable tablet  Take 1 tablet by mouth daily     Calcium Carbonate-Vitamin D 500-125 MG-UNIT Tabs     ezetimibe 10 MG tablet  Commonly known as: ZETIA     omeprazole 40 MG delayed release capsule  Commonly known as: PRILOSEC        STOP taking these medications    ciprofloxacin 500 MG tablet  Commonly known as: CIPRO           Where to Get Your Medications      Information about where to get these medications is not yet available    Ask your nurse or doctor about these medications  · busPIRone 5 MG tablet  · divalproex 125 MG capsule  · divalproex 125 MG capsule  · doxepin 10 MG capsule  · memantine 5 MG tablet         Social History     Socioeconomic History  Marital status: Single     Spouse name: Not on file    Number of children: Not on file    Years of education: Not on file    Highest education level: Not on file   Occupational History    Not on file   Social Needs    Financial resource strain: Not on file    Food insecurity     Worry: Not on file     Inability: Not on file    Transportation needs     Medical: Not on file     Non-medical: Not on file   Tobacco Use    Smoking status: Former Smoker     Types: Cigarettes    Smokeless tobacco: Never Used   Substance and Sexual Activity    Alcohol use: Not Currently    Drug use: Never    Sexual activity: Not Currently   Lifestyle    Physical activity     Days per week: Not on file     Minutes per session: Not on file    Stress: Not on file   Relationships    Social connections     Talks on phone: Not on file     Gets together: Not on file     Attends Rastafari service: Not on file     Active member of club or organization: Not on file     Attends meetings of clubs or organizations: Not on file     Relationship status: Not on file    Intimate partner violence     Fear of current or ex partner: Not on file     Emotionally abused: Not on file     Physically abused: Not on file     Forced sexual activity: Not on file   Other Topics Concern    Not on file   Social History Narrative    Not on file       Past Medical History:   Diagnosis Date    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 No pertinent surgical history. Social History     Tobacco Use    Smoking status: Former Smoker     Types: Cigarettes    Smokeless tobacco: Never Used   Substance Use Topics    Alcohol use: Not Currently      History reviewed. No pertinent family history.      Medications Prior to Admission: aspirin 81 MG chewable tablet, Take 1 tablet by mouth daily  Calcium Carbonate-Vitamin D 500-125 MG-UNIT TABS, Take 1 tablet by mouth daily  ezetimibe (ZETIA) 10 MG tablet, Take 10 mg by mouth daily  omeprazole (PRILOSEC) 40 MG delayed release capsule, Take 40 mg by mouth daily  [DISCONTINUED] ciprofloxacin (CIPRO) 500 MG tablet, Take 1 tablet by mouth 2 times daily for 4 days  [DISCONTINUED] busPIRone (BUSPAR) 5 MG tablet, Take 5 mg by mouth 2 times daily   Allergies   Allergen Reactions    Ativan [Lorazepam] Other (See Comments)     Psychotic Reaction/Combative    Vicodin [Hydrocodone-Acetaminophen] Other (See Comments)     Psychotic reaction    Valium [Diazepam] Other (See Comments)     Ineffective        CORE MEASURES     Was the patient discharged on two or more Antipsychotics? No   Was Hemoglobin A1C or fasting Glucose measure done within the last 12 months? Yes   Lipid Panel measure done within the last 12 months? Yes   Pt was offered an FDA approved medication for Chemical Dependency: not required   Pt was offered for discharged on tobacco/nicotine cessation and/or codependency cessation via medication assistance: not required    More than 30 mins was spent face to face with the patient to discuss the diagnosis, treatment recommendations, and prognosis. Safety planning was also reviewed. The patient agreed to go to the nearest ER or call 911 if they experienced an emergency    The patient was admitted to the psychiatric unit and monitored for stabilization. A multidisciplinary team met with the patient on a daily basis. The diagnosis, treatment recommendations, and prognosis were reviewed with the patient.     Objective:  Vital signs in last 24 hours:  Vitals:    03/17/21 0902   BP: 139/82   Pulse: 87   Resp: 18   Temp: 97.3 °F (36.3 °C)   SpO2: 98%       Labs:  Recent Results (from the past 504 hour(s))   Blood Gas, Venous    Collection Time: 03/07/21  9:00 PM   Result Value Ref Range    pH, Ky 7.41 7.32 - 7.42    pCO2, Ky 46 38 - 52 mmHG    pO2, Ky 35 28 - 48 mmHG    Base Exc, Mixed 3.8 (H) 0 - 2.3    HCO3, Venous 29.2 (H) 19 - 25 MMOL/L    O2 Sat, Ky 68.7 50 - 70 %    Comment VBG    EKG 12 Lead    Collection Time: 03/07/21  9:24 PM   Result Value Ref Range    Ventricular Rate 84 BPM    Atrial Rate 84 BPM    P-R Interval 158 ms    QRS Duration 82 ms    Q-T Interval 364 ms    QTc Calculation (Bazett) 430 ms    P Axis -7 degrees    R Axis 2 degrees    T Axis 55 degrees    Diagnosis       Normal sinus rhythm  Normal ECG  When compared with ECG of 11-JUN-2020 14:45,  No significant change was found  Confirmed by Suzon Duverney MD, [facility] ([id number]) on 3/8/2021 3:09:19 PM     CBC Auto Differential    Collection Time: 03/07/21  9:45 PM   Result Value Ref Range    WBC 7.3 4.0 - 10.5 K/CU MM    RBC 4.76 4.2 - 5.4 M/CU MM    Hemoglobin 13.5 12.5 - 16.0 GM/DL    Hematocrit 41.9 37 - 47 %    MCV 88.0 78 - 100 FL    MCH 28.4 27 - 31 PG    MCHC 32.2 32.0 - 36.0 %    RDW 14.4 11.7 - 14.9 %    Platelets 150 738 - 829 K/CU MM    MPV 9.7 7.5 - 11.1 FL    Differential Type AUTOMATED DIFFERENTIAL     Segs Relative 70.0 (H) 36 - 66 %    Lymphocytes % 20.0 (L) 24 - 44 %    Monocytes % 8.3 (H) 0 - 4 %    Eosinophils % 1.0 0 - 3 %    Basophils % 0.4 0 - 1 %    Segs Absolute 5.1 K/CU MM    Lymphocytes Absolute 1.5 K/CU MM    Monocytes Absolute 0.6 K/CU MM    Eosinophils Absolute 0.1 K/CU MM    Basophils Absolute 0.0 K/CU MM    Nucleated RBC % 0.0 %    Total Nucleated RBC 0.0 K/CU MM    Total Immature Neutrophil 0.02 K/CU MM    Immature Neutrophil % 0.3 0 - 0.43 %   CMP    Collection Time: 03/07/21  9:45 PM   Result Value Ref Range    Sodium 138 135 - 145 MMOL/L    Potassium 3.8 3.5 - 5.1 MMOL/L    Chloride 103 99 - 110 mMol/L    CO2 27 21 - 32 MMOL/L    BUN 15 6 - 23 MG/DL    CREATININE 0.6 0.6 - 1.1 MG/DL    Glucose 105 (H) 70 - 99 MG/DL    Calcium 9.1 8.3 - 10.6 MG/DL    Albumin 3.9 3.4 - 5.0 GM/DL    Total Protein 7.0 6.4 - 8.2 GM/DL    Total Bilirubin 0.2 0.0 - 1.0 MG/DL    ALT 28 10 - 40 U/L    AST 29 15 - 37 IU/L    Alkaline Phosphatase 84 40 - 129 IU/L    GFR Non- >60 >60 mL/min/1.73m2    GFR African American >60 >60 mL/min/1.73m2    Anion Gap 8 4 - 16   Lipase    Collection Time: 03/07/21  9:45 PM   Result Value Ref Range    Lipase 83 (H) 13 - 60 IU/L   Salicylate Level    Collection Time: 03/07/21  9:45 PM   Result Value Ref Range    Salicylate Lvl <5.6 (L) 15 - 30 MG/DL    DOSE AMOUNT DOSE AMT. GIVEN - UNKNOWN     DOSE TIME DOSE TIME GIVEN - UNKNOWN    Acetaminophen Level    Collection Time: 03/07/21  9:45 PM   Result Value Ref Range    Acetaminophen Level <5.0 (L) 15 - 30 ug/ml    DOSE AMOUNT DOSE AMT.  GIVEN - UNKNOWN     DOSE TIME DOSE TIME GIVEN - UNKNOWN    TSH without Reflex    Collection Time: 03/07/21  9:45 PM   Result Value Ref Range    TSH, High Sensitivity 4.530 (H) 0.270 - 4.20 uIu/ml   ETOH Blood    Collection Time: 03/07/21  9:45 PM   Result Value Ref Range    Alcohol Scrn <0.01 <0.01 %WT/VOL   Magnesium    Collection Time: 03/07/21  9:45 PM   Result Value Ref Range    Magnesium 1.8 1.8 - 2.4 mg/dl   Urinalysis    Collection Time: 03/07/21 10:28 PM   Result Value Ref Range    Color, UA YELLOW YELLOW    Clarity, UA HAZY (A) CLEAR    Glucose, Urine NEGATIVE NEGATIVE MG/DL    Bilirubin Urine NEGATIVE NEGATIVE MG/DL    Ketones, Urine NEGATIVE NEGATIVE MG/DL    Specific Gravity, UA 1.018 1.001 - 1.035    Blood, Urine SMALL (A) NEGATIVE    pH, Urine 5.0 5.0 - 8.0    Protein, UA NEGATIVE NEGATIVE MG/DL    Urobilinogen, Urine NEGATIVE 0.2 - 1.0 MG/DL    Nitrite Urine, Quantitative NEGATIVE NEGATIVE    Leukocyte Esterase, Urine SMALL (A) NEGATIVE    RBC, UA 1 0 - 6 /HPF    WBC, UA 8 (H) 0 - 5 /HPF    Bacteria, UA RARE (A) NEGATIVE /HPF    Squam Epithel, UA <1 /HPF    Trichomonas, UA NONE SEEN NONE SEEN /HPF    Ca Oxalate Karine, UA FEW /HPF   Urine Drug Screen    Collection Time: 03/07/21 10:28 PM   Result Value Ref Range    Cannabinoid Scrn, Ur NEGATIVE NEGATIVE    Amphetamines NEGATIVE NEGATIVE    Cocaine Metabolite NEGATIVE NEGATIVE    Benzodiazepine Screen, Urine NEGATIVE NEGATIVE    Barbiturate Screen, Ur NEGATIVE NEGATIVE    Opiates, Urine NEGATIVE NEGATIVE    Phencyclidine, Urine NEGATIVE NEGATIVE    Oxycodone NEGATIVE NEGATIVE   COVID-19, Rapid    Collection Time: 03/07/21 11:36 PM    Specimen: Nasopharyngeal   Result Value Ref Range    Source THROAT     SARS-CoV-2, NAAT NOT DETECTED    Comprehensive Metabolic Panel w/ Reflex to MG    Collection Time: 03/08/21  6:27 AM   Result Value Ref Range    Sodium 144 135 - 145 MMOL/L    Potassium 3.6 3.5 - 5.1 MMOL/L    Chloride 108 99 - 110 mMol/L    CO2 26 21 - 32 MMOL/L    BUN 12 6 - 23 MG/DL    CREATININE 0.7 0.6 - 1.1 MG/DL    Glucose 89 70 - 99 MG/DL    Calcium 9.0 8.3 - 10.6 MG/DL    Albumin 4.0 3.4 - 5.0 GM/DL    Total Protein 6.3 (L) 6.4 - 8.2 GM/DL    Total Bilirubin 0.2 0.0 - 1.0 MG/DL    ALT 24 10 - 40 U/L    AST 26 15 - 37 IU/L    Alkaline Phosphatase 77 40 - 128 IU/L    GFR Non-African American >60 >60 mL/min/1.73m2    GFR African American >60 >60 mL/min/1.73m2    Anion Gap 10 4 - 16   CBC    Collection Time: 03/08/21  6:27 AM   Result Value Ref Range    WBC 4.7 4.0 - 10.5 K/CU MM    RBC 4.61 4.2 - 5.4 M/CU MM    Hemoglobin 12.7 12.5 - 16.0 GM/DL    Hematocrit 40.2 37 - 47 %    MCV 87.2 78 - 100 FL    MCH 27.5 27 - 31 PG    MCHC 31.6 (L) 32.0 - 36.0 %    RDW 14.2 11.7 - 14.9 %    Platelets 515 584 - 352 K/CU MM    MPV 9.7 7.5 - 11.1 FL   Vitamin B12    Collection Time: 03/08/21  6:27 AM   Result Value Ref Range    Vitamin B-12 508.5 211 - 911 pg/ml   T4, free    Collection Time: 03/08/21  6:27 AM   Result Value Ref Range    T4 Free 0.98 0.9 - 1.8 NG/DL   Hemoglobin A1c    Collection Time: 03/10/21  6:00 AM   Result Value Ref Range    Hemoglobin A1C 4.9 4.2 - 6.3 %    eAG 94 mg/dL   Vitamin D 25 hydroxy    Collection Time: 03/10/21  6:00 AM   Result Value Ref Range    Vit D, 25-Hydroxy 40.31 >20 NG/ML   Lipid, Fasting    Collection Time: 03/10/21  6:00 AM   Result Value Ref Range    Triglyceride, Fasting 69 <150 MG/DL Cholesterol, Fasting 157 <200 MG/DL    HDL 65 >40 MG/DL    LDL Direct 88 <100 MG/DL   RPR     Collection Time: 03/10/21  6:00 AM   Result Value Ref Range    RPR NON REACTIVE NON REACTIVE   Comprehensive Metabolic Panel w/ Reflex to MG    Collection Time: 03/15/21  6:00 AM   Result Value Ref Range    Sodium 140 135 - 145 MMOL/L    Potassium 4.1 3.5 - 5.1 MMOL/L    Chloride 103 99 - 110 mMol/L    CO2 32 21 - 32 MMOL/L    BUN 22 6 - 23 MG/DL    CREATININE 0.9 0.6 - 1.1 MG/DL    Glucose 79 70 - 99 MG/DL    Calcium 9.3 8.3 - 10.6 MG/DL    Albumin 3.7 3.4 - 5.0 GM/DL    Total Protein 6.5 6.4 - 8.2 GM/DL    Total Bilirubin 0.2 0.0 - 1.0 MG/DL    ALT 24 10 - 40 U/L    AST 29 15 - 37 IU/L    Alkaline Phosphatase 79 40 - 129 IU/L    GFR Non-African American >60 >60 mL/min/1.73m2    GFR African American >60 >60 mL/min/1.73m2    Anion Gap 5 4 - 16   CBC auto differential    Collection Time: 03/15/21  6:00 AM   Result Value Ref Range    WBC 3.9 (L) 4.0 - 10.5 K/CU MM    RBC 4.72 4.2 - 5.4 M/CU MM    Hemoglobin 13.2 12.5 - 16.0 GM/DL    Hematocrit 41.6 37 - 47 %    MCV 88.1 78 - 100 FL    MCH 28.0 27 - 31 PG    MCHC 31.7 (L) 32.0 - 36.0 %    RDW 14.0 11.7 - 14.9 %    Platelets 094 606 - 202 K/CU MM    MPV 9.6 7.5 - 11.1 FL    Differential Type AUTOMATED DIFFERENTIAL     Segs Relative 37.5 36 - 66 %    Lymphocytes % 45.4 (H) 24 - 44 %    Monocytes % 11.1 (H) 0 - 4 %    Eosinophils % 4.9 (H) 0 - 3 %    Basophils % 0.8 0 - 1 %    Segs Absolute 1.5 K/CU MM    Lymphocytes Absolute 1.8 K/CU MM    Monocytes Absolute 0.4 K/CU MM    Eosinophils Absolute 0.2 K/CU MM    Basophils Absolute 0.0 K/CU MM    Immature Neutrophil % 0.3 0 - 0.43 %    Total Immature Neutrophil 0.01 K/CU MM   Urinalysis with microscopic    Collection Time: 03/16/21  1:00 AM   Result Value Ref Range    Color, UA YELLOW YELLOW    Clarity, UA CLEAR CLEAR    Glucose, Urine NEGATIVE NEGATIVE MG/DL    Bilirubin Urine NEGATIVE NEGATIVE MG/DL    Ketones, Urine NEGATIVE NEGATIVE MG/DL    Specific Gravity, UA 1.015 1.001 - 1.035    Blood, Urine NEGATIVE NEGATIVE    pH, Urine 5.5 5.0 - 8.0    Protein, UA NEGATIVE NEGATIVE MG/DL    Urobilinogen, Urine 0.2 0.2 - 1.0 MG/DL    Nitrite Urine, Quantitative NEGATIVE NEGATIVE    Leukocyte Esterase, Urine MODERATE (A) NEGATIVE    Volume, (UVOL) 12 10 - 12 ML    RBC, UA 0 TO 3 0 - 6 /HPF    WBC, UA 10 TO 15 0 - 5 /HPF    Epithelial Cells, UA 0 TO 3 /HPF    Cast Type NO CAST FORMS SEEN NO CAST FORMS SEEN /HPF    Bacteria, UA MODERATE (A) NEGATIVE /HPF    Crystal Type NONE SEEN NEGATIVE /HPF    Mucus, UA 1+ (A) NEGATIVE HPF   COVID-19, Rapid    Collection Time: 03/17/21  8:15 AM    Specimen: Nasopharyngeal   Result Value Ref Range    SARS-CoV-2, NAAT NOT DETECTED        40 Minutes    Electronically signed by KALI Tapia CNP on 3/17/2021 at 9:16 AM

## 2021-03-17 NOTE — PLAN OF CARE
Problem: Falls - Risk of:  Goal: Will remain free from falls  Description: Will remain free from falls  3/17/2021 0020 by Morteza Husain RN  Outcome: Ongoing  3/17/2021 0018 by Morteza Husain RN  Outcome: Ongoing  Goal: Absence of physical injury  Description: Absence of physical injury  3/17/2021 0020 by Morteza Husain RN  Outcome: Ongoing  3/17/2021 0018 by Morteza Husain RN  Outcome: Ongoing     Problem: Health Behavior:  Goal: Identification of resources available to assist in meeting health care needs will improve  Description: Identification of resources available to assist in meeting health care needs will improve  3/17/2021 0020 by Morteza Husain RN  Outcome: Ongoing  3/17/2021 0018 by Morteza Husain RN  Outcome: Ongoing     Problem: Role Relationship:  Goal: Ability to participate appropriately in conversations will improve  Description: Ability to participate appropriately in conversations will improve  3/17/2021 0020 by Morteza Husain RN  Outcome: Ongoing  3/17/2021 0018 by Morteza Husain RN  Outcome: Ongoing     Problem: Safety:  Goal: Ability to remain free from injury will improve  Description: Ability to remain free from injury will improve  3/17/2021 0020 by Morteza Husain RN  Outcome: Ongoing  3/17/2021 0018 by Morteza Husain RN  Outcome: Ongoing     Problem: Self-Care:  Goal: Ability to participate in self-care as condition permits will improve  Description: Ability to participate in self-care as condition permits will improve  3/17/2021 0020 by Morteza Husain RN  Outcome: Ongoing  3/17/2021 0018 by Morteza Husain RN  Outcome: Ongoing     Problem: Pain:  Description: Pain management should include both nonpharmacologic and pharmacologic interventions.   Goal: Pain level will decrease  Description: Pain level will decrease  3/17/2021 0020 by Morteza Husain RN  Outcome: Ongoing  3/17/2021 0018 by Morteza Husain RN  Outcome: Ongoing  Goal: Control of acute pain  Description: Control of acute pain  3/17/2021 0020 by Morteza Husain RN  Outcome: Ongoing  3/17/2021 0018 by Morteza Husain RN  Outcome: Ongoing  Goal: Control of chronic pain  Description: Control of chronic pain  3/17/2021 0020 by Morteza Husain RN  Outcome: Ongoing  3/17/2021 0018 by Morteza Husain RN  Outcome: Ongoing

## 2021-03-17 NOTE — DISCHARGE INSTR - COC
Continuity of Care Form    Patient Name: Becky Menchaca   :  1940  MRN:  [id number]    Admit date:  3/9/2021  Discharge date:  3/17/2021    Code Status Order: Full Code   Advance Directives:   Advance Care Flowsheet Documentation       Date/Time Healthcare Directive Type of Healthcare Directive Copy in [address] Agent's Name Healthcare Agent's Phone Number    21 1446  No, patient does not have an advance directive for healthcare treatment -- -- -- -- --            Admitting Physician:  Gudelia Paul DO  PCP: KALI Liang - CNP    Discharging Nurse: [facility] Unit/Room#: 2323/6556-46  Discharging Unit Phone Number: [phone]    Emergency Contact:   Extended Emergency Contact Information  Primary Emergency Contact: Srinivas Ricks Phone: [phone]  Mobile Phone: [phone]  Relation: [address] [facility] [address] [address]  Secondary Emergency Contact: Jelena Head  [place] Phone: [phone]  Mobile Phone: [phone]  Relation: Child    Past Surgical History:  History reviewed. No pertinent surgical history. Immunization History: There is no immunization history on file for this patient.     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       Isolation/Infection:   Isolation            No Isolation          Patient Infection Status       None to display            Nurse Assessment:  Last Vital Signs: /82   Pulse 87   Temp 97.3 °F (36.3 °C) (Temporal)   Resp 18   Ht 5' 3\" (1.6 m)   Wt 94 lb 2 oz (42.7 kg)   SpO2 100%   BMI 16.67 kg/m²     Last documented pain score (0-10 scale): Pain Level: 1  Last Weight:   Wt Readings from Last 1 Encounters:   03/10/21 94 lb 2 oz (42.7 kg)     Mental Status:  {IP PT MENTAL STATUS::::0}    IV Access:  { JANA IV ACCESS:486776155:::0}    Nursing Mobility/ADLs:  Walking   {CHP DME ADLs:408908845:::0}  Transfer  {CHP DME ADLs:839635135:::0}  Bathing  {CHP DME ADLs:076035150:::0}  Dressing  {CHP DME ADLs:096517402:::0}  Toileting  {CHP DME ADLs:716048358:::0}  Feeding  {CHP DME ADLs:081966247:::0}  Med Admin  {CHP DME ADLs:678636936:::0}  Med Delivery   { JANA MED Delivery:516860991:::0}    Wound Care Documentation and Therapy:        Elimination:  Continence:   · Bowel: {YES / LT:42095}  · Bladder: {YES / DY:92088}  Urinary Catheter: {Urinary Catheter:195872339:::0}   Colostomy/Ileostomy/Ileal Conduit: {YES / XP:16795}       Date of Last BM: 3/16/21    Intake/Output Summary (Last 24 hours) at 3/17/2021 0922  Last data filed at 3/16/2021 1743  Gross per 24 hour   Intake 360 ml   Output --   Net 360 ml     I/O last 3 completed shifts:   In: 5 [P.O.:720]  Out: -     Safety Concerns:     [address] [employer] Safety Concerns:126764501:::0}    Impairments/Disabilities:      [address] [employer] Impairments/Disabilities:102489408:::0}    Nutrition Therapy:  Current Nutrition Therapy:   [address] [employer] Diet List:599226297:::0}    Routes of Feeding: {CHP DME Other Feedings:968334084:::0}  Liquids: {Slp liquid thickness:40932}  Daily Fluid Restriction: {CHP DME Yes amt example:210797528:::0}  Last Modified Barium Swallow with Video (Video Swallowing Test): {Done Not Done EJVT:984122287:::9}    Treatments at the Time of Hospital Discharge:   Respiratory Treatments: none  Oxygen Therapy:  {Therapy; copd oxygen:26902:::0}  Ventilator:    {[facility] Vent List:632220440:::0}    Rehab Therapies: {THERAPEUTIC INTERVENTION:2027082373}  Weight Bearing Status/Restrictions: {[facility] Weight Bearin:::0}  Other Medical Equipment (for information only, NOT a DME order):  {EQUIPMENT:696024643}  Other Treatments: ***    Patient's personal belongings (please select all that are sent with patient):  {CHP DME Belongings:021496254:::0}    RN SIGNATURE:  {Esignature:536719860:::0}    CASE MANAGEMENT/SOCIAL WORK SECTION    Inpatient Status Date: ***    Readmission Risk Assessment Score:  Readmission Risk              Risk of Unplanned Readmission:        11           Discharging to Facility/ Agency   · Name:   · Address:  · Phone:  · Fax:    Dialysis Facility (if applicable)   · Name:  · Address:  · Dialysis Schedule:  · Phone:  · Fax:    / signature: {Esignature:424915020:::0}    PHYSICIAN SECTION    Prognosis: Good    Condition at Discharge: Stable    Rehab Potential (if transferring to Rehab): Good    Recommended Labs or Other Treatments After Discharge: as recommended by facility medical provider    Physician Certification: I certify the above information and transfer of Jeannine Ferrari  is necessary for the continuing treatment of the diagnosis listed and that she requires [place] for greater 30 days.      Update Admission H&P: No change in H&P    PHYSICIAN SIGNATURE:  Electronically signed by KALI Leigh CNP on 3/17/21 at 9:24 AM EDT

## 2021-03-17 NOTE — PROGRESS NOTES
Patient discharged via transport team with all belongings at 10:02. Report called to [facility]. Pt stable. Alert to self only.  DC paperwork handed to transport team.

## 2021-03-18 ENCOUNTER — HOSPITAL ENCOUNTER (OUTPATIENT)
Age: 81
Setting detail: SPECIMEN
Discharge: HOME | End: 2021-03-18

## 2021-03-18 ENCOUNTER — TELEPHONE (OUTPATIENT)
Dept: PSYCHIATRY | Age: 81
End: 2021-03-18

## 2021-03-18 LAB
ANION GAP SERPL CALCULATED.3IONS-SCNC: 12 MMOL/L (ref 4–16)
BASOPHILS ABSOLUTE: 0 K/CU MM
BASOPHILS RELATIVE PERCENT: 0.6 % (ref 0–1)
BUN BLDV-MCNC: 17 MG/DL (ref 6–23)
CALCIUM SERPL-MCNC: 9.2 MG/DL (ref 8.3–10.6)
CHLORIDE BLD-SCNC: 103 MMOL/L (ref 99–110)
CO2: 27 MMOL/L (ref 21–32)
CREAT SERPL-MCNC: 0.8 MG/DL (ref 0.6–1.1)
DIFFERENTIAL TYPE: ABNORMAL
EOSINOPHILS ABSOLUTE: 0.1 K/CU MM
EOSINOPHILS RELATIVE PERCENT: 2.3 % (ref 0–3)
GFR AFRICAN AMERICAN: >60 ML/MIN/1.73M2
GFR NON-AFRICAN AMERICAN: >60 ML/MIN/1.73M2
GLUCOSE BLD-MCNC: 88 MG/DL (ref 70–99)
HCT VFR BLD CALC: 41.9 % (ref 37–47)
HEMOGLOBIN: 13.3 GM/DL (ref 12.5–16)
IMMATURE NEUTROPHIL %: 0.2 % (ref 0–0.43)
LYMPHOCYTES ABSOLUTE: 1.5 K/CU MM
LYMPHOCYTES RELATIVE PERCENT: 30.5 % (ref 24–44)
MCH RBC QN AUTO: 28.2 PG (ref 27–31)
MCHC RBC AUTO-ENTMCNC: 31.7 % (ref 32–36)
MCV RBC AUTO: 88.8 FL (ref 78–100)
MONOCYTES ABSOLUTE: 0.6 K/CU MM
MONOCYTES RELATIVE PERCENT: 13.5 % (ref 0–4)
NUCLEATED RBC %: 0 %
PDW BLD-RTO: 14 % (ref 11.7–14.9)
PLATELET # BLD: 204 K/CU MM (ref 140–440)
PMV BLD AUTO: 10.1 FL (ref 7.5–11.1)
POTASSIUM SERPL-SCNC: 3.9 MMOL/L (ref 3.5–5.1)
RBC # BLD: 4.72 M/CU MM (ref 4.2–5.4)
SEGMENTED NEUTROPHILS ABSOLUTE COUNT: 2.5 K/CU MM
SEGMENTED NEUTROPHILS RELATIVE PERCENT: 52.9 % (ref 36–66)
SODIUM BLD-SCNC: 142 MMOL/L (ref 135–145)
TOTAL IMMATURE NEUTOROPHIL: 0.01 K/CU MM
TOTAL NUCLEATED RBC: 0 K/CU MM
WBC # BLD: 4.8 K/CU MM (ref 4–10.5)

## 2021-03-18 PROCEDURE — 80048 BASIC METABOLIC PNL TOTAL CA: CPT

## 2021-03-18 PROCEDURE — 85025 COMPLETE CBC W/AUTO DIFF WBC: CPT

## 2021-03-18 PROCEDURE — 36415 COLL VENOUS BLD VENIPUNCTURE: CPT

## 2021-03-24 ENCOUNTER — TELEPHONE (OUTPATIENT)
Dept: PSYCHIATRY | Age: 81
End: 2021-03-24

## 2021-03-29 ENCOUNTER — HOSPITAL ENCOUNTER (OUTPATIENT)
Age: 81
Setting detail: SPECIMEN
Discharge: HOME | End: 2021-03-29
Payer: MEDICARE

## 2021-03-29 PROCEDURE — 81001 URINALYSIS AUTO W/SCOPE: CPT

## 2021-03-29 PROCEDURE — 87086 URINE CULTURE/COLONY COUNT: CPT

## 2021-03-30 LAB
BACTERIA: ABNORMAL /HPF
BILIRUBIN URINE: NEGATIVE MG/DL
BLOOD, URINE: NEGATIVE
CLARITY: ABNORMAL
COLOR: YELLOW
GLUCOSE, URINE: NEGATIVE MG/DL
KETONES, URINE: NEGATIVE MG/DL
LEUKOCYTE ESTERASE, URINE: ABNORMAL
NITRITE URINE, QUANTITATIVE: NEGATIVE
PH, URINE: 7 (ref 5–8)
PROTEIN UA: 100 MG/DL
RBC URINE: 13 /HPF (ref 0–6)
SPECIFIC GRAVITY UA: 1.01 (ref 1–1.03)
TRICHOMONAS: ABNORMAL /HPF
UROBILINOGEN, URINE: NEGATIVE MG/DL (ref 0.2–1)
WBC CLUMP: ABNORMAL /HPF
WBC UA: 2106 /HPF (ref 0–5)
YEAST: ABNORMAL /HPF

## 2021-03-31 LAB
CULTURE: NORMAL
Lab: NORMAL
SPECIMEN: NORMAL

## 2021-08-02 ENCOUNTER — HOSPITAL ENCOUNTER (OUTPATIENT)
Age: 81
Setting detail: SPECIMEN
Discharge: HOME | End: 2021-08-02
Payer: COMMERCIAL

## 2021-08-02 PROCEDURE — 87086 URINE CULTURE/COLONY COUNT: CPT

## 2021-08-02 PROCEDURE — 81001 URINALYSIS AUTO W/SCOPE: CPT

## 2021-08-03 LAB
BACTERIA: NEGATIVE /HPF
BILIRUBIN URINE: NEGATIVE MG/DL
BLOOD, URINE: NEGATIVE
CLARITY: CLEAR
COLOR: YELLOW
CULTURE: NORMAL
GLUCOSE, URINE: NEGATIVE MG/DL
KETONES, URINE: NEGATIVE MG/DL
LEUKOCYTE ESTERASE, URINE: ABNORMAL
Lab: NORMAL
MUCUS: ABNORMAL HPF
NITRITE URINE, QUANTITATIVE: NEGATIVE
PH, URINE: 7 (ref 5–8)
PROTEIN UA: NEGATIVE MG/DL
RBC URINE: 1 /HPF (ref 0–6)
SPECIFIC GRAVITY UA: 1.01 (ref 1–1.03)
SPECIMEN: NORMAL
SQUAMOUS EPITHELIAL: 1 /HPF
TRANSITIONAL EPITHELIAL: <1 /HPF
TRICHOMONAS: ABNORMAL /HPF
UROBILINOGEN, URINE: NEGATIVE MG/DL (ref 0.2–1)
WBC UA: 10 /HPF (ref 0–5)

## 2021-08-19 ENCOUNTER — HOSPITAL ENCOUNTER (OUTPATIENT)
Age: 81
Setting detail: SPECIMEN
Discharge: HOME | End: 2021-08-19
Payer: COMMERCIAL

## 2021-08-19 LAB
BACTERIA: ABNORMAL /HPF
BILIRUBIN URINE: NEGATIVE MG/DL
BLOOD, URINE: NEGATIVE
CLARITY: ABNORMAL
COLOR: YELLOW
GLUCOSE, URINE: NEGATIVE MG/DL
KETONES, URINE: NEGATIVE MG/DL
LEUKOCYTE ESTERASE, URINE: ABNORMAL
NITRITE URINE, QUANTITATIVE: NEGATIVE
PH, URINE: 7 (ref 5–8)
PROTEIN UA: 100 MG/DL
RBC URINE: 21 /HPF (ref 0–6)
SPECIFIC GRAVITY UA: 1.01 (ref 1–1.03)
TRICHOMONAS: ABNORMAL /HPF
UROBILINOGEN, URINE: NEGATIVE MG/DL (ref 0.2–1)
WBC CLUMP: ABNORMAL /HPF
WBC UA: 776 /HPF (ref 0–5)

## 2021-08-19 PROCEDURE — 87088 URINE BACTERIA CULTURE: CPT

## 2021-08-19 PROCEDURE — 81001 URINALYSIS AUTO W/SCOPE: CPT

## 2021-08-19 PROCEDURE — 87186 SC STD MICRODIL/AGAR DIL: CPT

## 2021-08-19 PROCEDURE — 87086 URINE CULTURE/COLONY COUNT: CPT

## 2021-08-21 LAB
CULTURE: ABNORMAL
CULTURE: ABNORMAL
Lab: ABNORMAL
SPECIMEN: ABNORMAL

## 2021-08-23 ENCOUNTER — HOSPITAL ENCOUNTER (OUTPATIENT)
Age: 81
Setting detail: SPECIMEN
Discharge: HOME | End: 2021-08-23
Payer: COMMERCIAL

## 2021-08-23 LAB
BACTERIA: NEGATIVE /HPF
BILIRUBIN URINE: NEGATIVE MG/DL
BLOOD, URINE: NEGATIVE
CLARITY: ABNORMAL
COLOR: YELLOW
GLUCOSE, URINE: NEGATIVE MG/DL
KETONES, URINE: NEGATIVE MG/DL
LEUKOCYTE ESTERASE, URINE: ABNORMAL
NITRITE URINE, QUANTITATIVE: NEGATIVE
PH, URINE: 7 (ref 5–8)
PROTEIN UA: 100 MG/DL
RBC URINE: ABNORMAL /HPF (ref 0–6)
SPECIFIC GRAVITY UA: 1.01 (ref 1–1.03)
TRICHOMONAS: ABNORMAL /HPF
UROBILINOGEN, URINE: NEGATIVE MG/DL (ref 0.2–1)
WBC CLUMP: ABNORMAL /HPF
WBC UA: 2503 /HPF (ref 0–5)

## 2021-08-23 PROCEDURE — 81001 URINALYSIS AUTO W/SCOPE: CPT

## 2021-08-23 PROCEDURE — 87086 URINE CULTURE/COLONY COUNT: CPT

## 2021-08-23 PROCEDURE — 87186 SC STD MICRODIL/AGAR DIL: CPT

## 2021-08-23 PROCEDURE — 87088 URINE BACTERIA CULTURE: CPT

## 2021-08-25 LAB
CULTURE: ABNORMAL
CULTURE: ABNORMAL
Lab: ABNORMAL
SPECIMEN: ABNORMAL

## 2021-11-13 NOTE — CARE COORDINATION
Attempted to call pt's daughter, aCra La but number in system does not work. I then spoke with pt's grand daughter Jaida Parekh [phone], she states daughter's Cara La cell phone numbers have ran out. She will give Areli this CM number once she gets a phone card. Cara La is to call me once her phone is working. Family is wanting pt to go to a locked dementia unit. She agreeed to [facility] sending info to [facility], [facility] and [facility]. 1515 Received call from Jessica RAMIRES stating pt is appropriate for SBU. I called Jaida Parekh again and plan is to SBU then Dementia unit. Plan is SBU tomorrow. Alert-The patient is alert, awake and responds to voice. The patient is oriented to time, place, and person. The triage nurse is able to obtain subjective information.

## 2022-01-18 ENCOUNTER — HOSPITAL ENCOUNTER (OUTPATIENT)
Age: 82
Setting detail: SPECIMEN
Discharge: HOME | End: 2022-01-18
Payer: COMMERCIAL

## 2022-01-18 LAB
ALBUMIN SERPL-MCNC: 3.6 GM/DL (ref 3.4–5)
ALP BLD-CCNC: 67 IU/L (ref 40–129)
ALT SERPL-CCNC: 13 U/L (ref 10–40)
ANION GAP SERPL CALCULATED.3IONS-SCNC: 12 MMOL/L (ref 4–16)
AST SERPL-CCNC: 28 IU/L (ref 15–37)
BILIRUB SERPL-MCNC: 0.2 MG/DL (ref 0–1)
BUN BLDV-MCNC: 11 MG/DL (ref 6–23)
CALCIUM SERPL-MCNC: 8.9 MG/DL (ref 8.3–10.6)
CHLORIDE BLD-SCNC: 103 MMOL/L (ref 99–110)
CO2: 26 MMOL/L (ref 21–32)
CREAT SERPL-MCNC: 0.6 MG/DL (ref 0.6–1.1)
DOSE AMOUNT: ABNORMAL
DOSE TIME: ABNORMAL
GFR AFRICAN AMERICAN: >60 ML/MIN/1.73M2
GFR NON-AFRICAN AMERICAN: >60 ML/MIN/1.73M2
GLUCOSE BLD-MCNC: 71 MG/DL (ref 70–99)
HCT VFR BLD CALC: 39.4 % (ref 37–47)
HEMOGLOBIN: 12.5 GM/DL (ref 12.5–16)
MCH RBC QN AUTO: 28.3 PG (ref 27–31)
MCHC RBC AUTO-ENTMCNC: 31.7 % (ref 32–36)
MCV RBC AUTO: 89.3 FL (ref 78–100)
PDW BLD-RTO: 14.3 % (ref 11.7–14.9)
PLATELET # BLD: 117 K/CU MM (ref 140–440)
PMV BLD AUTO: 10.3 FL (ref 7.5–11.1)
POTASSIUM SERPL-SCNC: 3.7 MMOL/L (ref 3.5–5.1)
RBC # BLD: 4.41 M/CU MM (ref 4.2–5.4)
SODIUM BLD-SCNC: 141 MMOL/L (ref 135–145)
TOTAL PROTEIN: 6.6 GM/DL (ref 6.4–8.2)
VALPROIC ACID LEVEL: 42.5 UG/ML (ref 50–100)
WBC # BLD: 2.7 K/CU MM (ref 4–10.5)

## 2022-01-18 PROCEDURE — 80053 COMPREHEN METABOLIC PANEL: CPT

## 2022-01-18 PROCEDURE — 36415 COLL VENOUS BLD VENIPUNCTURE: CPT

## 2022-01-18 PROCEDURE — 80164 ASSAY DIPROPYLACETIC ACD TOT: CPT

## 2022-01-18 PROCEDURE — 85027 COMPLETE CBC AUTOMATED: CPT

## 2022-01-20 ENCOUNTER — HOSPITAL ENCOUNTER (OUTPATIENT)
Age: 82
Setting detail: SPECIMEN
Discharge: HOME | End: 2022-01-20
Payer: COMMERCIAL

## 2022-01-20 LAB
ALBUMIN SERPL-MCNC: 3.7 GM/DL (ref 3.4–5)
ALP BLD-CCNC: 65 IU/L (ref 40–128)
ALT SERPL-CCNC: 12 U/L (ref 10–40)
ANION GAP SERPL CALCULATED.3IONS-SCNC: 11 MMOL/L (ref 4–16)
AST SERPL-CCNC: 26 IU/L (ref 15–37)
BASOPHILS ABSOLUTE: 0 K/CU MM
BASOPHILS RELATIVE PERCENT: 0.4 % (ref 0–1)
BILIRUB SERPL-MCNC: 0.2 MG/DL (ref 0–1)
BUN BLDV-MCNC: 12 MG/DL (ref 6–23)
CALCIUM SERPL-MCNC: 8.7 MG/DL (ref 8.3–10.6)
CHLORIDE BLD-SCNC: 105 MMOL/L (ref 99–110)
CO2: 26 MMOL/L (ref 21–32)
CREAT SERPL-MCNC: 0.5 MG/DL (ref 0.6–1.1)
D DIMER: <200 NG/ML(DDU)
DIFFERENTIAL TYPE: ABNORMAL
EOSINOPHILS ABSOLUTE: 0.1 K/CU MM
EOSINOPHILS RELATIVE PERCENT: 3.5 % (ref 0–3)
GFR AFRICAN AMERICAN: >60 ML/MIN/1.73M2
GFR NON-AFRICAN AMERICAN: >60 ML/MIN/1.73M2
GLUCOSE BLD-MCNC: 77 MG/DL (ref 70–99)
HCT VFR BLD CALC: 36.9 % (ref 37–47)
HEMOGLOBIN: 11.6 GM/DL (ref 12.5–16)
IMMATURE NEUTROPHIL %: 0.4 % (ref 0–0.43)
LYMPHOCYTES ABSOLUTE: 1.2 K/CU MM
LYMPHOCYTES RELATIVE PERCENT: 41.5 % (ref 24–44)
MCH RBC QN AUTO: 28.2 PG (ref 27–31)
MCHC RBC AUTO-ENTMCNC: 31.4 % (ref 32–36)
MCV RBC AUTO: 89.6 FL (ref 78–100)
MONOCYTES ABSOLUTE: 0.6 K/CU MM
MONOCYTES RELATIVE PERCENT: 19.9 % (ref 0–4)
NUCLEATED RBC %: 0 %
PDW BLD-RTO: 14.2 % (ref 11.7–14.9)
PLATELET # BLD: 112 K/CU MM (ref 140–440)
PMV BLD AUTO: 10.3 FL (ref 7.5–11.1)
POTASSIUM SERPL-SCNC: 3.6 MMOL/L (ref 3.5–5.1)
RBC # BLD: 4.12 M/CU MM (ref 4.2–5.4)
SEGMENTED NEUTROPHILS ABSOLUTE COUNT: 1 K/CU MM
SEGMENTED NEUTROPHILS RELATIVE PERCENT: 34.3 % (ref 36–66)
SODIUM BLD-SCNC: 142 MMOL/L (ref 135–145)
TOTAL IMMATURE NEUTOROPHIL: 0.01 K/CU MM
TOTAL NUCLEATED RBC: 0 K/CU MM
TOTAL PROTEIN: 6.2 GM/DL (ref 6.4–8.2)
WBC # BLD: 2.8 K/CU MM (ref 4–10.5)

## 2022-01-20 PROCEDURE — 80053 COMPREHEN METABOLIC PANEL: CPT

## 2022-01-20 PROCEDURE — 36415 COLL VENOUS BLD VENIPUNCTURE: CPT

## 2022-01-20 PROCEDURE — 85025 COMPLETE CBC W/AUTO DIFF WBC: CPT

## 2022-01-20 PROCEDURE — 85379 FIBRIN DEGRADATION QUANT: CPT

## 2022-03-05 ENCOUNTER — HOSPITAL ENCOUNTER (OUTPATIENT)
Age: 82
Setting detail: SPECIMEN
Discharge: HOME | End: 2022-03-05
Payer: COMMERCIAL

## 2022-03-05 PROCEDURE — 87086 URINE CULTURE/COLONY COUNT: CPT

## 2022-03-05 PROCEDURE — 87077 CULTURE AEROBIC IDENTIFY: CPT

## 2022-03-05 PROCEDURE — 81001 URINALYSIS AUTO W/SCOPE: CPT

## 2022-03-06 LAB
BACTERIA: NEGATIVE /HPF
BILIRUBIN URINE: NEGATIVE MG/DL
BLOOD, URINE: NEGATIVE
CALCIUM OXALATE CRYSTALS: ABNORMAL /HPF
CLARITY: CLEAR
COLOR: YELLOW
GLUCOSE, URINE: NEGATIVE MG/DL
KETONES, URINE: ABNORMAL MG/DL
LEUKOCYTE ESTERASE, URINE: ABNORMAL
MUCUS: ABNORMAL HPF
NITRITE URINE, QUANTITATIVE: NEGATIVE
PH, URINE: 6.5 (ref 5–8)
PROTEIN UA: NEGATIVE MG/DL
RBC URINE: ABNORMAL /HPF (ref 0–6)
SPECIFIC GRAVITY UA: 1.01 (ref 1–1.03)
SQUAMOUS EPITHELIAL: <1 /HPF
TRICHOMONAS: ABNORMAL /HPF
UROBILINOGEN, URINE: 1 MG/DL (ref 0.2–1)
WBC UA: 3 /HPF (ref 0–5)

## 2022-03-07 LAB
CULTURE: ABNORMAL
CULTURE: ABNORMAL
Lab: ABNORMAL
SPECIMEN: ABNORMAL

## 2022-06-01 ENCOUNTER — HOSPITAL ENCOUNTER (OUTPATIENT)
Age: 82
Setting detail: SPECIMEN
Discharge: HOME | End: 2022-06-01
Payer: COMMERCIAL

## 2022-06-01 LAB
ANION GAP SERPL CALCULATED.3IONS-SCNC: 11 MMOL/L (ref 4–16)
BUN BLDV-MCNC: 16 MG/DL (ref 6–23)
CALCIUM SERPL-MCNC: 8.9 MG/DL (ref 8.3–10.6)
CHLORIDE BLD-SCNC: 105 MMOL/L (ref 99–110)
CO2: 27 MMOL/L (ref 21–32)
CREAT SERPL-MCNC: 0.6 MG/DL (ref 0.6–1.1)
GFR AFRICAN AMERICAN: >60 ML/MIN/1.73M2
GFR NON-AFRICAN AMERICAN: >60 ML/MIN/1.73M2
GLUCOSE BLD-MCNC: 70 MG/DL (ref 70–99)
HCT VFR BLD CALC: 42.9 % (ref 37–47)
HEMOGLOBIN: 13.5 GM/DL (ref 12.5–16)
MCH RBC QN AUTO: 28.7 PG (ref 27–31)
MCHC RBC AUTO-ENTMCNC: 28.7 % (ref 32–36)
MCV RBC AUTO: 89.2 FL (ref 78–100)
PDW BLD-RTO: 14.6 % (ref 11.7–14.9)
PLATELET # BLD: 107 K/CU MM (ref 140–440)
PMV BLD AUTO: 10.7 FL (ref 7.5–11.1)
POTASSIUM SERPL-SCNC: 3.4 MMOL/L (ref 3.5–5.1)
RBC # BLD: 4.98 M/CU MM (ref 4.2–5.4)
SODIUM BLD-SCNC: 143 MMOL/L (ref 135–145)
WBC # BLD: 3.5 K/CU MM (ref 4–10.5)

## 2022-06-01 PROCEDURE — 36415 COLL VENOUS BLD VENIPUNCTURE: CPT

## 2022-06-01 PROCEDURE — 85027 COMPLETE CBC AUTOMATED: CPT

## 2022-06-01 PROCEDURE — 80048 BASIC METABOLIC PNL TOTAL CA: CPT

## 2022-06-02 ENCOUNTER — HOSPITAL ENCOUNTER (OUTPATIENT)
Age: 82
Setting detail: SPECIMEN
Discharge: HOME | End: 2022-06-02
Payer: COMMERCIAL

## 2022-06-02 LAB
BACTERIA: NEGATIVE /HPF
BILIRUBIN URINE: NEGATIVE MG/DL
BLOOD, URINE: NEGATIVE
CALCIUM OXALATE CRYSTALS: ABNORMAL /HPF
CLARITY: CLEAR
COLOR: YELLOW
FOLATE: >20 NG/ML (ref 3.1–17.5)
GLUCOSE, URINE: NEGATIVE MG/DL
KETONES, URINE: NEGATIVE MG/DL
LEUKOCYTE ESTERASE, URINE: NEGATIVE
MAGNESIUM: 1.8 MG/DL (ref 1.8–2.4)
MUCUS: ABNORMAL HPF
NITRITE URINE, QUANTITATIVE: NEGATIVE
PH, URINE: 7 (ref 5–8)
POTASSIUM SERPL-SCNC: 3.9 MMOL/L (ref 3.5–5.1)
PROTEIN UA: NEGATIVE MG/DL
RBC URINE: ABNORMAL /HPF (ref 0–6)
SPECIFIC GRAVITY UA: 1.01 (ref 1–1.03)
TRICHOMONAS: ABNORMAL /HPF
TSH HIGH SENSITIVITY: 16.53 UIU/ML (ref 0.27–4.2)
UROBILINOGEN, URINE: NORMAL MG/DL (ref 0.2–1)
VITAMIN B-12: 866.6 PG/ML (ref 211–911)
WBC UA: ABNORMAL /HPF (ref 0–5)

## 2022-06-02 PROCEDURE — 81001 URINALYSIS AUTO W/SCOPE: CPT

## 2022-06-02 PROCEDURE — 36415 COLL VENOUS BLD VENIPUNCTURE: CPT

## 2022-06-02 PROCEDURE — 82746 ASSAY OF FOLIC ACID SERUM: CPT

## 2022-06-02 PROCEDURE — 84443 ASSAY THYROID STIM HORMONE: CPT

## 2022-06-02 PROCEDURE — 82607 VITAMIN B-12: CPT

## 2022-06-02 PROCEDURE — 84132 ASSAY OF SERUM POTASSIUM: CPT

## 2022-06-02 PROCEDURE — 83735 ASSAY OF MAGNESIUM: CPT

## 2022-06-02 PROCEDURE — 87086 URINE CULTURE/COLONY COUNT: CPT

## 2022-06-03 LAB
CULTURE: NORMAL
Lab: NORMAL
SPECIMEN: NORMAL

## 2022-07-01 ENCOUNTER — HOSPITAL ENCOUNTER (OUTPATIENT)
Age: 82
Setting detail: SPECIMEN
Discharge: HOME | End: 2022-07-01
Payer: COMMERCIAL

## 2022-07-01 LAB — TSH HIGH SENSITIVITY: 3.5 UIU/ML (ref 0.27–4.2)

## 2022-07-01 PROCEDURE — 36415 COLL VENOUS BLD VENIPUNCTURE: CPT

## 2022-07-01 PROCEDURE — 84443 ASSAY THYROID STIM HORMONE: CPT

## 2022-07-05 ENCOUNTER — HOSPITAL ENCOUNTER (OUTPATIENT)
Age: 82
Setting detail: SPECIMEN
Discharge: HOME | End: 2022-07-05
Payer: COMMERCIAL

## 2022-07-05 LAB
ALBUMIN SERPL-MCNC: 3.7 GM/DL (ref 3.4–5)
ALP BLD-CCNC: 54 IU/L (ref 40–129)
ALT SERPL-CCNC: 12 U/L (ref 10–40)
ANION GAP SERPL CALCULATED.3IONS-SCNC: 11 MMOL/L (ref 4–16)
AST SERPL-CCNC: 20 IU/L (ref 15–37)
BILIRUB SERPL-MCNC: 0.3 MG/DL (ref 0–1)
BUN BLDV-MCNC: 14 MG/DL (ref 6–23)
CALCIUM SERPL-MCNC: 9 MG/DL (ref 8.3–10.6)
CHLORIDE BLD-SCNC: 109 MMOL/L (ref 99–110)
CO2: 25 MMOL/L (ref 21–32)
CREAT SERPL-MCNC: 0.5 MG/DL (ref 0.6–1.1)
DOSE AMOUNT: ABNORMAL
DOSE TIME: ABNORMAL
GFR AFRICAN AMERICAN: >60 ML/MIN/1.73M2
GFR NON-AFRICAN AMERICAN: >60 ML/MIN/1.73M2
GLUCOSE BLD-MCNC: 64 MG/DL (ref 70–99)
HCT VFR BLD CALC: 37.6 % (ref 37–47)
HEMOGLOBIN: 11.6 GM/DL (ref 12.5–16)
MCH RBC QN AUTO: 28.3 PG (ref 27–31)
MCHC RBC AUTO-ENTMCNC: 30.9 % (ref 32–36)
MCV RBC AUTO: 91.7 FL (ref 78–100)
PDW BLD-RTO: 14.6 % (ref 11.7–14.9)
PLATELET # BLD: 129 K/CU MM (ref 140–440)
PMV BLD AUTO: 10.2 FL (ref 7.5–11.1)
POTASSIUM SERPL-SCNC: 3.3 MMOL/L (ref 3.5–5.1)
RBC # BLD: 4.1 M/CU MM (ref 4.2–5.4)
SODIUM BLD-SCNC: 145 MMOL/L (ref 135–145)
TOTAL PROTEIN: 5.9 GM/DL (ref 6.4–8.2)
VALPROIC ACID LEVEL: 13.5 UG/ML (ref 50–100)
WBC # BLD: 3 K/CU MM (ref 4–10.5)

## 2022-07-05 PROCEDURE — 36415 COLL VENOUS BLD VENIPUNCTURE: CPT

## 2022-07-05 PROCEDURE — 85027 COMPLETE CBC AUTOMATED: CPT

## 2022-07-05 PROCEDURE — 80053 COMPREHEN METABOLIC PANEL: CPT

## 2022-07-05 PROCEDURE — 80164 ASSAY DIPROPYLACETIC ACD TOT: CPT

## 2022-07-12 ENCOUNTER — HOSPITAL ENCOUNTER (OUTPATIENT)
Age: 82
Setting detail: SPECIMEN
Discharge: HOME | End: 2022-07-12
Payer: COMMERCIAL

## 2022-07-12 LAB
BASOPHILS ABSOLUTE: 0 K/CU MM
BASOPHILS RELATIVE PERCENT: 0.8 % (ref 0–1)
DIFFERENTIAL TYPE: ABNORMAL
EOSINOPHILS ABSOLUTE: 0.1 K/CU MM
EOSINOPHILS RELATIVE PERCENT: 1.8 % (ref 0–3)
HCT VFR BLD CALC: 42.7 % (ref 37–47)
HEMOGLOBIN: 13.7 GM/DL (ref 12.5–16)
IMMATURE NEUTROPHIL %: 0.3 % (ref 0–0.43)
LYMPHOCYTES ABSOLUTE: 1.7 K/CU MM
LYMPHOCYTES RELATIVE PERCENT: 43.2 % (ref 24–44)
MAGNESIUM: 1.9 MG/DL (ref 1.8–2.4)
MCH RBC QN AUTO: 29.3 PG (ref 27–31)
MCHC RBC AUTO-ENTMCNC: 32.1 % (ref 32–36)
MCV RBC AUTO: 91.2 FL (ref 78–100)
MONOCYTES ABSOLUTE: 0.6 K/CU MM
MONOCYTES RELATIVE PERCENT: 14.3 % (ref 0–4)
NUCLEATED RBC %: 0 %
PDW BLD-RTO: 14.5 % (ref 11.7–14.9)
PLATELET # BLD: 153 K/CU MM (ref 140–440)
PMV BLD AUTO: 10 FL (ref 7.5–11.1)
POTASSIUM SERPL-SCNC: 3.7 MMOL/L (ref 3.5–5.1)
RBC # BLD: 4.68 M/CU MM (ref 4.2–5.4)
SEGMENTED NEUTROPHILS ABSOLUTE COUNT: 1.5 K/CU MM
SEGMENTED NEUTROPHILS RELATIVE PERCENT: 39.6 % (ref 36–66)
TOTAL IMMATURE NEUTOROPHIL: 0.01 K/CU MM
TOTAL NUCLEATED RBC: 0 K/CU MM
WBC # BLD: 3.8 K/CU MM (ref 4–10.5)

## 2022-07-12 PROCEDURE — 36415 COLL VENOUS BLD VENIPUNCTURE: CPT

## 2022-07-12 PROCEDURE — 85025 COMPLETE CBC W/AUTO DIFF WBC: CPT

## 2022-07-12 PROCEDURE — 84132 ASSAY OF SERUM POTASSIUM: CPT

## 2022-07-12 PROCEDURE — 83735 ASSAY OF MAGNESIUM: CPT

## 2022-09-02 ENCOUNTER — HOSPITAL ENCOUNTER (EMERGENCY)
Age: 82
Discharge: HOME | End: 2022-09-02
Payer: COMMERCIAL

## 2022-09-02 VITALS
BODY MASS INDEX: 16.83 KG/M2 | OXYGEN SATURATION: 99 % | HEART RATE: 71 BPM | DIASTOLIC BLOOD PRESSURE: 62 MMHG | RESPIRATION RATE: 16 BRPM | SYSTOLIC BLOOD PRESSURE: 135 MMHG | TEMPERATURE: 97.8 F | WEIGHT: 95 LBS

## 2022-09-02 DIAGNOSIS — F03.91 DEMENTIA WITH BEHAVIORAL DISTURBANCE, UNSPECIFIED DEMENTIA TYPE: Primary | ICD-10-CM

## 2022-09-02 LAB
BACTERIA: NEGATIVE /HPF
BILIRUBIN URINE: NEGATIVE MG/DL
BLOOD, URINE: NEGATIVE
CLARITY: CLEAR
COLOR: YELLOW
GLUCOSE BLD-MCNC: 128 MG/DL (ref 70–99)
GLUCOSE, URINE: NEGATIVE MG/DL
HYALINE CASTS: 0 /LPF
KETONES, URINE: 15 MG/DL
LEUKOCYTE ESTERASE, URINE: ABNORMAL
MUCUS: ABNORMAL HPF
NITRITE URINE, QUANTITATIVE: NEGATIVE
PH, URINE: 6.5 (ref 5–8)
PROTEIN UA: NEGATIVE MG/DL
RBC URINE: ABNORMAL /HPF (ref 0–6)
SPECIFIC GRAVITY UA: 1.01 (ref 1–1.03)
SQUAMOUS EPITHELIAL: <1 /HPF
TRICHOMONAS: ABNORMAL /HPF
UROBILINOGEN, URINE: NORMAL MG/DL (ref 0.2–1)
WBC UA: 1 /HPF (ref 0–5)

## 2022-09-02 PROCEDURE — 87086 URINE CULTURE/COLONY COUNT: CPT

## 2022-09-02 PROCEDURE — 99283 EMERGENCY DEPT VISIT LOW MDM: CPT

## 2022-09-02 PROCEDURE — 82962 GLUCOSE BLOOD TEST: CPT

## 2022-09-02 PROCEDURE — 81001 URINALYSIS AUTO W/SCOPE: CPT

## 2022-09-03 NOTE — ACP (ADVANCE CARE PLANNING)
Patient does not have any ACP documents/Medical Power of . LSW notes hospital will follow [place] Next of Kin hierarchy in the following descending order for priority:     Guardian  Spouse  [de-identified] of adult Children  Parents  [de-identified] of adult Siblings  Nearest Relative not described above     Per [place] Next of Kin hierarchy: Patients' child will be [address].
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
## 2022-09-03 NOTE — ED NOTES
1:1 sitter with patient due to dementia and attempting to get out of bed.         Vish Carrier, RN  09/02/22 9704
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
## 2022-09-04 LAB
CULTURE: NORMAL
Lab: NORMAL
SPECIMEN: NORMAL

## 2022-09-06 ENCOUNTER — HOSPITAL ENCOUNTER (OUTPATIENT)
Age: 82
Setting detail: SPECIMEN
Discharge: HOME | End: 2022-09-06
Payer: COMMERCIAL

## 2022-09-06 LAB
ANION GAP SERPL CALCULATED.3IONS-SCNC: 10 MMOL/L (ref 4–16)
BASOPHILS ABSOLUTE: 0 K/CU MM
BASOPHILS RELATIVE PERCENT: 0.3 % (ref 0–1)
BUN BLDV-MCNC: 20 MG/DL (ref 6–23)
CALCIUM SERPL-MCNC: 9 MG/DL (ref 8.3–10.6)
CHLORIDE BLD-SCNC: 108 MMOL/L (ref 99–110)
CO2: 26 MMOL/L (ref 21–32)
CREAT SERPL-MCNC: 0.8 MG/DL (ref 0.6–1.1)
DIFFERENTIAL TYPE: ABNORMAL
EOSINOPHILS ABSOLUTE: 0.1 K/CU MM
EOSINOPHILS RELATIVE PERCENT: 1 % (ref 0–3)
GFR AFRICAN AMERICAN: >60 ML/MIN/1.73M2
GFR NON-AFRICAN AMERICAN: >60 ML/MIN/1.73M2
GLUCOSE BLD-MCNC: 74 MG/DL (ref 70–99)
HCT VFR BLD CALC: 34.9 % (ref 37–47)
HEMOGLOBIN: 10.8 GM/DL (ref 12.5–16)
IMMATURE NEUTROPHIL %: 0.1 % (ref 0–0.43)
LYMPHOCYTES ABSOLUTE: 1.1 K/CU MM
LYMPHOCYTES RELATIVE PERCENT: 16 % (ref 24–44)
MCH RBC QN AUTO: 28.3 PG (ref 27–31)
MCHC RBC AUTO-ENTMCNC: 30.9 % (ref 32–36)
MCV RBC AUTO: 91.6 FL (ref 78–100)
MONOCYTES ABSOLUTE: 1 K/CU MM
MONOCYTES RELATIVE PERCENT: 15.1 % (ref 0–4)
NUCLEATED RBC %: 0 %
PDW BLD-RTO: 14 % (ref 11.7–14.9)
PLATELET # BLD: 155 K/CU MM (ref 140–440)
PMV BLD AUTO: 10.5 FL (ref 7.5–11.1)
POTASSIUM SERPL-SCNC: 3.4 MMOL/L (ref 3.5–5.1)
RBC # BLD: 3.81 M/CU MM (ref 4.2–5.4)
SEGMENTED NEUTROPHILS ABSOLUTE COUNT: 4.6 K/CU MM
SEGMENTED NEUTROPHILS RELATIVE PERCENT: 67.5 % (ref 36–66)
SODIUM BLD-SCNC: 144 MMOL/L (ref 135–145)
TOTAL IMMATURE NEUTOROPHIL: 0.01 K/CU MM
TOTAL NUCLEATED RBC: 0 K/CU MM
WBC # BLD: 6.8 K/CU MM (ref 4–10.5)

## 2022-09-06 PROCEDURE — 36415 COLL VENOUS BLD VENIPUNCTURE: CPT

## 2022-09-06 PROCEDURE — 85025 COMPLETE CBC W/AUTO DIFF WBC: CPT

## 2022-09-06 PROCEDURE — 80048 BASIC METABOLIC PNL TOTAL CA: CPT

## 2022-09-16 ENCOUNTER — APPOINTMENT (OUTPATIENT)
Dept: CT IMAGING | Age: 82
DRG: 884 | End: 2022-09-16
Payer: COMMERCIAL

## 2022-09-16 ENCOUNTER — APPOINTMENT (OUTPATIENT)
Dept: GENERAL RADIOLOGY | Age: 82
DRG: 884 | End: 2022-09-16
Payer: COMMERCIAL

## 2022-09-16 ENCOUNTER — HOSPITAL ENCOUNTER (INPATIENT)
Age: 82
LOS: 3 days | Discharge: HOME | DRG: 884 | End: 2022-09-19
Attending: EMERGENCY MEDICINE | Admitting: HOSPITALIST
Payer: COMMERCIAL

## 2022-09-16 DIAGNOSIS — F03.91 DEMENTIA WITH BEHAVIORAL DISTURBANCE, UNSPECIFIED DEMENTIA TYPE: ICD-10-CM

## 2022-09-16 DIAGNOSIS — S09.90XA CLOSED HEAD INJURY, INITIAL ENCOUNTER: ICD-10-CM
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
## 2022-09-16 DIAGNOSIS — R77.8 ELEVATED TROPONIN: ICD-10-CM

## 2022-09-16 DIAGNOSIS — R41.82 ALTERED MENTAL STATUS, UNSPECIFIED ALTERED MENTAL STATUS TYPE: Primary | ICD-10-CM

## 2022-09-16 DIAGNOSIS — N39.0 LOWER URINARY TRACT INFECTION: ICD-10-CM

## 2022-09-16 DIAGNOSIS — E43 SEVERE MALNUTRITION (HCC): Chronic | ICD-10-CM

## 2022-09-16 LAB
ALBUMIN SERPL-MCNC: 4.1 GM/DL (ref 3.4–5)
ALP BLD-CCNC: 135 IU/L (ref 40–128)
ALT SERPL-CCNC: 23 U/L (ref 10–40)
ANION GAP SERPL CALCULATED.3IONS-SCNC: 14 MMOL/L (ref 4–16)
AST SERPL-CCNC: 38 IU/L (ref 15–37)
BACTERIA: ABNORMAL /HPF
BASOPHILS ABSOLUTE: 0 K/CU MM
BASOPHILS RELATIVE PERCENT: 0.2 % (ref 0–1)
BILIRUB SERPL-MCNC: 0.4 MG/DL (ref 0–1)
BILIRUBIN URINE: NEGATIVE MG/DL
BLOOD, URINE: ABNORMAL
BUN BLDV-MCNC: 29 MG/DL (ref 6–23)
CALCIUM SERPL-MCNC: 9.4 MG/DL (ref 8.3–10.6)
CHLORIDE BLD-SCNC: 104 MMOL/L (ref 99–110)
CLARITY: CLEAR
CO2: 25 MMOL/L (ref 21–32)
COLOR: YELLOW
CREAT SERPL-MCNC: 0.7 MG/DL (ref 0.6–1.1)
DIFFERENTIAL TYPE: ABNORMAL
EKG ATRIAL RATE: 92 BPM
EKG DIAGNOSIS: NORMAL
EKG P AXIS: 59 DEGREES
EKG P-R INTERVAL: 124 MS
EKG Q-T INTERVAL: 368 MS
EKG QRS DURATION: 74 MS
EKG QTC CALCULATION (BAZETT): 455 MS
EKG R AXIS: 50 DEGREES
EKG T AXIS: 69 DEGREES
EKG VENTRICULAR RATE: 92 BPM
EOSINOPHILS ABSOLUTE: 0 K/CU MM
EOSINOPHILS RELATIVE PERCENT: 0.4 % (ref 0–3)
GFR AFRICAN AMERICAN: >60 ML/MIN/1.73M2
GFR NON-AFRICAN AMERICAN: >60 ML/MIN/1.73M2
GLUCOSE BLD-MCNC: 103 MG/DL (ref 70–99)
GLUCOSE, URINE: NEGATIVE MG/DL
HCT VFR BLD CALC: 40.5 % (ref 37–47)
HEMOGLOBIN: 12.8 GM/DL (ref 12.5–16)
IMMATURE NEUTROPHIL %: 0.6 % (ref 0–0.43)
KETONES, URINE: 40 MG/DL
LEUKOCYTE ESTERASE, URINE: ABNORMAL
LYMPHOCYTES ABSOLUTE: 0.9 K/CU MM
LYMPHOCYTES RELATIVE PERCENT: 9.8 % (ref 24–44)
MCH RBC QN AUTO: 28.4 PG (ref 27–31)
MCHC RBC AUTO-ENTMCNC: 31.6 % (ref 32–36)
MCV RBC AUTO: 89.8 FL (ref 78–100)
MONOCYTES ABSOLUTE: 1 K/CU MM
MONOCYTES RELATIVE PERCENT: 10.6 % (ref 0–4)
NITRITE URINE, QUANTITATIVE: POSITIVE
NUCLEATED RBC %: 0 %
PDW BLD-RTO: 13.9 % (ref 11.7–14.9)
PH, URINE: 6 (ref 5–8)
PLATELET # BLD: 217 K/CU MM (ref 140–440)
PMV BLD AUTO: 9 FL (ref 7.5–11.1)
POTASSIUM SERPL-SCNC: 3.5 MMOL/L (ref 3.5–5.1)
PROTEIN UA: 100 MG/DL
RBC # BLD: 4.51 M/CU MM (ref 4.2–5.4)
RBC URINE: 28 /HPF (ref 0–6)
SEGMENTED NEUTROPHILS ABSOLUTE COUNT: 7.1 K/CU MM
SEGMENTED NEUTROPHILS RELATIVE PERCENT: 78.4 % (ref 36–66)
SODIUM BLD-SCNC: 143 MMOL/L (ref 135–145)
SPECIFIC GRAVITY UA: 1.02 (ref 1–1.03)
TOTAL IMMATURE NEUTOROPHIL: 0.05 K/CU MM
TOTAL NUCLEATED RBC: 0 K/CU MM
TOTAL PROTEIN: 7.4 GM/DL (ref 6.4–8.2)
TRICHOMONAS: ABNORMAL /HPF
TROPONIN T: 0.01 NG/ML
UROBILINOGEN, URINE: 1 MG/DL (ref 0.2–1)
WBC # BLD: 9.1 K/CU MM (ref 4–10.5)
WBC CLUMP: ABNORMAL /HPF
WBC UA: 2441 /HPF (ref 0–5)

## 2022-09-16 PROCEDURE — 6370000000 HC RX 637 (ALT 250 FOR IP): Performed by: HOSPITALIST

## 2022-09-16 PROCEDURE — 72125 CT NECK SPINE W/O DYE: CPT

## 2022-09-16 PROCEDURE — 81001 URINALYSIS AUTO W/SCOPE: CPT

## 2022-09-16 PROCEDURE — 93010 ELECTROCARDIOGRAM REPORT: CPT | Performed by: INTERNAL MEDICINE

## 2022-09-16 PROCEDURE — 84484 ASSAY OF TROPONIN QUANT: CPT

## 2022-09-16 PROCEDURE — 71045 X-RAY EXAM CHEST 1 VIEW: CPT

## 2022-09-16 PROCEDURE — 1200000000 HC SEMI PRIVATE

## 2022-09-16 PROCEDURE — 87077 CULTURE AEROBIC IDENTIFY: CPT

## 2022-09-16 PROCEDURE — 70450 CT HEAD/BRAIN W/O DYE: CPT

## 2022-09-16 PROCEDURE — 96374 THER/PROPH/DIAG INJ IV PUSH: CPT

## 2022-09-16 PROCEDURE — 2580000003 HC RX 258: Performed by: HOSPITALIST

## 2022-09-16 PROCEDURE — 99285 EMERGENCY DEPT VISIT HI MDM: CPT

## 2022-09-16 PROCEDURE — 80053 COMPREHEN METABOLIC PANEL: CPT

## 2022-09-16 PROCEDURE — 85025 COMPLETE CBC W/AUTO DIFF WBC: CPT

## 2022-09-16 PROCEDURE — 93005 ELECTROCARDIOGRAM TRACING: CPT | Performed by: PHYSICIAN ASSISTANT

## 2022-09-16 PROCEDURE — 2580000003 HC RX 258: Performed by: PHYSICIAN ASSISTANT

## 2022-09-16 PROCEDURE — 6360000002 HC RX W HCPCS: Performed by: HOSPITALIST

## 2022-09-16 PROCEDURE — 6360000002 HC RX W HCPCS: Performed by: PHYSICIAN ASSISTANT

## 2022-09-16 PROCEDURE — 87186 SC STD MICRODIL/AGAR DIL: CPT

## 2022-09-16 PROCEDURE — 96375 TX/PRO/DX INJ NEW DRUG ADDON: CPT

## 2022-09-16 PROCEDURE — 87086 URINE CULTURE/COLONY COUNT: CPT

## 2022-09-16 RX ORDER — POTASSIUM CHLORIDE 7.45 MG/ML
10 INJECTION INTRAVENOUS PRN
Status: DISCONTINUED | OUTPATIENT
Start: 2022-09-16 | End: 2022-09-17

## 2022-09-16 RX ORDER — SODIUM CHLORIDE 0.9 % (FLUSH) 0.9 %
5-40 SYRINGE (ML) INJECTION PRN
Status: DISCONTINUED | OUTPATIENT
Start: 2022-09-16 | End: 2022-09-19 | Stop reason: HOSPADM

## 2022-09-16 RX ORDER — MAGNESIUM SULFATE IN WATER 40 MG/ML
2000 INJECTION, SOLUTION INTRAVENOUS PRN
Status: DISCONTINUED | OUTPATIENT
Start: 2022-09-16 | End: 2022-09-17

## 2022-09-16 RX ORDER — PANTOPRAZOLE SODIUM 40 MG/1
40 TABLET, DELAYED RELEASE ORAL
Status: DISCONTINUED | OUTPATIENT
Start: 2022-09-17 | End: 2022-09-19 | Stop reason: HOSPADM

## 2022-09-16 RX ORDER — DOXEPIN HYDROCHLORIDE 10 MG/1
10 CAPSULE ORAL NIGHTLY
Status: DISCONTINUED | OUTPATIENT
Start: 2022-09-16 | End: 2022-09-19 | Stop reason: HOSPADM

## 2022-09-16 RX ORDER — POLYETHYLENE GLYCOL 3350 17 G/17G
17 POWDER, FOR SOLUTION ORAL DAILY PRN
Status: DISCONTINUED | OUTPATIENT
Start: 2022-09-16 | End: 2022-09-19 | Stop reason: HOSPADM

## 2022-09-16 RX ORDER — OYSTER SHELL CALCIUM WITH VITAMIN D 500; 200 MG/1; [IU]/1
1 TABLET, FILM COATED ORAL DAILY
Status: DISCONTINUED | OUTPATIENT
Start: 2022-09-16 | End: 2022-09-16 | Stop reason: CLARIF

## 2022-09-16 RX ORDER — FENTANYL CITRATE 50 UG/ML
25 INJECTION, SOLUTION INTRAMUSCULAR; INTRAVENOUS ONCE
Status: COMPLETED | OUTPATIENT
Start: 2022-09-16 | End: 2022-09-16

## 2022-09-16 RX ORDER — SODIUM CHLORIDE 9 MG/ML
INJECTION, SOLUTION INTRAVENOUS PRN
Status: DISCONTINUED | OUTPATIENT
Start: 2022-09-16 | End: 2022-09-19 | Stop reason: HOSPADM

## 2022-09-16 RX ORDER — BUSPIRONE HYDROCHLORIDE 5 MG/1
5 TABLET ORAL 3 TIMES DAILY
Status: DISCONTINUED | OUTPATIENT
Start: 2022-09-16 | End: 2022-09-19 | Stop reason: HOSPADM

## 2022-09-16 RX ORDER — SODIUM CHLORIDE 9 MG/ML
INJECTION, SOLUTION INTRAVENOUS CONTINUOUS
Status: DISCONTINUED | OUTPATIENT
Start: 2022-09-16 | End: 2022-09-17

## 2022-09-16 RX ORDER — SODIUM CHLORIDE 0.9 % (FLUSH) 0.9 %
5-40 SYRINGE (ML) INJECTION EVERY 12 HOURS SCHEDULED
Status: DISCONTINUED | OUTPATIENT
Start: 2022-09-16 | End: 2022-09-19 | Stop reason: HOSPADM

## 2022-09-16 RX ORDER — ENOXAPARIN SODIUM 100 MG/ML
30 INJECTION SUBCUTANEOUS DAILY
Status: DISCONTINUED | OUTPATIENT
Start: 2022-09-17 | End: 2022-09-19 | Stop reason: HOSPADM

## 2022-09-16 RX ORDER — EZETIMIBE 10 MG/1
10 TABLET ORAL DAILY
Status: DISCONTINUED | OUTPATIENT
Start: 2022-09-16 | End: 2022-09-19 | Stop reason: HOSPADM

## 2022-09-16 RX ORDER — ASPIRIN 81 MG/1
324 TABLET, CHEWABLE ORAL ONCE
Status: DISCONTINUED | OUTPATIENT
Start: 2022-09-16 | End: 2022-09-16 | Stop reason: SDUPTHER

## 2022-09-16 RX ORDER — ACETAMINOPHEN 325 MG/1
650 TABLET ORAL EVERY 6 HOURS PRN
Status: DISCONTINUED | OUTPATIENT
Start: 2022-09-16 | End: 2022-09-19 | Stop reason: HOSPADM

## 2022-09-16 RX ORDER — DIVALPROEX SODIUM 125 MG/1
500 CAPSULE, COATED PELLETS ORAL NIGHTLY
Status: DISCONTINUED | OUTPATIENT
Start: 2022-09-16 | End: 2022-09-19 | Stop reason: HOSPADM

## 2022-09-16 RX ORDER — DIVALPROEX SODIUM 125 MG/1
250 CAPSULE, COATED PELLETS ORAL DAILY
Status: DISCONTINUED | OUTPATIENT
Start: 2022-09-17 | End: 2022-09-19 | Stop reason: HOSPADM

## 2022-09-16 RX ORDER — 0.9 % SODIUM CHLORIDE 0.9 %
1000 INTRAVENOUS SOLUTION INTRAVENOUS ONCE
Status: COMPLETED | OUTPATIENT
Start: 2022-09-16 | End: 2022-09-16

## 2022-09-16 RX ORDER — ASPIRIN 81 MG/1
81 TABLET, CHEWABLE ORAL DAILY
Status: DISCONTINUED | OUTPATIENT
Start: 2022-09-16 | End: 2022-09-19 | Stop reason: HOSPADM

## 2022-09-16 RX ORDER — DIAZEPAM 5 MG/ML
10 INJECTION, SOLUTION INTRAMUSCULAR; INTRAVENOUS ONCE
Status: COMPLETED | OUTPATIENT
Start: 2022-09-16 | End: 2022-09-16

## 2022-09-16 RX ORDER — MEMANTINE HYDROCHLORIDE 5 MG/1
5 TABLET ORAL DAILY
Status: DISCONTINUED | OUTPATIENT
Start: 2022-09-16 | End: 2022-09-19 | Stop reason: HOSPADM

## 2022-09-16 RX ORDER — ENOXAPARIN SODIUM 100 MG/ML
40 INJECTION SUBCUTANEOUS DAILY
Status: DISCONTINUED | OUTPATIENT
Start: 2022-09-16 | End: 2022-09-16

## 2022-09-16 RX ORDER — ACETAMINOPHEN 650 MG/1
650 SUPPOSITORY RECTAL EVERY 6 HOURS PRN
Status: DISCONTINUED | OUTPATIENT
Start: 2022-09-16 | End: 2022-09-19 | Stop reason: HOSPADM

## 2022-09-16 RX ORDER — ONDANSETRON 4 MG/1
4 TABLET, ORALLY DISINTEGRATING ORAL EVERY 8 HOURS PRN
Status: DISCONTINUED | OUTPATIENT
Start: 2022-09-16 | End: 2022-09-19 | Stop reason: HOSPADM

## 2022-09-16 RX ORDER — ONDANSETRON 2 MG/ML
4 INJECTION INTRAMUSCULAR; INTRAVENOUS EVERY 6 HOURS PRN
Status: DISCONTINUED | OUTPATIENT
Start: 2022-09-16 | End: 2022-09-19 | Stop reason: HOSPADM

## 2022-09-16 RX ORDER — POTASSIUM CHLORIDE 20 MEQ/1
40 TABLET, EXTENDED RELEASE ORAL PRN
Status: DISCONTINUED | OUTPATIENT
Start: 2022-09-16 | End: 2022-09-17

## 2022-09-16 RX ADMIN — ASPIRIN 81 MG CHEWABLE TABLET 81 MG: 81 TABLET CHEWABLE at 22:12

## 2022-09-16 RX ADMIN — DOXEPIN HYDROCHLORIDE 10 MG: 10 CAPSULE ORAL at 22:33

## 2022-09-16 RX ADMIN — BUSPIRONE HYDROCHLORIDE 5 MG: 5 TABLET ORAL at 22:12

## 2022-09-16 RX ADMIN — DIVALPROEX SODIUM 500 MG: 125 CAPSULE, COATED PELLETS ORAL at 22:13

## 2022-09-16 RX ADMIN — DIAZEPAM 10 MG: 5 INJECTION, SOLUTION INTRAMUSCULAR; INTRAVENOUS at 17:59

## 2022-09-16 RX ADMIN — FENTANYL CITRATE 25 MCG: 50 INJECTION, SOLUTION INTRAMUSCULAR; INTRAVENOUS at 16:50

## 2022-09-16 RX ADMIN — SODIUM CHLORIDE, PRESERVATIVE FREE 10 ML: 5 INJECTION INTRAVENOUS at 22:33

## 2022-09-16 RX ADMIN — MEMANTINE HYDROCHLORIDE 5 MG: 5 TABLET ORAL at 22:33

## 2022-09-16 RX ADMIN — SODIUM CHLORIDE 1000 ML: 9 INJECTION, SOLUTION INTRAVENOUS at 16:49

## 2022-09-16 RX ADMIN — EZETIMIBE 10 MG: 10 TABLET ORAL at 22:12

## 2022-09-16 RX ADMIN — CEFTRIAXONE 1000 MG: 1 INJECTION, POWDER, FOR SOLUTION INTRAMUSCULAR; INTRAVENOUS at 22:26

## 2022-09-16 RX ADMIN — SODIUM CHLORIDE: 9 INJECTION, SOLUTION INTRAVENOUS at 22:22

## 2022-09-16 NOTE — ED PROVIDER NOTES
EKG normal sinus rhythm, ventricular rate of 92, WA interval 124, QRS duration 74, QT/QTc 368/455, no ST elevation noted. Rosy Lewis, DO  09/16/22 1507      I independently examined and evaluated Luigi Gamboa. In brief, patient presents to the emergency department from the nursing home for increased altered mental status fall. Patient history of dementia with behavior disturbance. Patient coming from the [address] Focused exam revealed awake no acute distress pupils equal round reactive to light, mucous membranes moist, lungs are clear, abdomen soft mouth tenderness to palpation. ED course: Patient was initially seen laboratory studies were ordered was given some fentanyl and IV fluids and Valium for agitation to fall scalp hematoma. Laboratory studies with normal white blood cell count hemoglobin platelets, normal sodium potassium kidney function liver enzymes calcium. Patient glucose of 103. Patient elevated troponin 0.012. EKG showing normal sinus rhythm, ventricular rate of 92, WA interval 124, QRS duration 74, QT/QTc 368/455, no ST elevation noted. Patient denied any chest pain. CT of the head showed no acute intracranial abnormality generalized atrophy chronic small vessel ischemic white matter disease, CT of the cervical spine no acute fracture or traumatic malalignment of the cervical spine. I did order patient aspirin for the elevated troponin. Urinalysis was ordered and came back with 2441 white blood cells many bacteria large leukocytes positive nitrite. He will be sent for urine culture. Patient ordered Rocephin 1 g IV. This may be causing patient increase altered mental status. Patient will need admission to hospitalist services. Hospitalist consulted for admission.     All diagnostic, treatment, and disposition decisions were made by myself in conjunction with the advanced practice provider    BP (!) 121/48   Pulse 88   Temp 97.2 °F (36.2 °C) (Axillary)   Resp 17   SpO2 99%     Labs Reviewed   CBC WITH AUTO DIFFERENTIAL - Abnormal; Notable for the following components:       Result Value    MCHC 31.6 (*)     Segs Relative 78.4 (*)     Lymphocytes % 9.8 (*)     Monocytes % 10.6 (*)     Immature Neutrophil % 0.6 (*)     All other components within normal limits   COMPREHENSIVE METABOLIC PANEL - Abnormal; Notable for the following components:    BUN 29 (*)     Glucose 103 (*)     AST 38 (*)     Alkaline Phosphatase 135 (*)     All other components within normal limits   TROPONIN - Abnormal; Notable for the following components:    Troponin T 0.012 (*)     All other components within normal limits   URINALYSIS WITH MICROSCOPIC - Abnormal; Notable for the following components:    Ketones, Urine 40 (*)     Blood, Urine MODERATE (*)     Protein,  (*)     Nitrite Urine, Quantitative POSITIVE (*)     Leukocyte Esterase, Urine LARGE (*)     RBC, UA 28 (*)     WBC, UA 2441 (*)     Bacteria, UA MANY (*)     All other components within normal limits     XR CHEST PORTABLE   Final Result   1. No acute cardiopulmonary process identified. CT HEAD WO CONTRAST   Final Result   No acute intracranial abnormality. Generalized atrophy and chronic small vessel ischemic white matter disease. 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 CT CERVICAL SPINE WO CONTRAST   Final Result   No acute fracture or traumatic malalignment of the cervical spine. ICD-10-CM    1. Altered mental status, unspecified altered mental status type  R41.82       2. Lower urinary tract infection  N39.0       3. Elevated troponin  R77.8       4. Dementia with behavioral disturbance, unspecified dementia type ([facility]  F03.91       5. Fall, initial encounter  [address] XXXA       6.  Closed head injury, initial encounter  S09.90XA                I personally saw the patient and performed a substantive portion of the visit including all aspects of the

## 2022-09-16 NOTE — ED NOTES
Pt repeatedly attempting to get out of bed and walk away. Due to recent fall and hx dementia, MSW, safety sitter, and RN Keyur Guerrier intercepted and got pt back to bed. Pt needed lots of redirecting and is now laying in bed calmly.  MSW monitoring closely at nurses station

## 2022-09-17 LAB
ALBUMIN SERPL-MCNC: 4.1 GM/DL (ref 3.4–5)
ALP BLD-CCNC: 128 IU/L (ref 40–129)
ALT SERPL-CCNC: 22 U/L (ref 10–40)
ANION GAP SERPL CALCULATED.3IONS-SCNC: 11 MMOL/L (ref 4–16)
AST SERPL-CCNC: 34 IU/L (ref 15–37)
BASOPHILS ABSOLUTE: 0 K/CU MM
BASOPHILS RELATIVE PERCENT: 0.4 % (ref 0–1)
BILIRUB SERPL-MCNC: 0.3 MG/DL (ref 0–1)
BUN BLDV-MCNC: 15 MG/DL (ref 6–23)
CALCIUM SERPL-MCNC: 8.6 MG/DL (ref 8.3–10.6)
CHLORIDE BLD-SCNC: 106 MMOL/L (ref 99–110)
CO2: 26 MMOL/L (ref 21–32)
CREAT SERPL-MCNC: 0.5 MG/DL (ref 0.6–1.1)
DIFFERENTIAL TYPE: ABNORMAL
EOSINOPHILS ABSOLUTE: 0.1 K/CU MM
EOSINOPHILS RELATIVE PERCENT: 0.6 % (ref 0–3)
GFR AFRICAN AMERICAN: >60 ML/MIN/1.73M2
GFR NON-AFRICAN AMERICAN: >60 ML/MIN/1.73M2
GLUCOSE BLD-MCNC: 86 MG/DL (ref 70–99)
HCT VFR BLD CALC: 39.7 % (ref 37–47)
HEMOGLOBIN: 12.5 GM/DL (ref 12.5–16)
IMMATURE NEUTROPHIL %: 0.4 % (ref 0–0.43)
LYMPHOCYTES ABSOLUTE: 0.9 K/CU MM
LYMPHOCYTES RELATIVE PERCENT: 9.1 % (ref 24–44)
MAGNESIUM: 1.7 MG/DL (ref 1.8–2.4)
MCH RBC QN AUTO: 28.3 PG (ref 27–31)
MCHC RBC AUTO-ENTMCNC: 31.5 % (ref 32–36)
MCV RBC AUTO: 89.8 FL (ref 78–100)
MONOCYTES ABSOLUTE: 1 K/CU MM
MONOCYTES RELATIVE PERCENT: 10 % (ref 0–4)
NUCLEATED RBC %: 0 %
PDW BLD-RTO: 13.5 % (ref 11.7–14.9)
PLATELET # BLD: 162 K/CU MM (ref 140–440)
PMV BLD AUTO: 8.8 FL (ref 7.5–11.1)
POTASSIUM SERPL-SCNC: 3.1 MMOL/L (ref 3.5–5.1)
RBC # BLD: 4.42 M/CU MM (ref 4.2–5.4)
SEGMENTED NEUTROPHILS ABSOLUTE COUNT: 7.7 K/CU MM
SEGMENTED NEUTROPHILS RELATIVE PERCENT: 79.5 % (ref 36–66)
SODIUM BLD-SCNC: 143 MMOL/L (ref 135–145)
TOTAL IMMATURE NEUTOROPHIL: 0.04 K/CU MM
TOTAL NUCLEATED RBC: 0 K/CU MM
TOTAL PROTEIN: 6.6 GM/DL (ref 6.4–8.2)
TROPONIN T: <0.01 NG/ML
WBC # BLD: 9.7 K/CU MM (ref 4–10.5)

## 2022-09-17 PROCEDURE — 97163 PT EVAL HIGH COMPLEX 45 MIN: CPT

## 2022-09-17 PROCEDURE — 36415 COLL VENOUS BLD VENIPUNCTURE: CPT

## 2022-09-17 PROCEDURE — 83735 ASSAY OF MAGNESIUM: CPT

## 2022-09-17 PROCEDURE — 84484 ASSAY OF TROPONIN QUANT: CPT

## 2022-09-17 PROCEDURE — 80053 COMPREHEN METABOLIC PANEL: CPT

## 2022-09-17 PROCEDURE — 6360000002 HC RX W HCPCS: Performed by: HOSPITALIST

## 2022-09-17 PROCEDURE — 1200000000 HC SEMI PRIVATE

## 2022-09-17 PROCEDURE — 80048 BASIC METABOLIC PNL TOTAL CA: CPT

## 2022-09-17 PROCEDURE — 6370000000 HC RX 637 (ALT 250 FOR IP): Performed by: HOSPITALIST

## 2022-09-17 PROCEDURE — 97116 GAIT TRAINING THERAPY: CPT

## 2022-09-17 PROCEDURE — 85025 COMPLETE CBC W/AUTO DIFF WBC: CPT

## 2022-09-17 PROCEDURE — 2580000003 HC RX 258: Performed by: HOSPITALIST

## 2022-09-17 PROCEDURE — 6370000000 HC RX 637 (ALT 250 FOR IP): Performed by: STUDENT IN AN ORGANIZED HEALTH CARE EDUCATION/TRAINING PROGRAM

## 2022-09-17 PROCEDURE — 6360000002 HC RX W HCPCS: Performed by: STUDENT IN AN ORGANIZED HEALTH CARE EDUCATION/TRAINING PROGRAM

## 2022-09-17 PROCEDURE — 94761 N-INVAS EAR/PLS OXIMETRY MLT: CPT

## 2022-09-17 PROCEDURE — 97166 OT EVAL MOD COMPLEX 45 MIN: CPT

## 2022-09-17 RX ORDER — MAGNESIUM SULFATE IN WATER 40 MG/ML
2000 INJECTION, SOLUTION INTRAVENOUS ONCE
Status: COMPLETED | OUTPATIENT
Start: 2022-09-17 | End: 2022-09-18

## 2022-09-17 RX ORDER — ZOLPIDEM TARTRATE 5 MG/1
5 TABLET ORAL ONCE
Status: COMPLETED | OUTPATIENT
Start: 2022-09-17 | End: 2022-09-17

## 2022-09-17 RX ORDER — POTASSIUM CHLORIDE 20 MEQ/1
40 TABLET, EXTENDED RELEASE ORAL ONCE
Status: COMPLETED | OUTPATIENT
Start: 2022-09-17 | End: 2022-09-17

## 2022-09-17 RX ADMIN — DIVALPROEX SODIUM 500 MG: 125 CAPSULE, COATED PELLETS ORAL at 21:55

## 2022-09-17 RX ADMIN — DOXEPIN HYDROCHLORIDE 10 MG: 10 CAPSULE ORAL at 22:10

## 2022-09-17 RX ADMIN — EZETIMIBE 10 MG: 10 TABLET ORAL at 09:37

## 2022-09-17 RX ADMIN — BUSPIRONE HYDROCHLORIDE 5 MG: 5 TABLET ORAL at 14:41

## 2022-09-17 RX ADMIN — BUSPIRONE HYDROCHLORIDE 5 MG: 5 TABLET ORAL at 09:37

## 2022-09-17 RX ADMIN — ASPIRIN 81 MG CHEWABLE TABLET 81 MG: 81 TABLET CHEWABLE at 09:37

## 2022-09-17 RX ADMIN — MAGNESIUM SULFATE HEPTAHYDRATE 2000 MG: 2 INJECTION, SOLUTION INTRAVENOUS at 22:03

## 2022-09-17 RX ADMIN — POTASSIUM CHLORIDE 40 MEQ: 1500 TABLET, EXTENDED RELEASE ORAL at 21:55

## 2022-09-17 RX ADMIN — BUSPIRONE HYDROCHLORIDE 5 MG: 5 TABLET ORAL at 21:55

## 2022-09-17 RX ADMIN — ZOLPIDEM TARTRATE 5 MG: 5 TABLET ORAL at 02:45

## 2022-09-17 RX ADMIN — SODIUM CHLORIDE, PRESERVATIVE FREE 10 ML: 5 INJECTION INTRAVENOUS at 22:10

## 2022-09-17 RX ADMIN — SODIUM CHLORIDE 25 ML/HR: 9 INJECTION, SOLUTION INTRAVENOUS at 22:03

## 2022-09-17 RX ADMIN — ENOXAPARIN SODIUM 30 MG: 100 INJECTION SUBCUTANEOUS at 12:33

## 2022-09-17 RX ADMIN — DIVALPROEX SODIUM 250 MG: 125 CAPSULE, COATED PELLETS ORAL at 09:37

## 2022-09-17 RX ADMIN — MEMANTINE HYDROCHLORIDE 5 MG: 5 TABLET ORAL at 10:58

## 2022-09-17 RX ADMIN — Medication 1 TABLET: at 09:37

## 2022-09-17 RX ADMIN — PANTOPRAZOLE SODIUM 40 MG: 40 TABLET, DELAYED RELEASE ORAL at 05:35

## 2022-09-17 NOTE — PROGRESS NOTES
Physical Therapy  Facility/Department: [facility]  Physical Therapy Initial Assessment    Name: Leah Strong  : 1940  MRN: [id number]  Date of Service: 2022    Discharge Recommendations:  Subacute/Skilled Nursing Facility              Assessment   Assessment: Pt is a 80 y.o. female with medical history, surgical history, co-morbidities, and personal factors including Anterior communicating artery aneurysm, ESBL (extended spectrum beta-lactamase) producing bacteria infection, and Expressive aphasia with admission for Altered mental status, Lower urinary tract infection, Elevated troponin, Dementia with behavioral disturbance, Fall, and Closed head injury. Prior to admission, pt was requiring assistance with functional mobility and ADLs. Examination of body systems reveals decreased strength, decreased balance, decreased aerobic capacity, impaired cognition, and decreased independence with functional mobility.          Therapy Prognosis: Good  Decision Making: High Complexity  Clinical Presentation: unpredictable characteristics  Requires PT Follow-Up: Yes  Activity Tolerance  Activity Tolerance: Patient tolerated evaluation without incident;Treatment limited secondary to decreased cognition     Plan   Plan  Plan: 3-5 times per week  Current Treatment Recommendations: Strengthening, ROM, Balance training, Functional mobility training, Transfer training, ADL/Self-care training, IADL training, Cognitive/Perceptual training, Endurance training, Wheelchair mobility training, Gait training, Equipment evaluation, education, & procurement, Stair training, Pain management, Positioning, Neuromuscular re-education, Home exercise program, Safety education & training, Patient/Caregiver education & training, Therapeutic activities  Safety Devices  Type of Devices: Patient at risk for falls, All fall risk precautions in place, Left in bed, Bed alarm in place, Call light within reach, Sitter present, Nurse notified, Gait belt     Restrictions  Restrictions/Precautions  Restrictions/Precautions: Fall Risk, General Precautions     Subjective   General  Chart Reviewed: Yes  Patient assessed for rehabilitation services?: Yes  Family / Caregiver Present: No  Follows Commands: Impaired  Subjective  Subjective: pain: denies; 0/10         Social/Functional History  Social/Functional History  Type of Home: Facility  ADL Assistance: Needs assistance  Homemaking Assistance: Needs assistance  Ambulation Assistance: Needs assistance  Transfer Assistance: Needs assistance    Vision/Hearing  Vision  Vision: Within Functional Limits  Hearing  Hearing: Exceptions to [facility] Exceptions: Hard of hearing/hearing concerns    Cognition   Orientation  Overall Orientation Status: Impaired  Orientation Level: Oriented to person;Disoriented to time;Disoriented to situation;Disoriented to place  Cognition  Overall Cognitive Status: Exceptions  Following Commands: Inconsistently follows commands  Attention Span: Attends with cues to redirect; Difficulty attending to directions  Safety Judgement: Decreased awareness of need for safety;Decreased awareness of need for assistance  Problem Solving: Decreased awareness of errors  Insights: Decreased awareness of deficits  Initiation: Requires cues for some  Sequencing: Requires cues for some     Objective           Gross Assessment  Sensation: Intact (BLEs)        Strength RLE  Comment: knee extension: at least 3+/5 observed functionally  Strength LLE  Comment: knee extension: at least 3+/5 observed functionally           Bed mobility  Supine to Sit: Moderate assistance;2 Person assistance (with verbal and tactile cues for BUE and BLE placement throughout transfer; with HOB elevated; with increased time for task completion)  Sit to Supine: Moderate assistance;2 Person assistance  Transfers  Sit to Stand:  Moderate Assistance;2 Person Assistance  Stand to sit: Moderate Assistance;2 Person Assistance DPT  License #: [id number]

## 2022-09-17 NOTE — ED PROVIDER NOTES
EMERGENCY DEPARTMENT ENCOUNTER        Pt Name: Robyn Herzog  MRN: [id number]  [place] 1940  Date of evaluation: 9/16/2022  Provider: KIRSTIN Logan  PCP: KALI Shaffer - CNP     I have seen and evaluated this patient with my supervising physician Dr. Candice Quiñonez   Patient presents with    Fall     Head injury, on baby aspirin, more altered than normal per facility, unsure of LOC           HISTORY OF PRESENT ILLNESS      Chief Complaint: Fall, altered mental status, dementia    Robyn Herzog is a 80 y.o. female who presents 81-year-old female presenting the emergency department today from long-term care facility after a fall and worsening altered mental status. Unclear of exactly how she fell. Patient has history of dementia and behavioral issues. States that she fell backward striking her head. No obvious loss of consciousness, there is a large hematoma to the posterior aspect of the scalp. No significant neck pain. Has no other complaints. Staff does state that she is been \"more altered than her normal\". Patient is alert oriented to name but not time or situation or place. Nursing Notes were all reviewed and agreed with or any disagreements were addressed in the HPI. REVIEW OF SYSTEMS     Difficult to assess secondary to patient's mental status. 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 No pertinent surgical history.     [address]       Current Discharge Medication List        CONTINUE these medications which have NOT CHANGED    Details   busPIRone (BUSPAR) 5 MG tablet Take 1 tablet by mouth 3 times daily  Qty: 90 tablet, Refills: 1      !! divalproex (DEPAKOTE SPRINKLE) 125 MG capsule Take 2 capsules by mouth daily  Qty: 60 capsule, Refills: 1      !! divalproex (DEPAKOTE SPRINKLE) 125 MG capsule Take 4 capsules by mouth nightly  Qty: 120 capsule, Refills: 1      doxepin (SINEQUAN) 10 MG capsule Take 1 capsule by mouth nightly  Qty: 30 capsule, Refills: 1      memantine (NAMENDA) 5 MG tablet Take 1 tablet by mouth daily  Qty: 30 tablet, Refills: 1      aspirin 81 MG chewable tablet Take 1 tablet by mouth daily  Qty: 30 tablet, Refills: 3      Calcium Carbonate-Vitamin D 500-125 MG-UNIT TABS Take 1 tablet by mouth daily      ezetimibe (ZETIA) 10 MG tablet Take 10 mg by mouth daily      omeprazole (PRILOSEC) 40 MG delayed release capsule Take 40 mg by mouth daily       ! ! - Potential duplicate medications found. Please discuss with provider. ALLERGIES     Ativan [lorazepam], Vicodin [hydrocodone-acetaminophen], and Valium [diazepam]    FAMILYHISTORY     History reviewed. No pertinent family history. SOCIAL HISTORY       Social History     Socioeconomic History    Marital status: Single     Spouse name: None    Number of children: None    Years of education: None    Highest education level: None   Tobacco Use    Smoking status: Former     Types: Cigarettes    Smokeless tobacco: Never   Substance and Sexual Activity    Alcohol use: Not Currently    Drug use: Never    Sexual activity: Not Currently       SCREENINGS    Katharine Coma Scale  Eye Opening: Spontaneous  Best Verbal Response: Confused  Best Motor Response: Obeys commands  [place] Coma Scale Score: 14      PHYSICAL EXAM       ED Triage Vitals   BP Temp Temp Source Heart Rate Resp SpO2 Height Weight   09/16/22 1444 09/16/22 1514 09/16/22 1514 09/16/22 1444 09/16/22 1444 09/16/22 1444 09/16/22 2045 09/16/22 2045   (!) 132/43 97.2 °F (36.2 °C) Axillary 96 18 100 % 5' 2.99\" (1.6 m) 95 lb 0.3 oz (43.1 kg)      Constitutional:  Well developed, Well nourished.   No distress  HENT: Normocephalic, large hematoma to the posterior aspect of the scalp, no obvious bleeding. No midline neck pain. No palpable swelling step-off. Neck/Lymphatics: supple, no JVD, no swollen nodes  Cardiovascular:   RRR,  no murmurs/rubs/gallops. Respiratory:   Nonlabored breathing. Normal breath sounds, No wheezing  Abdomen: Bowel sounds normal, Soft, No tenderness, no masses. Musculoskeletal:    No obvious bony abnormalities, no significant palpable bony tenderness in upper or lower extremity, pelvis. There is no edema, asymmetry, or calf / thigh tenderness bilaterally. No cyanosis. No cool or pale-appearing limb. Distal cap refill and pulses intact bilateral upper and lower extremities  Bilateral upper and lower extremity ROM intact without pain or obvious deficit  Integument:   Warm, Dry  Neurologic:  Alert & oriented , No focal deficits noted. Cranial nerves II through XII grossly intact. Normal gross motor coordination & motor strength bilateral upper and lower extremities  Sensation intact.   Psychiatric:  Affect normal, Mood normal.     DIAGNOSTIC RESULTS   LABS:    Labs Reviewed   CBC WITH AUTO DIFFERENTIAL - Abnormal; Notable for the following components:       Result Value    MCHC 31.6 (*)     Segs Relative 78.4 (*)     Lymphocytes % 9.8 (*)     Monocytes % 10.6 (*)     Immature Neutrophil % 0.6 (*)     All other components within normal limits   COMPREHENSIVE METABOLIC PANEL - Abnormal; Notable for the following components:    BUN 29 (*)     Glucose 103 (*)     AST 38 (*)     Alkaline Phosphatase 135 (*)     All other components within normal limits   TROPONIN - Abnormal; Notable for the following components:    Troponin T 0.012 (*)     All other components within normal limits   URINALYSIS WITH MICROSCOPIC - Abnormal; Notable for the following components:    Ketones, Urine 40 (*)     Blood, Urine MODERATE (*)     Protein,  (*)     Nitrite Urine, Quantitative POSITIVE (*)     Leukocyte Esterase, Urine LARGE (*)     RBC, UA 28 (*)     WBC, UA 2441 (*)     Bacteria, UA MANY (*)     All other components within normal limits   CULTURE, URINE   COMPREHENSIVE METABOLIC PANEL W/ REFLEX TO MG FOR LOW K   CBC WITH AUTO DIFFERENTIAL   TROPONIN   TROPONIN       When ordered, only abnormal lab results are displayed. All other labs were within normal range or not returned as of this dictation. EKG: When ordered, EKG's are interpreted by the Emergency Department Physician in the absence of a cardiologist.  Please see their note for interpretation of EKG. RADIOLOGY:   Non-plain film images such as CT, Ultrasound and MRI are read by the radiologist. Plain radiographic images are visualized and preliminarily interpreted by the  ED Provider with the below findings:    Interpretation perthe Radiologist below, if available at the time of this note:    XR CHEST PORTABLE   Final Result   1. No acute cardiopulmonary process identified. CT HEAD WO CONTRAST   Final Result   No acute intracranial abnormality. Generalized atrophy and chronic small vessel ischemic white matter disease. 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 CT CERVICAL SPINE WO CONTRAST   Final Result   No acute fracture or traumatic malalignment of the cervical spine. CT HEAD WO CONTRAST    Result Date: 9/16/2022  EXAMINATION: CT OF THE HEAD WITHOUT CONTRAST  9/16/2022 3:44 pm TECHNIQUE: CT of the head was performed without the administration of intravenous contrast. Automated exposure control, iterative reconstruction, and/or weight based adjustment of the mA/kV was utilized to reduce the radiation dose to as low as reasonably achievable. COMPARISON: 03/07/2021, 06/08/2020 and CTA head 06/08/2020 HISTORY: ORDERING SYSTEM PROVIDED HISTORY: fall, head injury TECHNOLOGIST PROVIDED HISTORY: Reason for exam:->fall, head injury Has a \"code stroke\" or \"stroke alert\" been called? ->No Decision Support Exception - unselect if not a suspected or confirmed emergency medical condition->Emergency Medical Condition (MA) Reason for Exam: fall, head injury FINDINGS: BRAIN/VENTRICLES: There is age-appropriate generalized atrophy and there is patchy periventricular and subcortical white matter low attenuation that is unchanged and nonspecific but most consistent with chronic small vessel ischemia. There is an unchanged 1.2 cm A-comm aneurysm (axial image 23). No evidence of acute hemorrhage, mass or extra-axial fluid collection. ORBITS: The visualized portion of the orbits demonstrate no acute abnormality. SINUSES: The visualized paranasal sinuses and mastoid air cells demonstrate no acute abnormality. SOFT TISSUES/SKULL:  No acute abnormality of the visualized skull or soft tissues. No acute intracranial abnormality. Generalized atrophy and chronic small vessel ischemic white matter disease. 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 CT CERVICAL SPINE WO CONTRAST    Result Date: 9/16/2022  EXAMINATION: CT OF THE CERVICAL SPINE WITHOUT CONTRAST 9/16/2022 3:45 pm TECHNIQUE: CT of the cervical spine was performed without the administration of intravenous contrast. Multiplanar reformatted images are provided for review. Automated exposure control, iterative reconstruction, and/or weight based adjustment of the mA/kV was utilized to reduce the radiation dose to as low as reasonably achievable. COMPARISON: None. HISTORY: ORDERING SYSTEM PROVIDED HISTORY: neck pain TECHNOLOGIST PROVIDED HISTORY: Reason for exam:->neck pain Decision Support Exception - unselect if not a suspected or confirmed emergency medical condition->Emergency Medical Condition (MA) Reason for Exam: neck pain FINDINGS: BONES/ALIGNMENT: There is a minimal degenerative anterolisthesis of C4 on C5. Vertebral body alignment is otherwise unremarkable. Cervical vertebral body heights are. Facet joints are normally aligned.   There is no acute fracture or traumatic malalignment. DEGENERATIVE CHANGES: There is moderate multilevel facet arthropathy. Moderate degenerative disc disease disc space narrowing and spondylosis at C5-C6. SOFT TISSUES: There is no prevertebral soft tissue swelling. No acute fracture or traumatic malalignment of the cervical spine. XR CHEST PORTABLE    Result Date: 9/16/2022  EXAMINATION: ONE XRAY VIEW OF THE CHEST 9/16/2022 7:52 pm COMPARISON: Chest x-ray March 7, 2021 HISTORY: ORDERING SYSTEM PROVIDED HISTORY: fall, elevated troponin TECHNOLOGIST PROVIDED HISTORY: Reason for exam:->fall, elevated troponin Reason for Exam: fall, elevated troponin Additional signs and symptoms: none FINDINGS: Cardiac silhouette is stable. No focal consolidation, pleural effusion, or pneumothorax. Osseous structures appear intact. Postoperative changes of cholecystectomy. 1. No acute cardiopulmonary process identified.          PROCEDURES   Unless otherwise noted below, none    CRITICAL CARE   CRITICAL CARE NOTE:   N/A    CONSULTS:  IP CONSULT TO HOSPITALIST      EMERGENCY DEPARTMENT COURSE and MDM:   Vitals:    Vitals:    09/16/22 1800 09/16/22 2045 09/16/22 2145 09/17/22 0215   BP: (!) 121/48  132/62 123/72   Pulse: 88  73 77   Resp: 17 18 22   Temp:   97.4 °F (36.3 °C) 97.5 °F (36.4 °C)   TempSrc:    Axillary   SpO2: 99%  100% 99%   Weight:  95 lb 0.3 oz (43.1 kg)     Height:  5' 2.99\" (1.6 m)         Patient was given thefollowing medications:  Medications   ezetimibe (ZETIA) tablet 10 mg (10 mg Oral Given 9/16/22 2212)   aspirin chewable tablet 81 mg (81 mg Oral Given 9/16/22 2212)   busPIRone (BUSPAR) tablet 5 mg (5 mg Oral Given 9/16/22 2212)   divalproex (DEPAKOTE SPRINKLE) capsule 250 mg (has no administration in time range)   divalproex (DEPAKOTE SPRINKLE) capsule 500 mg (500 mg Oral Given 9/16/22 2213)   doxepin (SINEQUAN) capsule 10 mg (10 mg Oral Given 9/16/22 2233)   memantine (NAMENDA) tablet 5 mg (5 mg Oral Given 9/16/22 2233)   pantoprazole (PROTONIX) tablet 40 mg (40 mg Oral Given 9/17/22 8135)   sodium chloride flush 0.9 % injection 5-40 mL (10 mLs IntraVENous Given 9/16/22 2233)   sodium chloride flush 0.9 % injection 5-40 mL (has no administration in time range)   0.9 % sodium chloride infusion (has no administration in time range)   ondansetron (ZOFRAN-ODT) disintegrating tablet 4 mg (has no administration in time range)     Or   ondansetron (ZOFRAN) injection 4 mg (has no administration in time range)   polyethylene glycol (GLYCOLAX) packet 17 g (has no administration in time range)   acetaminophen (TYLENOL) tablet 650 mg (has no administration in time range)     Or   acetaminophen (TYLENOL) suppository 650 mg (has no administration in time range)   0.9 % sodium chloride infusion ( IntraVENous New Bag 9/16/22 2222)   potassium chloride (KLOR-CON M) extended release tablet 40 mEq (has no administration in time range)     Or   potassium bicarb-citric acid (EFFER-K) effervescent tablet 40 mEq (has no administration in time range)     Or   potassium chloride 10 mEq/100 mL IVPB (Peripheral Line) (has no administration in time range)   magnesium sulfate 2000 mg in 50 mL IVPB premix (has no administration in time range)   cefTRIAXone (ROCEPHIN) 1,000 mg in dextrose 5 % 50 mL IVPB mini-bag (0 mg IntraVENous Stopped 9/16/22 2317)   enoxaparin Sodium (LOVENOX) injection 30 mg (has no administration in time range)   calcium-cholecalciferol 500-200 MG-UNIT per tablet 1 tablet (has no administration in time range)   0.9 % sodium chloride bolus (0 mLs IntraVENous Stopped 9/16/22 1916)   fentaNYL (SUBLIMAZE) injection 25 mcg (25 mcg IntraVENous Given 9/16/22 1650)   diazePAM (VALIUM) injection 10 mg (10 mg IntraVENous Given 9/16/22 1759)   zolpidem (AMBIEN) tablet 5 mg (5 mg Oral Given 9/17/22 0245)         Is this patient to be included in the SEP-1 Core Measure due to severe sepsis or septic shock?    No   Exclusion criteria - the patient is NOT to be included for SEP-1 Core Measure due to:  2+ SIRS criteria are not met    MDM:  Patient presents as above. Emergent etiologies considered. Patient seen and examined. Work-up initiated secondary to presentation, physical exam findings, vital signs and medical chart review. In brief, 81-year-old female coming from long-term care facility after a fall, has been a hematoma to the posterior aspect of the scalp. Patient is demented, has a history of behavioral issues, and nursing staff stating that she has been more altered in nature over the last several days has been more combative. She is cooperative during my encounter. She has no active complaints, she can only tell me her name, is not oriented to time or situation. She complains of no significant issues. She does have to be redirected    Did have to sedate slightly to get imaging studies. Patient had negative CT imaging of the head cervical spine. Does have a detectable troponin, possible urinary tract infection, will send culture. Will admit to hospitalist team.  Patient was seen and evaluated by Dr. Tyshawn Cr. CLINICAL IMPRESSION      1. Altered mental status, unspecified altered mental status type    2. Lower urinary tract infection    3. Elevated troponin    4. Dementia with behavioral disturbance, unspecified dementia type ([facility] [address]    5. Fall, initial encounter    6. Closed head injury, initial encounter          DISPOSITION/PLAN   DISPOSITION Admitted 09/16/2022 08:34:16 PM           (Please note that portions ofthis note were completed with a voice recognition program.  Efforts were made to edit the dictations but occasionally words are mis-transcribed. )    KIRSTIN Segovia (electronically signed)            KIRSTIN Baird  09/17/22 0102

## 2022-09-17 NOTE — H&P
History and Physical      Name:  Sinan Belcher /Age/Sex: 1940  (80 y.o. female)   MRN & CSN:  [id number] & [id number] Admission Date/Time: 2022  2:33 PM   Location:  [place] [address] [facility] [address] [facility] [address]    Assessment and Plan:   Sinan Belcher is a 80 y.o.  female  who presents with UTI (urinary tract infection)    Metabolic encephalopathy  -Multifactorial secondary to dementia, malnutrition and CVA. -Continue supportive measures. UTI  -UA shows yellow clear urine with 40 ketones large leukocyte esterase positive nitrite, 2441 WBC per high-power field  -IV Rocephin till urine cultures are available. History of fall  -CT head shows no acute abnormality. -CT cervical spine also shows no acute process. -PT OT for gait training    Severe protein calorie malnutrition  -Optimize p.o. diet    Increased troponins  -Troponin T first set is 0.012  -Twelve-lead ECG shows normal sinus rhythm.  -Continue to cycle troponins. Diet ADULT DIET; Regular   DVT Prophylaxis [x] Lovenox, []  Heparin, [] SCDs, [] Ambulation   GI Prophylaxis [x] PPI,  [] H2 Blocker,  [] Carafate,  [] Diet/Tube Feeds   Code Status Full Code   Disposition Patient requires continued admission due to metabolic encephalopathy   MDM [] Low, [] Moderate,[x]  High  Patient's risk as above due to metabolic encephalopathy     History of Present Illness:     Chief Complaint: UTI (urinary tract infection)  Sinan Belcher is a 80 y.o.  female  who presents with with advanced dementia who is a resident of a nursing home. She was found to be altered than usual and was then transferred to the ER for further work-up. Please be noted there is no family member or caregiver at the bedside to corroborate any history. Her baseline is not known to me. My discussion with the ER physician was that patient was more confused. It is not known what her baseline mental status is.   In the ER her serum chemistries were essentially acceptable and are noted below. Troponin T 0.012. Liver function tests show albumin 4.1 alkaline phosphatase 135 ALT 23 AST 38 bilirubin 0.4 total protein 7.4. Hematology labs showed WBC 9.1 hemoglobin 12.8 hematocrit 40.5 and platelet count of 089. At my point of examination patient is laying in the hallway bed. She is confused and mumbling. She does not have a good insight into her problems. She is only alert to her name. There is no preceding history any fever chills rigors. No history of chest pain shortness of breath diarrhea abdominal and dysuria hematuria melena hematochezia. Examination is somewhat limited secondary to patient's lack of cooperation. Patient will be admitted to the hospital service. Continue IV fluids for hydration. Optimize p.o. diet. IV Rocephin till urine cultures are available. Continue home meds as appropriate further therapy would depend hospital course the patient overall prognosis oscar very very guarded. Ten point ROS reviewed negative, unless as noted above    Objective:   No intake or output data in the 24 hours ending 09/16/22 2043   Vitals:   Vitals:    09/16/22 1800   BP: (!) 121/48   Pulse: 88   Resp: 17   Temp:    SpO2: 99%     Physical Exam:   GEN Awake female, in bed in no apparent distress. Appears given age. EYES Pupils are equally round. No scleral erythema, discharge, or conjunctivitis. HENT Mucous membranes are moist. Oral pharynx without exudates, no evidence of thrush. NECK Supple, no apparent thyromegaly or masses. RESP Clear to auscultation, no wheezes, rales or rhonchi. Symmetric chest movement while on room air. CARDIO/VASC S1/S2 auscultated. Regular rate without appreciable murmurs, rubs, or gallops. No JVD or carotid bruits. Peripheral pulses equal bilaterally and palpable. No peripheral edema. GI Abdomen is soft without significant tenderness, masses, or guarding. Bowel sounds are normoactive.  Rectal exam deferred.  No costovertebral angle tenderness. Normal appearing external genitalia. Barcenas catheter is not present. HEME/LYMPH No palpable cervical lymphadenopathy and no hepatosplenomegaly. No petechiae or ecchymoses. MSK No gross joint deformities. SKIN Normal coloration, warm, dry. NEURO Cranial nerves appear grossly intact, normal speech, no lateralizing weakness. Higher mental function are intact  PSYCH Awake, alert, oriented x 1. 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 Allergies: Allergies   Allergen Reactions    Ativan [Lorazepam] Other (See Comments)     Psychotic Reaction/Combative    Vicodin [Hydrocodone-Acetaminophen] Other (See Comments)     Psychotic reaction    Valium [Diazepam] Other (See Comments)     Ineffective       FAM HX: family history is not on file.   Soc HX:   Social History     Socioeconomic History    Marital status: Single     Spouse name: None    Number of children: None    Years of education: None    Highest education level: None   Tobacco Use    Smoking status: Former     Types: Cigarettes    Smokeless tobacco: Never   Substance and Sexual Activity    Alcohol use: Not Currently    Drug use: Never    Sexual activity: Not Currently       Data:   CBC with Differential:    Lab Results   Component Value Date/Time    WBC 9.1 09/16/2022 03:10 PM    RBC 4.51 09/16/2022 03:10 PM    HGB 12.8 09/16/2022 03:10 PM    HCT 40.5 09/16/2022 03:10 PM     09/16/2022 03:10 PM    MCV 89.8 09/16/2022 03:10 PM    MCH 28.4 09/16/2022 03:10 PM    MCHC 31.6 09/16/2022 03:10 PM    RDW 13.9 09/16/2022 03:10 PM    SEGSPCT 78.4 09/16/2022 03:10 PM    LYMPHOPCT 9.8 09/16/2022 03:10 PM    MONOPCT 10.6 09/16/2022 03:10 PM BASOPCT 0.2 09/16/2022 03:10 PM    MONOSABS 1.0 09/16/2022 03:10 PM    LYMPHSABS 0.9 09/16/2022 03:10 PM    EOSABS 0.0 09/16/2022 03:10 PM    BASOSABS 0.0 09/16/2022 03:10 PM    DIFFTYPE AUTOMATED DIFFERENTIAL 09/16/2022 03:10 PM       CMP:     Lab Results   Component Value Date/Time     09/16/2022 03:10 PM    K 3.5 09/16/2022 03:10 PM     09/16/2022 03:10 PM    CO2 25 09/16/2022 03:10 PM    BUN 29 09/16/2022 03:10 PM    CREATININE 0.7 09/16/2022 03:10 PM    GFRAA >60 09/16/2022 03:10 PM    LABGLOM >60 09/16/2022 03:10 PM    GLUCOSE 103 09/16/2022 03:10 PM    PROT 7.4 09/16/2022 03:10 PM    LABALBU 4.1 09/16/2022 03:10 PM    CALCIUM 9.4 09/16/2022 03:10 PM    BILITOT 0.4 09/16/2022 03:10 PM    ALKPHOS 135 09/16/2022 03:10 PM    AST 38 09/16/2022 03:10 PM    ALT 23 09/16/2022 03:10 PM       Troponin:  Lab Results   Component Value Date/Time    TROPONINT 0.012 09/16/2022 03:10 PM       U/A:    Lab Results   Component Value Date/Time    COLORU YELLOW 09/16/2022 07:05 PM    PROTEINU 100 09/16/2022 07:05 PM    WBCUA 2441 09/16/2022 07:05 PM    RBCUA 28 09/16/2022 07:05 PM    MUCUS RARE 09/02/2022 09:32 PM    TRICHOMONAS NONE SEEN 09/16/2022 07:05 PM    YEAST OCCASIONAL 03/29/2021 08:00 PM    BACTERIA MANY 09/16/2022 07:05 PM    CLARITYU CLEAR 09/16/2022 07:05 PM    SPECGRAV 1.020 09/16/2022 07:05 PM    LEUKOCYTESUR LARGE 09/16/2022 07:05 PM    UROBILINOGEN 1.0 09/16/2022 07:05 PM    BILIRUBINUR NEGATIVE 09/16/2022 07:05 PM    BLOODU MODERATE 09/16/2022 07:05 PM       Urine Culture:  No components found for: TINOINE    Radiology results:  CT HEAD WO CONTRAST   Final Result   No acute intracranial abnormality. Generalized atrophy and chronic small vessel ischemic white matter disease. 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 CT CERVICAL SPINE WO CONTRAST   Final Result   No acute fracture or traumatic malalignment of the cervical spine.          XR CHEST PORTABLE    (Results Pending)         Medications:   Medications:    ezetimibe  10 mg Oral Daily    calcium-vitamin D  1 tablet Oral Daily    aspirin  81 mg Oral Daily    busPIRone  5 mg Oral TID    [START ON 9/17/2022] divalproex  250 mg Oral Daily    divalproex  500 mg Oral Nightly    doxepin  10 mg Oral Nightly    memantine  5 mg Oral Daily    [START ON 9/17/2022] pantoprazole  40 mg Oral QAM AC    sodium chloride flush  5-40 mL IntraVENous 2 times per day    enoxaparin  40 mg SubCUTAneous Daily    cefTRIAXone (ROCEPHIN) IV  1,000 mg IntraVENous Q24H      Infusions:    sodium chloride      sodium chloride       PRN Meds: sodium chloride flush, 5-40 mL, PRN  sodium chloride, , PRN  ondansetron, 4 mg, Q8H PRN   Or  ondansetron, 4 mg, Q6H PRN  polyethylene glycol, 17 g, Daily PRN  acetaminophen, 650 mg, Q6H PRN   Or  acetaminophen, 650 mg, Q6H PRN  potassium chloride, 40 mEq, PRN   Or  potassium alternative oral replacement, 40 mEq, PRN   Or  potassium chloride, 10 mEq, PRN  magnesium sulfate, 2,000 mg, PRN          Electronically signed by Matt Coley MD on 9/16/2022 at 8:43 PM

## 2022-09-17 NOTE — PROGRESS NOTES
(36.7 °C)   SpO2: 99%       Physical Exam:     General: NAD  Eyes: EOMI  ENT: neck supple  Cardiovascular: Regular rate. Respiratory: Clear to auscultation  Gastrointestinal: Soft, non tender  Genitourinary: no suprapubic tenderness  Musculoskeletal: No edema  Skin: warm, dry  Neuro: Alert, oriented X1, confused. Psych: Mood appropriate.      Medications:   Medications:    ezetimibe  10 mg Oral Daily    aspirin  81 mg Oral Daily    busPIRone  5 mg Oral TID    divalproex  250 mg Oral Daily    divalproex  500 mg Oral Nightly    doxepin  10 mg Oral Nightly    memantine  5 mg Oral Daily    pantoprazole  40 mg Oral QAM AC    sodium chloride flush  5-40 mL IntraVENous 2 times per day    cefTRIAXone (ROCEPHIN) IV  1,000 mg IntraVENous Q24H    enoxaparin  30 mg SubCUTAneous Daily    calcium-cholecalciferol  1 tablet Oral Daily      Infusions:    sodium chloride       PRN Meds: sodium chloride flush, 5-40 mL, PRN  sodium chloride, , PRN  ondansetron, 4 mg, Q8H PRN   Or  ondansetron, 4 mg, Q6H PRN  polyethylene glycol, 17 g, Daily PRN  acetaminophen, 650 mg, Q6H PRN   Or  acetaminophen, 650 mg, Q6H PRN  potassium chloride, 40 mEq, PRN   Or  potassium alternative oral replacement, 40 mEq, PRN   Or  potassium chloride, 10 mEq, PRN  magnesium sulfate, 2,000 mg, PRN      Labs      Recent Results (from the past 24 hour(s))   EKG 12 Lead    Collection Time: 09/16/22  3:06 PM   Result Value Ref Range    Ventricular Rate 92 BPM    Atrial Rate 92 BPM    P-R Interval 124 ms    QRS Duration 74 ms    Q-T Interval 368 ms    QTc Calculation (Bazett) 455 ms    P Axis 59 degrees    R Axis 50 degrees    T Axis 69 degrees    Diagnosis       Normal sinus rhythm  Possible Anterior infarct , age undetermined  Abnormal ECG  When compared with ECG of 07-MAR-2021 21:24,  No significant change was found  Confirmed by DONG Soliman ([id number]) on 9/16/2022 4:47:50 PM     CBC with Auto Differential    Collection Time: 09/16/22  3:10 PM   Result Value Ref Range    WBC 9.1 4.0 - 10.5 K/CU MM    RBC 4.51 4.2 - 5.4 M/CU MM    Hemoglobin 12.8 12.5 - 16.0 GM/DL    Hematocrit 40.5 37 - 47 %    MCV 89.8 78 - 100 FL    MCH 28.4 27 - 31 PG    MCHC 31.6 (L) 32.0 - 36.0 %    RDW 13.9 11.7 - 14.9 %    Platelets 239 311 - 376 K/CU MM    MPV 9.0 7.5 - 11.1 FL    Differential Type AUTOMATED DIFFERENTIAL     Segs Relative 78.4 (H) 36 - 66 %    Lymphocytes % 9.8 (L) 24 - 44 %    Monocytes % 10.6 (H) 0 - 4 %    Eosinophils % 0.4 0 - 3 %    Basophils % 0.2 0 - 1 %    Segs Absolute 7.1 K/CU MM    Lymphocytes Absolute 0.9 K/CU MM    Monocytes Absolute 1.0 K/CU MM    Eosinophils Absolute 0.0 K/CU MM    Basophils Absolute 0.0 K/CU MM    Nucleated RBC % 0.0 %    Total Nucleated RBC 0.0 K/CU MM    Total Immature Neutrophil 0.05 K/CU MM    Immature Neutrophil % 0.6 (H) 0 - 0.43 %   Comprehensive Metabolic Panel    Collection Time: 09/16/22  3:10 PM   Result Value Ref Range    Sodium 143 135 - 145 MMOL/L    Potassium 3.5 3.5 - 5.1 MMOL/L    Chloride 104 99 - 110 mMol/L    CO2 25 21 - 32 MMOL/L    BUN 29 (H) 6 - 23 MG/DL    Creatinine 0.7 0.6 - 1.1 MG/DL    Glucose 103 (H) 70 - 99 MG/DL    Calcium 9.4 8.3 - 10.6 MG/DL    Albumin 4.1 3.4 - 5.0 GM/DL    Total Protein 7.4 6.4 - 8.2 GM/DL    Total Bilirubin 0.4 0.0 - 1.0 MG/DL    ALT 23 10 - 40 U/L    AST 38 (H) 15 - 37 IU/L    Alkaline Phosphatase 135 (H) 40 - 128 IU/L    GFR Non-African American >60 >60 mL/min/1.73m2    GFR African American >60 >60 mL/min/1.73m2    Anion Gap 14 4 - 16   Troponin    Collection Time: 09/16/22  3:10 PM   Result Value Ref Range    Troponin T 0.012 (H) <0.01 NG/ML   Urinalysis with Microscopic    Collection Time: 09/16/22  7:05 PM   Result Value Ref Range    Color, UA YELLOW YELLOW    Clarity, UA CLEAR CLEAR    Glucose, Urine NEGATIVE NEGATIVE MG/DL    Bilirubin Urine NEGATIVE NEGATIVE MG/DL    Ketones, Urine 40 (A) NEGATIVE MG/DL    Specific Gravity, UA 1.020 1.001 - 1.035    Blood, Urine MODERATE (A) NEGATIVE pH, Urine 6.0 5.0 - 8.0    Protein,  (A) NEGATIVE MG/DL    Urobilinogen, Urine 1.0 0.2 - 1.0 MG/DL    Nitrite Urine, Quantitative POSITIVE (A) NEGATIVE    Leukocyte Esterase, Urine LARGE (A) NEGATIVE    RBC, UA 28 (H) 0 - 6 /HPF    WBC, UA 2441 (H) 0 - 5 /HPF    Bacteria, UA MANY (A) NEGATIVE /HPF    WBC Clumps, UA MANY /HPF    Trichomonas, UA NONE SEEN NONE SEEN /HPF   Comprehensive Metabolic Panel w/ Reflex to MG    Collection Time: 09/17/22  9:32 AM   Result Value Ref Range    Sodium 143 135 - 145 MMOL/L    Potassium 3.1 (L) 3.5 - 5.1 MMOL/L    Chloride 106 99 - 110 mMol/L    CO2 26 21 - 32 MMOL/L    BUN 15 6 - 23 MG/DL    Creatinine 0.5 (L) 0.6 - 1.1 MG/DL    Glucose 86 70 - 99 MG/DL    Calcium 8.6 8.3 - 10.6 MG/DL    Albumin 4.1 3.4 - 5.0 GM/DL    Total Protein 6.6 6.4 - 8.2 GM/DL    Total Bilirubin 0.3 0.0 - 1.0 MG/DL    ALT 22 10 - 40 U/L    AST 34 15 - 37 IU/L    Alkaline Phosphatase 128 40 - 129 IU/L    GFR Non-African American >60 >60 mL/min/1.73m2    GFR African American >60 >60 mL/min/1.73m2    Anion Gap 11 4 - 16   CBC with Auto Differential    Collection Time: 09/17/22  9:32 AM   Result Value Ref Range    WBC 9.7 4.0 - 10.5 K/CU MM    RBC 4.42 4.2 - 5.4 M/CU MM    Hemoglobin 12.5 12.5 - 16.0 GM/DL    Hematocrit 39.7 37 - 47 %    MCV 89.8 78 - 100 FL    MCH 28.3 27 - 31 PG    MCHC 31.5 (L) 32.0 - 36.0 %    RDW 13.5 11.7 - 14.9 %    Platelets 732 523 - 068 K/CU MM    MPV 8.8 7.5 - 11.1 FL    Differential Type AUTOMATED DIFFERENTIAL     Segs Relative 79.5 (H) 36 - 66 %    Lymphocytes % 9.1 (L) 24 - 44 %    Monocytes % 10.0 (H) 0 - 4 %    Eosinophils % 0.6 0 - 3 %    Basophils % 0.4 0 - 1 %    Segs Absolute 7.7 K/CU MM    Lymphocytes Absolute 0.9 K/CU MM    Monocytes Absolute 1.0 K/CU MM    Eosinophils Absolute 0.1 K/CU MM    Basophils Absolute 0.0 K/CU MM    Nucleated RBC % 0.0 %    Total Nucleated RBC 0.0 K/CU MM    Total Immature Neutrophil 0.04 K/CU MM    Immature Neutrophil % 0.4 0 - 0.43 % Troponin    Collection Time: 09/17/22  9:32 AM   Result Value Ref Range    Troponin T <0.010 <0.01 NG/ML   Magnesium    Collection Time: 09/17/22  9:32 AM   Result Value Ref Range    Magnesium 1.7 (L) 1.8 - 2.4 mg/dl        Imaging/Diagnostics Last 24 Hours   CT HEAD WO CONTRAST    Result Date: 9/16/2022  EXAMINATION: CT OF THE HEAD WITHOUT CONTRAST  9/16/2022 3:44 pm TECHNIQUE: CT of the head was performed without the administration of intravenous contrast. Automated exposure control, iterative reconstruction, and/or weight based adjustment of the mA/kV was utilized to reduce the radiation dose to as low as reasonably achievable. COMPARISON: 03/07/2021, 06/08/2020 and CTA head 06/08/2020 HISTORY: ORDERING SYSTEM PROVIDED HISTORY: fall, head injury TECHNOLOGIST PROVIDED HISTORY: Reason for exam:->fall, head injury Has a \"code stroke\" or \"stroke alert\" been called? ->No Decision Support Exception - unselect if not a suspected or confirmed emergency medical condition->Emergency Medical Condition (MA) Reason for Exam: fall, head injury FINDINGS: BRAIN/VENTRICLES: There is age-appropriate generalized atrophy and there is patchy periventricular and subcortical white matter low attenuation that is unchanged and nonspecific but most consistent with chronic small vessel ischemia. There is an unchanged 1.2 cm A-comm aneurysm (axial image 23). No evidence of acute hemorrhage, mass or extra-axial fluid collection. ORBITS: The visualized portion of the orbits demonstrate no acute abnormality. SINUSES: The visualized paranasal sinuses and mastoid air cells demonstrate no acute abnormality. SOFT TISSUES/SKULL:  No acute abnormality of the visualized skull or soft tissues. No acute intracranial abnormality. Generalized atrophy and chronic small vessel ischemic white matter disease. 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      CT CERVICAL SPINE WO CONTRAST    Result Date: 9/16/2022  EXAMINATION: CT OF THE CERVICAL SPINE WITHOUT CONTRAST 9/16/2022 3:45 pm TECHNIQUE: CT of the cervical spine was performed without the administration of intravenous contrast. Multiplanar reformatted images are provided for review. Automated exposure control, iterative reconstruction, and/or weight based adjustment of the mA/kV was utilized to reduce the radiation dose to as low as reasonably achievable. COMPARISON: None. HISTORY: ORDERING SYSTEM PROVIDED HISTORY: neck pain TECHNOLOGIST PROVIDED HISTORY: Reason for exam:->neck pain Decision Support Exception - unselect if not a suspected or confirmed emergency medical condition->Emergency Medical Condition (MA) Reason for Exam: neck pain FINDINGS: BONES/ALIGNMENT: There is a minimal degenerative anterolisthesis of C4 on C5. Vertebral body alignment is otherwise unremarkable. Cervical vertebral body heights are. Facet joints are normally aligned. There is no acute fracture or traumatic malalignment. DEGENERATIVE CHANGES: There is moderate multilevel facet arthropathy. Moderate degenerative disc disease disc space narrowing and spondylosis at C5-C6. SOFT TISSUES: There is no prevertebral soft tissue swelling. No acute fracture or traumatic malalignment of the cervical spine. XR CHEST PORTABLE    Result Date: 9/16/2022  EXAMINATION: ONE XRAY VIEW OF THE CHEST 9/16/2022 7:52 pm COMPARISON: Chest x-ray March 7, 2021 HISTORY: ORDERING SYSTEM PROVIDED HISTORY: fall, elevated troponin TECHNOLOGIST PROVIDED HISTORY: Reason for exam:->fall, elevated troponin Reason for Exam: fall, elevated troponin Additional signs and symptoms: none FINDINGS: Cardiac silhouette is stable. No focal consolidation, pleural effusion, or pneumothorax. Osseous structures appear intact. Postoperative changes of cholecystectomy. 1. No acute cardiopulmonary process identified.        Electronically signed by Susan Monroy MD on 9/17/2022 at 1:28 PM

## 2022-09-17 NOTE — PROGRESS NOTES
[id number], 1940, [id number], 9/17/2022    Discharge Recommendation: SNF    History:  [place]:  The primary encounter diagnosis was Altered mental status, unspecified altered mental status type. Diagnoses of Lower urinary tract infection, Elevated troponin, Dementia with behavioral disturbance, unspecified dementia type ([address] Fall, initial encounter, and Closed head injury, initial encounter were also pertinent to this visit. Subjective:  Patient states: \"well come on!\"  Pain: YAZ  Communication with other providers: coeval with PT renetta Georges present, cleared with nursing  Restrictions: general precautions, fall risk, cognition, flight risk    Home Setup/Prior level of function:   Per charting, pt from [employer], receiving unknown level of assistance. Family not present to confirm with. Examination:  Observation: Pt was supine in bed upon arrival receiving max redirection from sitter, agreeable to session  [facility]: [facility]  Hearing: WFL  Objective Measures: stable    Body Systems and functions:  ROM: WFL in BUE  Strength: YAZ formally, grossly 4-/5 in BUE  Sensation: YAZ  Tone: hypotonic  Coordination: grossly impaired  Activity Tolerance: poor    Activities of Daily Living (ADLs):  Feeding: mod A  Grooming: max A  Toileting: dep  UB dressing/bathing: max A  LB dressing/bathing: dep    *pt ADL function inferred from gross functional assessment of mobility, balance, posture, safety awareness, activity tolerance (unless otherwise indicated)    Cognitive and Psychosocial Functioning:  Overall cognitive status: A/Ox0, responds to her name. Pt very confused, requires max redirection to task. Follows 1/5 simple cues. Very impaired.    Affect: very confused, lethargic at end of session    Functional Mobility:  Bed Mobility: mod Ax2  Sit <> Stand: mod Ax2  Ambulation: mod Ax2 with HHA for side steps along EOB      AM-PAC 6 encephalopathy/UTI related. Pt would benefit from continued IP OT services during their stay, and discharge to SNF. Will follow loosely for assistance with discharge planning. Complexity: mod  Prognosis: guarded  Barriers: cognition, deconditioning, safety    Plan:  Plan: 2+/week    *will continue to monitor pt progress, participation, and appropriateness with future therapy sessions, adjust frequency as appropriate. Treatment to include: Strengthening, ROM, Balance Training, Functional Mobility Training, Endurance Training, Gait Training, Pain Management, Safety Education and Training, Patient+Caregiver Education and Training, Equipment Evaluation Education and Procurement, Positioning, Self Care Training, Home Management Training, Coordination Training, cognitive reorientation, cognitive/perceptual training.     Pt Would Benefit from Continued Edu on safety  Adaptive Equipment Recommendations: none    Goals:  Time frame for goals: 2 weeks  Pt will complete feeding tasks with min A  Pt will complete grooming tasks with min A seated at EOB unsupported  Pt will complete toileting tasks with max A using BSC  Pt will complete UB dressing and bathing tasks with min A  Pt will complete LB dressing and bathing tasks with max A  Pt will complete therapeutic exercise/activity to increase independence in ADL/IADL function  Pt will practice functional transfers and mobility with AD for increased safety and independence    Time:   Time in: 1125  Time out: 1137  Treatment Minutes: 0  Evaluation Minutes: 12  Total time: 12    Electronically signed by:      FORD Singleton  9/17/2022, 1:02 PM

## 2022-09-18 LAB
ALBUMIN SERPL-MCNC: 3.8 GM/DL (ref 3.4–5)
ANION GAP SERPL CALCULATED.3IONS-SCNC: 10 MMOL/L (ref 4–16)
BUN BLDV-MCNC: 10 MG/DL (ref 6–23)
CALCIUM SERPL-MCNC: 8.6 MG/DL (ref 8.3–10.6)
CHLORIDE BLD-SCNC: 110 MMOL/L (ref 99–110)
CO2: 26 MMOL/L (ref 21–32)
CREAT SERPL-MCNC: 0.5 MG/DL (ref 0.6–1.1)
GFR AFRICAN AMERICAN: >60 ML/MIN/1.73M2
GFR NON-AFRICAN AMERICAN: >60 ML/MIN/1.73M2
GLUCOSE BLD-MCNC: 74 MG/DL (ref 70–99)
GLUCOSE BLD-MCNC: 77 MG/DL (ref 70–99)
MAGNESIUM: 2.5 MG/DL (ref 1.8–2.4)
PHOSPHORUS: 3.1 MG/DL (ref 2.5–4.9)
POTASSIUM SERPL-SCNC: 3.6 MMOL/L (ref 3.5–5.1)
SODIUM BLD-SCNC: 146 MMOL/L (ref 135–145)

## 2022-09-18 PROCEDURE — 1200000000 HC SEMI PRIVATE

## 2022-09-18 PROCEDURE — 83735 ASSAY OF MAGNESIUM: CPT

## 2022-09-18 PROCEDURE — 2500000003 HC RX 250 WO HCPCS: Performed by: STUDENT IN AN ORGANIZED HEALTH CARE EDUCATION/TRAINING PROGRAM

## 2022-09-18 PROCEDURE — 6360000002 HC RX W HCPCS: Performed by: HOSPITALIST

## 2022-09-18 PROCEDURE — 82962 GLUCOSE BLOOD TEST: CPT

## 2022-09-18 PROCEDURE — 2580000003 HC RX 258: Performed by: STUDENT IN AN ORGANIZED HEALTH CARE EDUCATION/TRAINING PROGRAM

## 2022-09-18 PROCEDURE — 6370000000 HC RX 637 (ALT 250 FOR IP): Performed by: HOSPITALIST

## 2022-09-18 PROCEDURE — 36415 COLL VENOUS BLD VENIPUNCTURE: CPT

## 2022-09-18 PROCEDURE — 80069 RENAL FUNCTION PANEL: CPT

## 2022-09-18 PROCEDURE — 94761 N-INVAS EAR/PLS OXIMETRY MLT: CPT

## 2022-09-18 PROCEDURE — 2580000003 HC RX 258: Performed by: HOSPITALIST

## 2022-09-18 RX ORDER — DEXTROSE AND SODIUM CHLORIDE 5; .45 G/100ML; G/100ML
INJECTION, SOLUTION INTRAVENOUS CONTINUOUS
Status: ACTIVE | OUTPATIENT
Start: 2022-09-18 | End: 2022-09-18

## 2022-09-18 RX ORDER — OLANZAPINE 10 MG/1
2.5 INJECTION, POWDER, LYOPHILIZED, FOR SOLUTION INTRAMUSCULAR
Status: DISCONTINUED | OUTPATIENT
Start: 2022-09-18 | End: 2022-09-18

## 2022-09-18 RX ORDER — OLANZAPINE 10 MG/1
2.5 INJECTION, POWDER, LYOPHILIZED, FOR SOLUTION INTRAMUSCULAR
Status: COMPLETED | OUTPATIENT
Start: 2022-09-18 | End: 2022-09-18

## 2022-09-18 RX ADMIN — CEFTRIAXONE 1000 MG: 1 INJECTION, POWDER, FOR SOLUTION INTRAMUSCULAR; INTRAVENOUS at 23:30

## 2022-09-18 RX ADMIN — BUSPIRONE HYDROCHLORIDE 5 MG: 5 TABLET ORAL at 23:27

## 2022-09-18 RX ADMIN — DOXEPIN HYDROCHLORIDE 10 MG: 10 CAPSULE ORAL at 23:27

## 2022-09-18 RX ADMIN — SODIUM CHLORIDE, PRESERVATIVE FREE 10 ML: 5 INJECTION INTRAVENOUS at 11:06

## 2022-09-18 RX ADMIN — PANTOPRAZOLE SODIUM 40 MG: 40 TABLET, DELAYED RELEASE ORAL at 06:39

## 2022-09-18 RX ADMIN — SODIUM CHLORIDE, PRESERVATIVE FREE 10 ML: 5 INJECTION INTRAVENOUS at 23:34

## 2022-09-18 RX ADMIN — CEFTRIAXONE 1000 MG: 1 INJECTION, POWDER, FOR SOLUTION INTRAMUSCULAR; INTRAVENOUS at 00:58

## 2022-09-18 RX ADMIN — DEXTROSE AND SODIUM CHLORIDE: 5; 450 INJECTION, SOLUTION INTRAVENOUS at 11:05

## 2022-09-18 RX ADMIN — DIVALPROEX SODIUM 500 MG: 125 CAPSULE, COATED PELLETS ORAL at 23:27

## 2022-09-18 RX ADMIN — OLANZAPINE 2.5 MG: 10 INJECTION, POWDER, FOR SOLUTION INTRAMUSCULAR at 14:04

## 2022-09-18 NOTE — PROGRESS NOTES
Pt has a one on one hands on sitter related to dementia/confusion that is causing safety issues that may be harmful to patient. She has been very agitated and confused and it has become increasingly hard to keep her safely in the bed. She is continually pulling on her IV, smacking at staff and throwing legs over rails. Hospitalist notified and does not want to prescribe antianxiety medication related to pt dementia. He gave order for nonviolent restraints and they were initiated. Pt is clean and dry and refusing fluids at this time. Sitter is at bedside.

## 2022-09-18 NOTE — PROGRESS NOTES
V2.0  [facility] Hospitalist Progress Note      Name:  Rivas Quintanilla /Age/Sex: 1940  (80 y.o. female)   MRN & CSN:  [id number] & [id number] Encounter Date/Time: 2022 1:25 PM EDT    [facility] PCP: Kenney Pedroza [address] [facility] Day: 3      Subjective:     Chief Complaint: Fall (Head injury, on baby aspirin, more altered than normal per facility, unsure of LOC/)    Patient has been having a sitter at bedside. She was very agitated yesterday and it was hard to keep her safe. She she was pulling on her IV smacking a staff and throwing legs over rails. Restraints were applied which were later removed this morning. Patient seen and examined at bedside. Unable to obtain ROS given her dementia. Patient is awake answering no to all questions. She was trying to get out of her bed but was prevented with the help of tech. Assessment and Plan:   Rivas Quintanilla is a 80 y.o. female presents with AMS      Plan:  #acute metabolic encephalopathy  -Multifactorial secondary to dementia, malnutrition, UTI and CVA. -Continue supportive measures.  -Patient is very agitated we will give Zyprexa 2.5    #UTI  -UA shows yellow clear urine with 40 ketones large leukocyte esterase positive nitrite, 2441 WBC per high-power field  -IV Rocephin till urine cultures are available. #Hypernatremia  Today Na 146; likely 2/2 poor water intake. - will do 0.45D5  - encourage oral intake as possible    #History of fall  -CT head shows no acute abnormality. -CT cervical spine also shows no acute process. -PT OT for gait training    #Severe protein calorie malnutrition  -Optimize p.o. diet    #Increased troponins  Likely demand. Repeat troponin trending down . -Twelve-lead ECG shows normal sinus rhythm. Diet ADULT DIET;  Regular   DVT Prophylaxis [x] Lovenox, []  Heparin, [] SCDs, [] Ambulation,  [] Eliquis, [] Xarelto  [] Coumadin   Code Status Full Code   Disposition From: NH  Expected Disposition: NH  Estimated Date of Discharge: 1-2 days  Patient requires continued admission due to AMS   Surrogate Decision Maker/ POA           Review of Systems:    Review of Systems    See above    Objective: Intake/Output Summary (Last 24 hours) at 9/18/2022 0941  Last data filed at 9/17/2022 2120  Gross per 24 hour   Intake 75 ml   Output --   Net 75 ml        Vitals:   Vitals:    09/18/22 0203   BP: (!) 108/56   Pulse:    Resp:    Temp: 98.3 °F (36.8 °C)   SpO2: 95%       Physical Exam:     General: NAD  Eyes: EOMI  ENT: neck supple  Cardiovascular: Regular rate. Respiratory: Clear to auscultation  Gastrointestinal: Soft, non tender  Genitourinary: no suprapubic tenderness  Musculoskeletal: No edema  Skin: warm, dry  Neuro: Alert, oriented X1, confused, agitated not following commands. Psych: Mood appropriate.      Medications:   Medications:    ezetimibe  10 mg Oral Daily    aspirin  81 mg Oral Daily    busPIRone  5 mg Oral TID    divalproex  250 mg Oral Daily    divalproex  500 mg Oral Nightly    doxepin  10 mg Oral Nightly    memantine  5 mg Oral Daily    pantoprazole  40 mg Oral QAM AC    sodium chloride flush  5-40 mL IntraVENous 2 times per day    cefTRIAXone (ROCEPHIN) IV  1,000 mg IntraVENous Q24H    enoxaparin  30 mg SubCUTAneous Daily    calcium-cholecalciferol  1 tablet Oral Daily      Infusions:    dextrose 5 % and 0.45 % NaCl      sodium chloride 25 mL/hr (09/17/22 2203)     PRN Meds: OLANZapine, 2.5 mg, Once PRN  sodium chloride flush, 5-40 mL, PRN  sodium chloride, , PRN  ondansetron, 4 mg, Q8H PRN   Or  ondansetron, 4 mg, Q6H PRN  polyethylene glycol, 17 g, Daily PRN  acetaminophen, 650 mg, Q6H PRN   Or  acetaminophen, 650 mg, Q6H PRN      Labs      Recent Results (from the past 24 hour(s))   Renal Function Panel    Collection Time: 09/18/22  3:42 AM   Result Value Ref Range    Sodium 146 (H) 135 - 145 MMOL/L    Potassium 3.6 3.5 - 5.1 MMOL/L    Chloride 110 99 - 110 mMol/L    CO2 26 21 - 32 MMOL/L    Anion Gap 10 4 - 16    BUN 10 6 - 23 MG/DL    Creatinine 0.5 (L) 0.6 - 1.1 MG/DL    Glucose 77 70 - 99 MG/DL    Calcium 8.6 8.3 - 10.6 MG/DL    GFR Non-African American >60 >60 mL/min/1.73m2    GFR African American >60 >60 mL/min/1.73m2    Albumin 3.8 3.4 - 5.0 GM/DL    Phosphorus 3.1 2.5 - 4.9 MG/DL   Magnesium    Collection Time: 09/18/22  3:42 AM   Result Value Ref Range    Magnesium 2.5 (H) 1.8 - 2.4 mg/dl        Imaging/Diagnostics Last 24 Hours   CT HEAD WO CONTRAST    Result Date: 9/16/2022  EXAMINATION: CT OF THE HEAD WITHOUT CONTRAST  9/16/2022 3:44 pm TECHNIQUE: CT of the head was performed without the administration of intravenous contrast. Automated exposure control, iterative reconstruction, and/or weight based adjustment of the mA/kV was utilized to reduce the radiation dose to as low as reasonably achievable. COMPARISON: 03/07/2021, 06/08/2020 and CTA head 06/08/2020 HISTORY: ORDERING SYSTEM PROVIDED HISTORY: fall, head injury TECHNOLOGIST PROVIDED HISTORY: Reason for exam:->fall, head injury Has a \"code stroke\" or \"stroke alert\" been called? ->No Decision Support Exception - unselect if not a suspected or confirmed emergency medical condition->Emergency Medical Condition (MA) Reason for Exam: fall, head injury FINDINGS: BRAIN/VENTRICLES: There is age-appropriate generalized atrophy and there is patchy periventricular and subcortical white matter low attenuation that is unchanged and nonspecific but most consistent with chronic small vessel ischemia. There is an unchanged 1.2 cm A-comm aneurysm (axial image 23). No evidence of acute hemorrhage, mass or extra-axial fluid collection. ORBITS: The visualized portion of the orbits demonstrate no acute abnormality. SINUSES: The visualized paranasal sinuses and mastoid air cells demonstrate no acute abnormality. SOFT TISSUES/SKULL:  No acute abnormality of the visualized skull or soft tissues.      No acute intracranial abnormality. Generalized atrophy and chronic small vessel ischemic white matter disease. 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 CT CERVICAL SPINE WO CONTRAST    Result Date: 9/16/2022  EXAMINATION: CT OF THE CERVICAL SPINE WITHOUT CONTRAST 9/16/2022 3:45 pm TECHNIQUE: CT of the cervical spine was performed without the administration of intravenous contrast. Multiplanar reformatted images are provided for review. Automated exposure control, iterative reconstruction, and/or weight based adjustment of the mA/kV was utilized to reduce the radiation dose to as low as reasonably achievable. COMPARISON: None. HISTORY: ORDERING SYSTEM PROVIDED HISTORY: neck pain TECHNOLOGIST PROVIDED HISTORY: Reason for exam:->neck pain Decision Support Exception - unselect if not a suspected or confirmed emergency medical condition->Emergency Medical Condition (MA) Reason for Exam: neck pain FINDINGS: BONES/ALIGNMENT: There is a minimal degenerative anterolisthesis of C4 on C5. Vertebral body alignment is otherwise unremarkable. Cervical vertebral body heights are. Facet joints are normally aligned. There is no acute fracture or traumatic malalignment. DEGENERATIVE CHANGES: There is moderate multilevel facet arthropathy. Moderate degenerative disc disease disc space narrowing and spondylosis at C5-C6. SOFT TISSUES: There is no prevertebral soft tissue swelling. No acute fracture or traumatic malalignment of the cervical spine. XR CHEST PORTABLE    Result Date: 9/16/2022  EXAMINATION: ONE XRAY VIEW OF THE CHEST 9/16/2022 7:52 pm COMPARISON: Chest x-ray March 7, 2021 HISTORY: ORDERING SYSTEM PROVIDED HISTORY: fall, elevated troponin TECHNOLOGIST PROVIDED HISTORY: Reason for exam:->fall, elevated troponin Reason for Exam: fall, elevated troponin Additional signs and symptoms: none FINDINGS: Cardiac silhouette is stable. No focal consolidation, pleural effusion, or pneumothorax. Osseous structures appear intact. Postoperative changes of cholecystectomy. 1. No acute cardiopulmonary process identified.        Electronically signed by Darnell Ivory MD on 9/18/2022 at 9:41 AM

## 2022-09-18 NOTE — PLAN OF CARE
Problem: Discharge Planning  Goal: Discharge to home or other facility with appropriate resources  Outcome: Progressing     Problem: ABCDS Injury Assessment  Goal: Absence of physical injury  Outcome: Progressing     Problem: Skin/Tissue Integrity  Goal: Absence of new skin breakdown  Description: 1. Monitor for areas of redness and/or skin breakdown  2. Assess vascular access sites hourly  3. Every 4-6 hours minimum:  Change oxygen saturation probe site  4. Every 4-6 hours:  If on nasal continuous positive airway pressure, respiratory therapy assess nares and determine need for appliance change or resting period. Outcome: Progressing     Problem: Safety - Adult  Goal: Free from fall injury  Outcome: Progressing     Problem: Safety - Medical Restraint  Goal: Remains free of injury from restraints (Restraint for Interference with Medical Device)  Description: INTERVENTIONS:  1. Determine that other, less restrictive measures have been tried or would not be effective before applying the restraint  2. Evaluate the patient's condition at the time of restraint application  3. Inform patient/family regarding the reason for restraint  4.  Q2H: Monitor safety, psychosocial status, comfort, nutrition and hydration  Outcome: Progressing

## 2022-09-19 VITALS
OXYGEN SATURATION: 94 % | SYSTOLIC BLOOD PRESSURE: 135 MMHG | DIASTOLIC BLOOD PRESSURE: 49 MMHG | HEART RATE: 68 BPM | HEIGHT: 63 IN | TEMPERATURE: 97.5 F | RESPIRATION RATE: 16 BRPM | BODY MASS INDEX: 16.07 KG/M2 | WEIGHT: 90.7 LBS

## 2022-09-19 LAB
ANION GAP SERPL CALCULATED.3IONS-SCNC: 10 MMOL/L (ref 4–16)
BASOPHILS ABSOLUTE: 0 K/CU MM
BASOPHILS RELATIVE PERCENT: 0.6 % (ref 0–1)
BUN BLDV-MCNC: 9 MG/DL (ref 6–23)
CALCIUM SERPL-MCNC: 8.8 MG/DL (ref 8.3–10.6)
CHLORIDE BLD-SCNC: 109 MMOL/L (ref 99–110)
CO2: 26 MMOL/L (ref 21–32)
CREAT SERPL-MCNC: 0.5 MG/DL (ref 0.6–1.1)
CULTURE: ABNORMAL
CULTURE: ABNORMAL
DIFFERENTIAL TYPE: ABNORMAL
EOSINOPHILS ABSOLUTE: 0.1 K/CU MM
EOSINOPHILS RELATIVE PERCENT: 2.4 % (ref 0–3)
GFR AFRICAN AMERICAN: >60 ML/MIN/1.73M2
GFR NON-AFRICAN AMERICAN: >60 ML/MIN/1.73M2
GLUCOSE BLD-MCNC: 101 MG/DL (ref 70–99)
HCT VFR BLD CALC: 39.9 % (ref 37–47)
HEMOGLOBIN: 12.5 GM/DL (ref 12.5–16)
IMMATURE NEUTROPHIL %: 0.8 % (ref 0–0.43)
LYMPHOCYTES ABSOLUTE: 0.9 K/CU MM
LYMPHOCYTES RELATIVE PERCENT: 17.3 % (ref 24–44)
Lab: ABNORMAL
MAGNESIUM: 1.9 MG/DL (ref 1.8–2.4)
MCH RBC QN AUTO: 27.7 PG (ref 27–31)
MCHC RBC AUTO-ENTMCNC: 31.3 % (ref 32–36)
MCV RBC AUTO: 88.3 FL (ref 78–100)
MONOCYTES ABSOLUTE: 0.7 K/CU MM
MONOCYTES RELATIVE PERCENT: 13.7 % (ref 0–4)
NUCLEATED RBC %: 0 %
PDW BLD-RTO: 13.8 % (ref 11.7–14.9)
PLATELET # BLD: 192 K/CU MM (ref 140–440)
PMV BLD AUTO: 8.8 FL (ref 7.5–11.1)
POTASSIUM SERPL-SCNC: 3.1 MMOL/L (ref 3.5–5.1)
RBC # BLD: 4.52 M/CU MM (ref 4.2–5.4)
SARS-COV-2, NAAT: NOT DETECTED
SEGMENTED NEUTROPHILS ABSOLUTE COUNT: 3.3 K/CU MM
SEGMENTED NEUTROPHILS RELATIVE PERCENT: 65.2 % (ref 36–66)
SODIUM BLD-SCNC: 145 MMOL/L (ref 135–145)
SOURCE: NORMAL
SPECIMEN: ABNORMAL
TOTAL IMMATURE NEUTOROPHIL: 0.04 K/CU MM
TOTAL NUCLEATED RBC: 0 K/CU MM
WBC # BLD: 5 K/CU MM (ref 4–10.5)

## 2022-09-19 PROCEDURE — 83735 ASSAY OF MAGNESIUM: CPT

## 2022-09-19 PROCEDURE — 6370000000 HC RX 637 (ALT 250 FOR IP): Performed by: HOSPITALIST

## 2022-09-19 PROCEDURE — 85025 COMPLETE CBC W/AUTO DIFF WBC: CPT

## 2022-09-19 PROCEDURE — 2580000003 HC RX 258: Performed by: HOSPITALIST

## 2022-09-19 PROCEDURE — 94761 N-INVAS EAR/PLS OXIMETRY MLT: CPT

## 2022-09-19 PROCEDURE — 36415 COLL VENOUS BLD VENIPUNCTURE: CPT

## 2022-09-19 PROCEDURE — 6360000002 HC RX W HCPCS

## 2022-09-19 PROCEDURE — 80048 BASIC METABOLIC PNL TOTAL CA: CPT

## 2022-09-19 PROCEDURE — 87635 SARS-COV-2 COVID-19 AMP PRB: CPT

## 2022-09-19 PROCEDURE — 99253 IP/OBS CNSLTJ NEW/EST LOW 45: CPT | Performed by: NURSE PRACTITIONER

## 2022-09-19 PROCEDURE — 6360000002 HC RX W HCPCS: Performed by: HOSPITALIST

## 2022-09-19 RX ORDER — DIVALPROEX SODIUM 125 MG/1
125 CAPSULE, COATED PELLETS ORAL
Status: DISCONTINUED | OUTPATIENT
Start: 2022-09-19 | End: 2022-09-19 | Stop reason: HOSPADM

## 2022-09-19 RX ORDER — DIVALPROEX SODIUM 125 MG/1
125 CAPSULE, COATED PELLETS ORAL EVERY 8 HOURS PRN
Qty: 21 CAPSULE | Refills: 0 | Status: ON HOLD
Start: 2022-09-19 | End: 2022-10-20 | Stop reason: HOSPADM

## 2022-09-19 RX ORDER — HALOPERIDOL 5 MG/ML
5 INJECTION INTRAMUSCULAR ONCE
Status: COMPLETED | OUTPATIENT
Start: 2022-09-19 | End: 2022-09-19

## 2022-09-19 RX ORDER — DIVALPROEX SODIUM 125 MG/1
125 CAPSULE, COATED PELLETS ORAL EVERY 8 HOURS PRN
Status: DISCONTINUED | OUTPATIENT
Start: 2022-09-19 | End: 2022-09-19 | Stop reason: HOSPADM

## 2022-09-19 RX ORDER — CEFDINIR 300 MG/1
300 CAPSULE ORAL 2 TIMES DAILY
Qty: 10 CAPSULE | Refills: 0 | Status: SHIPPED | OUTPATIENT
Start: 2022-09-19 | End: 2022-09-24

## 2022-09-19 RX ORDER — DIVALPROEX SODIUM 125 MG/1
125 CAPSULE, COATED PELLETS ORAL
Qty: 7 CAPSULE | Refills: 0 | Status: ON HOLD
Start: 2022-09-19 | End: 2022-10-28 | Stop reason: HOSPADM

## 2022-09-19 RX ORDER — POTASSIUM CHLORIDE 20 MEQ/1
40 TABLET, EXTENDED RELEASE ORAL ONCE
Status: DISCONTINUED | OUTPATIENT
Start: 2022-09-19 | End: 2022-09-19 | Stop reason: HOSPADM

## 2022-09-19 RX ADMIN — BUSPIRONE HYDROCHLORIDE 5 MG: 5 TABLET ORAL at 13:55

## 2022-09-19 RX ADMIN — HALOPERIDOL LACTATE 5 MG: 5 INJECTION, SOLUTION INTRAMUSCULAR at 06:23

## 2022-09-19 RX ADMIN — ENOXAPARIN SODIUM 30 MG: 100 INJECTION SUBCUTANEOUS at 08:32

## 2022-09-19 RX ADMIN — Medication 1 TABLET: at 08:32

## 2022-09-19 RX ADMIN — BUSPIRONE HYDROCHLORIDE 5 MG: 5 TABLET ORAL at 08:32

## 2022-09-19 RX ADMIN — MEMANTINE HYDROCHLORIDE 5 MG: 5 TABLET ORAL at 10:33

## 2022-09-19 RX ADMIN — ASPIRIN 81 MG CHEWABLE TABLET 81 MG: 81 TABLET CHEWABLE at 08:32

## 2022-09-19 RX ADMIN — SODIUM CHLORIDE, PRESERVATIVE FREE 10 ML: 5 INJECTION INTRAVENOUS at 08:32

## 2022-09-19 RX ADMIN — PANTOPRAZOLE SODIUM 40 MG: 40 TABLET, DELAYED RELEASE ORAL at 05:58

## 2022-09-19 RX ADMIN — EZETIMIBE 10 MG: 10 TABLET ORAL at 08:32

## 2022-09-19 RX ADMIN — DIVALPROEX SODIUM 250 MG: 125 CAPSULE, COATED PELLETS ORAL at 08:32

## 2022-09-19 NOTE — DISCHARGE INSTR - COC
Continuity of Care Form    Patient Name: [place] Corona   :  1940  MRN:  [id number]    Admit date:  2022  Discharge date:  22    Code Status Order: Full Code   Advance Directives:     Admitting Physician:  Louann Ross MD  PCP: Uriah Cristobal APRN - CNP    Discharging Nurse:   [address] Unit/Room#: 8761/7288-J  Discharging Unit Phone Number: [phone]    Emergency Contact:   Extended Emergency Contact Information  Primary Emergency Contact: Srinivas Ricks Phone: [phone]  Mobile Phone: [phone]  Relation: Grandchild  Secondary Emergency Contact: [facility] Phone: [phone]  Mobile Phone: [phone]  Relation: Child    Past Surgical History:  History reviewed. No pertinent surgical history. Immunization History: There is no immunization history on file for this patient.     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    UTI (urinary tract infection) N39.0       Isolation/Infection:   Isolation            Contact          Patient Infection Status       Infection Onset Added Last Indicated Last Indicated By Review Planned Expiration Resolved Resolved By    ESBL (Extended Spectrum Beta Lactamase) 21 Culture, Urine                Nurse Assessment:  Last Vital Signs: /80   Pulse 68   Temp 97.5 °F (36.4 °C) (Axillary)   Resp 16   Ht 5' 2.99\" (1.6 m)   Wt 90 lb 11.2 oz (41.1 kg)   SpO2 99%   BMI 16.07 kg/m²     Last documented pain score (0-10 scale):    Last Weight:   Wt Readings from Last 1 Encounters:   22 90 lb 11.2 oz (41.1 kg)     Mental Status: disoriented    IV Access:  - None    Nursing Mobility/ADLs:  Walking   Assisted  Transfer  Assisted  Bathing  {[facility] QLWK:280020571}  Dressing  {[facility] KDHX:038324069}  Toileting  Assisted  Feeding  Assisted  Med Admin  Assisted  Med Delivery   whole    Wound Care Documentation and Therapy:  Wound 09/16/22 Ankle Anterior;Left;Outer Scabbed (Active)   Number of days: 3       Wound 09/16/22 Arm Anterior;Left; Inner; Outer; Upper; Lower multiple bruising throughout LEFT ARM due to patient fall at facility (Active)   Number of days: 2       Wound 09/16/22 Arm Anterior;Right; Lower; Upper; Outer; Inner multiple bruising throughout LEFT ARM due to patient fall at facility (Active)   Number of days: 2       Wound 09/16/22 Leg Anterior;Distal;Left;Right;Upper; Lower multiple bruising throughout BL LEG due to patient fall at facility (Active)   Number of days: 2        Elimination:  Continence: Bowel: No  Bladder: No  Urinary Catheter: None   Colostomy/Ileostomy/Ileal Conduit: No       Date of Last BM: 09/18/22    Intake/Output Summary (Last 24 hours) at 9/19/2022 1501  Last data filed at 9/19/2022 0637  Gross per 24 hour   Intake 190 ml   Output --   Net 190 ml     I/O last 3 completed shifts:   In: 240 [P.O.:240]  Out: -     Safety Concerns:     History of Falls (last 30 days)    Impairments/Disabilities:      cognition      Patient's personal belongings (please select all that are sent with patient):  None    RN SIGNATURE:  Electronically signed by Selma Merida RN on 9/19/22 at 4:59 PM EDT    CASE MANAGEMENT/SOCIAL WORK SECTION    Inpatient Status Date: ***    Readmission Risk Assessment Score:  Readmission Risk              Risk of Unplanned Readmission:  15           Discharging to Facility/ Agency   Name:   Address:  Phone:  Fax:    Dialysis Facility (if applicable)   Name:  Address:  Dialysis Schedule:  Phone:  Fax:    / signature: {Esignature:[id number]}    PHYSICIAN SECTION    Nutrition Therapy:  Current Nutrition Therapy:   [address] JANA Diet List:582010657}    Routes of Feeding: {CHP DME Other Feedings:231059624}  Liquids: {Slp liquid thickness:31071}  Daily Fluid Restriction: {CHP DME Yes amt example:820858664}  Last Modified Barium Swallow with Video (Video Swallowing Test): {Done Not Done WATH:529611212}    Treatments at the Time of Hospital Discharge:   Respiratory Treatments: ***  Oxygen Therapy:  {Therapy; copd oxygen:50275}  Ventilator:    {[facility] Vent WJBT:498532750}    Rehab Therapies: {THERAPEUTIC INTERVENTION:2376905940}  Weight Bearing Status/Restrictions: {[facility] Weight Bearin}  Other Medical Equipment (for information only, NOT a DME order):  {EQUIPMENT:882974103}  Other Treatments: ***    Prognosis: Fair    Condition at Discharge: Stable    Rehab Potential (if transferring to Rehab): Guarded    Recommended Labs or Other Treatments After Discharge:   - cefdinir for 5 more days  - depokote dose was increased; continue with increased dose for one week, then resume original home dose after.  - pt needs to be evaluated by psychiatrist at facility  - lab work in one week  - Standard Delirium precautions:  o Redirect / reorient / reassure the patient  o Early mobilization (please allow patient to walk halls with assistance if needed)   o Reduce noise and provide adequate daytime light in the room; pascual-side room preferable if available  o Prevent sleep deprivation  o Minimize use of anticholinergic drugs, benzodiazepines, and narcotics  o Use of eyeglasses and hearing aids  o Treat volume depletion  o Bowel regimens  o Avoid lines or catheters if possible  o Monitor for unintentional self-harm  o Assess fall risk     Physician Certification: I certify the above information and transfer of Ashanti Romero  is necessary for the continuing treatment of the diagnosis listed and that she requires Andrés Trinidad for greater 30 days.      Update Admission H&P: No change in H&P    PHYSICIAN SIGNATURE:  Electronically signed by Nury Conway MD on 9/19/22 at 3:02 PM EDT

## 2022-09-19 NOTE — PROGRESS NOTES
Physical Therapy  Att. Pt has sitter and reports pt very confused and now sleeping. Will try back as schedule allows and py's tolerance.

## 2022-09-19 NOTE — PROGRESS NOTES
V2.0  [facility] Hospitalist Progress Note      Name:  Paz Sierra /Age/Sex: 1940  (80 y.o. female)   MRN & CSN:  [id number] & [id number] Encounter Date/Time: 2022 1:25 PM EDT    Location:  [address] [place] [address]-A PCP: Grant Mark, [address] [facility] Day: 4      Subjective:     Chief Complaint: Fall (Head injury, on baby aspirin, more altered than normal per facility, unsure of LOC/)    Patient has been having a sitter at bedside. She was agitated overnight pulling line, haldol was ordered. Patient seen and examined at bedside. Unable to obtain ROS given her dementia. Patient is awake answering YES to all questions. Assessment and Plan:   Paz Sierra is a 80 y.o. female presents with AMS      Plan:  #acute metabolic encephalopathy  -Multifactorial secondary to dementia, malnutrition, UTI. No sign of other cause.  -Continue supportive measures.  -will discuss with nursing home if pt can be discharged with her current status  - consult psych to help with meds if indicated    #UTI  -UA shows yellow clear urine with 40 ketones large leukocyte esterase positive nitrite, 2441 WBC per high-power field  Urine Cx grew E. Coli pansinsintive  -IV Rocephin ; discharge on cefdinir. #Hypernatremia  Na 145; likely 2/2 poor water intake. S/p 0.45D5  - monitor  - encourage oral intake as possible    #History of fall  -CT head shows no acute abnormality. -CT cervical spine also shows no acute process. -PT OT for gait training    #Severe protein calorie malnutrition  -Optimize p.o. diet    #Increased troponins  Likely demand. Repeat troponin trending down . -Twelve-lead ECG shows normal sinus rhythm. Diet ADULT DIET;  Regular   DVT Prophylaxis [x] Lovenox, []  Heparin, [] SCDs, [] Ambulation,  [] Eliquis, [] Xarelto  [] Coumadin   Code Status Full Code   Disposition From: NH  Expected Disposition: NH  Estimated Date of Discharge: pending placement clearance  Patient requires continued admission due to medically stable;    Surrogate Decision Maker/ POA           Review of Systems:    Review of Systems    See above    Objective: Intake/Output Summary (Last 24 hours) at 9/19/2022 1101  Last data filed at 9/19/2022 0637  Gross per 24 hour   Intake 190 ml   Output --   Net 190 ml        Vitals:   Vitals:    09/19/22 0744   BP: 119/80   Pulse: 68   Resp: 16   Temp: 97.5 °F (36.4 °C)   SpO2: 99%       Physical Exam:     General: NAD  Eyes: EOMI  ENT: neck supple  Cardiovascular: Regular rate. Respiratory: Clear to auscultation  Gastrointestinal: Soft, non tender  Genitourinary: no suprapubic tenderness  Musculoskeletal: No edema  Skin: warm, dry  Neuro: Alert, oriented X1, confused, agitated not following commands. Psych: Mood appropriate.      Medications:   Medications:    ezetimibe  10 mg Oral Daily    aspirin  81 mg Oral Daily    busPIRone  5 mg Oral TID    divalproex  250 mg Oral Daily    divalproex  500 mg Oral Nightly    doxepin  10 mg Oral Nightly    memantine  5 mg Oral Daily    pantoprazole  40 mg Oral QAM AC    sodium chloride flush  5-40 mL IntraVENous 2 times per day    cefTRIAXone (ROCEPHIN) IV  1,000 mg IntraVENous Q24H    enoxaparin  30 mg SubCUTAneous Daily    calcium-cholecalciferol  1 tablet Oral Daily      Infusions:    sodium chloride 25 mL/hr (09/17/22 2203)     PRN Meds: sodium chloride flush, 5-40 mL, PRN  sodium chloride, , PRN  ondansetron, 4 mg, Q8H PRN   Or  ondansetron, 4 mg, Q6H PRN  polyethylene glycol, 17 g, Daily PRN  acetaminophen, 650 mg, Q6H PRN   Or  acetaminophen, 650 mg, Q6H PRN      Labs      Recent Results (from the past 24 hour(s))   POCT Glucose    Collection Time: 09/18/22 11:06 AM   Result Value Ref Range    POC Glucose 74 70 - 99 MG/DL        Imaging/Diagnostics Last 24 Hours   CT HEAD WO CONTRAST    Result Date: 9/16/2022  EXAMINATION: CT OF THE HEAD WITHOUT CONTRAST  9/16/2022 3:44 pm TECHNIQUE: CT of the head was performed without the administration of intravenous contrast. Automated exposure control, iterative reconstruction, and/or weight based adjustment of the mA/kV was utilized to reduce the radiation dose to as low as reasonably achievable. COMPARISON: 03/07/2021, 06/08/2020 and CTA head 06/08/2020 HISTORY: ORDERING SYSTEM PROVIDED HISTORY: fall, head injury TECHNOLOGIST PROVIDED HISTORY: Reason for exam:->fall, head injury Has a \"code stroke\" or \"stroke alert\" been called? ->No Decision Support Exception - unselect if not a suspected or confirmed emergency medical condition->Emergency Medical Condition (MA) Reason for Exam: fall, head injury FINDINGS: BRAIN/VENTRICLES: There is age-appropriate generalized atrophy and there is patchy periventricular and subcortical white matter low attenuation that is unchanged and nonspecific but most consistent with chronic small vessel ischemia. There is an unchanged 1.2 cm A-comm aneurysm (axial image 23). No evidence of acute hemorrhage, mass or extra-axial fluid collection. ORBITS: The visualized portion of the orbits demonstrate no acute abnormality. SINUSES: The visualized paranasal sinuses and mastoid air cells demonstrate no acute abnormality. SOFT TISSUES/SKULL:  No acute abnormality of the visualized skull or soft tissues. No acute intracranial abnormality. Generalized atrophy and chronic small vessel ischemic white matter disease. 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 CT CERVICAL SPINE WO CONTRAST    Result Date: 9/16/2022  EXAMINATION: CT OF THE CERVICAL SPINE WITHOUT CONTRAST 9/16/2022 3:45 pm TECHNIQUE: CT of the cervical spine was performed without the administration of intravenous contrast. Multiplanar reformatted images are provided for review. Automated exposure control, iterative reconstruction, and/or weight based adjustment of the mA/kV was utilized to reduce the radiation dose to as low as reasonably achievable. COMPARISON: None.  HISTORY: ORDERING SYSTEM PROVIDED HISTORY: neck pain TECHNOLOGIST PROVIDED HISTORY: Reason for exam:->neck pain Decision Support Exception - unselect if not a suspected or confirmed emergency medical condition->Emergency Medical Condition (MA) Reason for Exam: neck pain FINDINGS: BONES/ALIGNMENT: There is a minimal degenerative anterolisthesis of C4 on C5. Vertebral body alignment is otherwise unremarkable. Cervical vertebral body heights are. Facet joints are normally aligned. There is no acute fracture or traumatic malalignment. DEGENERATIVE CHANGES: There is moderate multilevel facet arthropathy. Moderate degenerative disc disease disc space narrowing and spondylosis at C5-C6. SOFT TISSUES: There is no prevertebral soft tissue swelling. No acute fracture or traumatic malalignment of the cervical spine. XR CHEST PORTABLE    Result Date: 9/16/2022  EXAMINATION: ONE XRAY VIEW OF THE CHEST 9/16/2022 7:52 pm COMPARISON: Chest x-ray March 7, 2021 HISTORY: ORDERING SYSTEM PROVIDED HISTORY: fall, elevated troponin TECHNOLOGIST PROVIDED HISTORY: Reason for exam:->fall, elevated troponin Reason for Exam: fall, elevated troponin Additional signs and symptoms: none FINDINGS: Cardiac silhouette is stable. No focal consolidation, pleural effusion, or pneumothorax. Osseous structures appear intact. Postoperative changes of cholecystectomy. 1. No acute cardiopulmonary process identified.        Electronically signed by Meet Duncan MD on 9/19/2022 at 11:01 AM

## 2022-09-19 NOTE — CARE COORDINATION
LSW call to [facility]. Patient can return when medically stable. Message to the attending doctor to discharge the patient. LSW noted that patient has a discharge. Call to [facility] and transport time is 6:30 pm to [facility]. Charge RN notified.

## 2022-09-19 NOTE — PROGRESS NOTES
Physician Progress Note      PATIENT:               Lencho Morin  CSN #:                  [id number]  :                       1940  ADMIT DATE:       2022 2:33 PM  [address] [place] DATE:  RESPONDING  PROVIDER #:        Nury Conway MD          QUERY TEXT:    Pt admitted with metabolic encephalopathy. Pt noted to have prior CVA. If   possible, please document in progress notes and discharge summary if you are   evaluating and/or treating any of the following: The medical record reflects the following:  Risk Factors: Hx CVA  Clinical Indicators:  progress note \"acute metabolic encephalopathy   -Multifactorial secondary to dementia, malnutrition, UTI and CVA. No sign of   other cause. \", head CT on  \"No acute intracranial abnormality. \"  Treatment: Head CT, psych consult. Thank you,  CHRISTOPHE HymanN, RN, CDS  [phone]  Options provided:  -- Metabolic encephalopathy is multifactorial secondary to dementia,   malnutrition, UTI and a sequela of prior CVA  -- Metabolic encephalopathy is multifactorial secondary to dementia, malunion,   UTI and acute CVA  -- Other - I will add my own diagnosis  -- Disagree - Not applicable / Not valid  -- Disagree - Clinically unable to determine / Unknown  -- Refer to Clinical Documentation Reviewer    PROVIDER RESPONSE TEXT:    2/ UTI, advanced dementia.     Query created by: Sundeep Garcia on 2022 2:14 PM      Electronically signed by:  Nury Conway MD 2022 2:38 PM

## 2022-09-20 NOTE — DISCHARGE SUMMARY
Discharge Summary    Name:  Paige Castle /Age/Sex: 1940  (80 y.o. female)   MRN & CSN:  [id number] & [id number] Admission Date/Time: 2022  2:33 PM   Attending:  No att. providers found Discharging Physician: Amelia Montemayor MD     Hospital Course: Paige Castle is a 80 y.o.  female  who presents with altered mental status. The following problems have been addressed during this hospitalization. #acute metabolic encephalopathy  -Multifactorial secondary to dementia, malnutrition, UTI. No sign of other cause. -Pt has been requiring sitter due to agitation, pulling IV, trying to get out of bed. He was given one dose of Zyprexa 2.5 mg and 5 mg haldol.   - Psych was consulted; recommendation was to increase Depakote dose for one week until pt is evaluated by psychiatry at the nursing home  - pt was at baseline on day of discharge. Medically stable    #UTI  -UA shows yellow clear urine with 40 ketones large leukocyte esterase positive nitrite, 2441 WBC per high-power field  Urine Cx grew E. Coli pan-sensitive  -Received IV Rocephin ; discharge on cefdinir 5 days. #Hypernatremia  Around baseline 145-146; likely 2/2 poor water intake. S/p 0.45D5  - encourage oral intake as possible    #History of fall  -CT head shows no acute abnormality. -CT cervical spine also shows no acute process. #Severe protein calorie malnutrition  -Optimize p.o. diet    #Increased troponins  Likely demand. Repeat troponin trended down . -Twelve-lead ECG shows normal sinus rhythm. Physical Exam  General: NAD  Eyes: EOMI  ENT: neck supple  Cardiovascular: Regular rate. Respiratory: Clear to auscultation  Gastrointestinal: Soft, non tender  Genitourinary: no suprapubic tenderness  Musculoskeletal: No edema  Skin: warm, dry  Neuro: Alert, oriented X1, confused, agitated not following commands. Psych: Mood appropriate.         The patient expressed appropriate understanding of and agreement with the discharge recommendations, medications, and plan. Consults this admission:  IP CONSULT TO HOSPITALIST  IP CONSULT TO PSYCHIATRY      Discharge Instruction:   Handoff to PCP:   - Follow up BMP  - needs psychiatry follow up    Follow up appointments:   Primary care physician: KALI Prater CNP      Diet:  regular diet   Activity: activity as tolerated  Disposition: Discharged to:   []Home, []C, [x]SNF, []Acute Rehab, []Hospice   Condition on discharge: Stable    Discharge Medications:        Medication List        START taking these medications      cefdinir 300 MG capsule  Commonly known as: OMNICEF  Take 1 capsule by mouth 2 times daily for 5 days            CHANGE how you take these medications      * divalproex 125 MG capsule  Commonly known as: DEPAKOTE SPRINKLE  Take 4 capsules by mouth nightly  What changed: Another medication with the same name was added. Make sure you understand how and when to take each. * divalproex 125 MG capsule  Commonly known as: DEPAKOTE SPRINKLE  Take 2 capsules by mouth daily  What changed: Another medication with the same name was added. Make sure you understand how and when to take each. * divalproex 125 MG capsule  Commonly known as: DEPAKOTE SPRINKLE  Take 1 capsule by mouth every 8 hours as needed (for agitation)  What changed: You were already taking a medication with the same name, and this prescription was added. Make sure you understand how and when to take each. * divalproex 125 MG capsule  Commonly known as: DEPAKOTE SPRINKLE  Take 1 capsule by mouth Daily with supper for 7 days  What changed: You were already taking a medication with the same name, and this prescription was added. Make sure you understand how and when to take each. * This list has 4 medication(s) that are the same as other medications prescribed for you. Read the directions carefully, and ask your doctor or other care provider to review them with you. CONTINUE taking these medications      aspirin 81 MG chewable tablet  Take 1 tablet by mouth daily     busPIRone 5 MG tablet  Commonly known as: BUSPAR  Take 1 tablet by mouth 3 times daily     Calcium Carbonate-Vitamin D 500-125 MG-UNIT Tabs     doxepin 10 MG capsule  Commonly known as: SINEQUAN  Take 1 capsule by mouth nightly     ezetimibe 10 MG tablet  Commonly known as: ZETIA     memantine 5 MG tablet  Commonly known as: NAMENDA  Take 1 tablet by mouth daily     omeprazole 40 MG delayed release capsule  Commonly known as: PRILOSEC               Where to Get Your Medications        These medications were sent to [address] [place] [address] (), [place] - [address] [facility] [phone] - F [phone] [address], [facility] () [facility] [address]      Phone: [phone]   cefdinir 300 MG capsule       Information about where to get these medications is not yet available    Ask your nurse or doctor about these medications  divalproex 125 MG capsule  divalproex 125 MG capsule         Objective Findings at Discharge:       BMP/CBC  Recent Labs     09/17/22  0932 09/18/22  0342 09/19/22  1321    146* 145   K 3.1* 3.6 3.1*    110 109   CO2 26 26 26   BUN 15 10 9   CREATININE 0.5* 0.5* 0.5*   WBC 9.7  --  5.0   HCT 39.7  --  39.9     --  192       IMAGING:      Additional Information: Patient seen and examined day of discharge.  For more information regarding patient's care please contact [facility] records [phone]    Discharge Time of 35 minutes    Electronically signed by Shirin Díaz MD on 9/20/2022 at 1:44 AM

## 2022-10-14 ENCOUNTER — APPOINTMENT (OUTPATIENT)
Dept: CT IMAGING | Age: 82
DRG: 884 | End: 2022-10-14
Payer: COMMERCIAL

## 2022-10-14 ENCOUNTER — HOSPITAL ENCOUNTER (EMERGENCY)
Age: 82
Discharge: HOME | DRG: 884 | End: 2022-10-14
Attending: EMERGENCY MEDICINE
Payer: COMMERCIAL

## 2022-10-14 VITALS
OXYGEN SATURATION: 95 % | TEMPERATURE: 96.9 F | SYSTOLIC BLOOD PRESSURE: 140 MMHG | RESPIRATION RATE: 20 BRPM | DIASTOLIC BLOOD PRESSURE: 71 MMHG | HEART RATE: 89 BPM

## 2022-10-14 DIAGNOSIS — M89.9 BONE LESION: ICD-10-CM
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
## 2022-10-14 DIAGNOSIS — S09.90XA CLOSED HEAD INJURY, INITIAL ENCOUNTER: Primary | ICD-10-CM

## 2022-10-14 DIAGNOSIS — S32.000A COMPRESSION FRACTURE OF LUMBAR VERTEBRA, UNSPECIFIED LUMBAR VERTEBRAL LEVEL, INITIAL ENCOUNTER (HCC): ICD-10-CM

## 2022-10-14 LAB
ALBUMIN SERPL-MCNC: 3.8 GM/DL (ref 3.4–5)
ALP BLD-CCNC: 97 IU/L (ref 40–129)
ALT SERPL-CCNC: 12 U/L (ref 10–40)
ANION GAP SERPL CALCULATED.3IONS-SCNC: 7 MMOL/L (ref 4–16)
APTT: 39.3 SECONDS (ref 25.1–37.1)
AST SERPL-CCNC: 25 IU/L (ref 15–37)
BASOPHILS ABSOLUTE: 0 K/CU MM
BASOPHILS RELATIVE PERCENT: 0.9 % (ref 0–1)
BILIRUB SERPL-MCNC: 0.3 MG/DL (ref 0–1)
BUN BLDV-MCNC: 15 MG/DL (ref 6–23)
CALCIUM SERPL-MCNC: 9.4 MG/DL (ref 8.3–10.6)
CHLORIDE BLD-SCNC: 104 MMOL/L (ref 99–110)
CO2: 29 MMOL/L (ref 21–32)
CREAT SERPL-MCNC: 0.7 MG/DL (ref 0.6–1.1)
DIFFERENTIAL TYPE: ABNORMAL
EOSINOPHILS ABSOLUTE: 0.1 K/CU MM
EOSINOPHILS RELATIVE PERCENT: 3.1 % (ref 0–3)
GFR AFRICAN AMERICAN: >60 ML/MIN/1.73M2
GFR NON-AFRICAN AMERICAN: >60 ML/MIN/1.73M2
GLUCOSE BLD-MCNC: 87 MG/DL (ref 70–99)
HCT VFR BLD CALC: 37.8 % (ref 37–47)
HEMOGLOBIN: 11.6 GM/DL (ref 12.5–16)
IMMATURE NEUTROPHIL %: 0.3 % (ref 0–0.43)
INR BLD: 0.94 INDEX
LIPASE: 31 IU/L (ref 13–60)
LYMPHOCYTES ABSOLUTE: 1.1 K/CU MM
LYMPHOCYTES RELATIVE PERCENT: 32.8 % (ref 24–44)
MCH RBC QN AUTO: 28.4 PG (ref 27–31)
MCHC RBC AUTO-ENTMCNC: 30.7 % (ref 32–36)
MCV RBC AUTO: 92.6 FL (ref 78–100)
MONOCYTES ABSOLUTE: 0.6 K/CU MM
MONOCYTES RELATIVE PERCENT: 18.3 % (ref 0–4)
NUCLEATED RBC %: 0 %
PDW BLD-RTO: 17.4 % (ref 11.7–14.9)
PLATELET # BLD: 175 K/CU MM (ref 140–440)
PMV BLD AUTO: 8.9 FL (ref 7.5–11.1)
POTASSIUM SERPL-SCNC: 3.7 MMOL/L (ref 3.5–5.1)
PROTHROMBIN TIME: 12.1 SECONDS (ref 11.7–14.5)
RBC # BLD: 4.08 M/CU MM (ref 4.2–5.4)
SEGMENTED NEUTROPHILS ABSOLUTE COUNT: 1.4 K/CU MM
SEGMENTED NEUTROPHILS RELATIVE PERCENT: 44.6 % (ref 36–66)
SODIUM BLD-SCNC: 140 MMOL/L (ref 135–145)
TOTAL IMMATURE NEUTOROPHIL: 0.01 K/CU MM
TOTAL NUCLEATED RBC: 0 K/CU MM
TOTAL PROTEIN: 7.4 GM/DL (ref 6.4–8.2)
WBC # BLD: 3.2 K/CU MM (ref 4–10.5)

## 2022-10-14 PROCEDURE — 71260 CT THORAX DX C+: CPT

## 2022-10-14 PROCEDURE — 72125 CT NECK SPINE W/O DYE: CPT

## 2022-10-14 PROCEDURE — 83690 ASSAY OF LIPASE: CPT

## 2022-10-14 PROCEDURE — 6360000002 HC RX W HCPCS: Performed by: EMERGENCY MEDICINE

## 2022-10-14 PROCEDURE — 2500000003 HC RX 250 WO HCPCS: Performed by: EMERGENCY MEDICINE

## 2022-10-14 PROCEDURE — 80053 COMPREHEN METABOLIC PANEL: CPT

## 2022-10-14 PROCEDURE — 76376 3D RENDER W/INTRP POSTPROCES: CPT

## 2022-10-14 PROCEDURE — 85610 PROTHROMBIN TIME: CPT

## 2022-10-14 PROCEDURE — 85025 COMPLETE CBC W/AUTO DIFF WBC: CPT

## 2022-10-14 PROCEDURE — 90715 TDAP VACCINE 7 YRS/> IM: CPT | Performed by: EMERGENCY MEDICINE

## 2022-10-14 PROCEDURE — 6360000004 HC RX CONTRAST MEDICATION: Performed by: EMERGENCY MEDICINE

## 2022-10-14 PROCEDURE — 99285 EMERGENCY DEPT VISIT HI MDM: CPT

## 2022-10-14 PROCEDURE — 90471 IMMUNIZATION ADMIN: CPT | Performed by: EMERGENCY MEDICINE

## 2022-10-14 PROCEDURE — 74177 CT ABD & PELVIS W/CONTRAST: CPT

## 2022-10-14 PROCEDURE — 70450 CT HEAD/BRAIN W/O DYE: CPT

## 2022-10-14 PROCEDURE — 96372 THER/PROPH/DIAG INJ SC/IM: CPT

## 2022-10-14 PROCEDURE — 85730 THROMBOPLASTIN TIME PARTIAL: CPT

## 2022-10-14 RX ORDER — LIDOCAINE HYDROCHLORIDE 20 MG/ML
5 INJECTION, SOLUTION INFILTRATION; PERINEURAL ONCE
Status: COMPLETED | OUTPATIENT
Start: 2022-10-14 | End: 2022-10-14

## 2022-10-14 RX ORDER — LIDOCAINE HYDROCHLORIDE AND EPINEPHRINE BITARTRATE 20; .01 MG/ML; MG/ML
20 INJECTION, SOLUTION SUBCUTANEOUS ONCE
Status: DISCONTINUED | OUTPATIENT
Start: 2022-10-14 | End: 2022-10-14

## 2022-10-14 RX ORDER — 0.9 % SODIUM CHLORIDE 0.9 %
10 VIAL (ML) INJECTION
Status: COMPLETED | OUTPATIENT
Start: 2022-10-14 | End: 2022-10-14

## 2022-10-14 RX ADMIN — IOPAMIDOL 75 ML: 755 INJECTION, SOLUTION INTRAVENOUS at 07:52

## 2022-10-14 RX ADMIN — Medication 10 ML: at 07:52

## 2022-10-14 RX ADMIN — LIDOCAINE HYDROCHLORIDE 5 ML: 20 INJECTION, SOLUTION INFILTRATION; PERINEURAL at 09:35

## 2022-10-14 RX ADMIN — TETANUS TOXOID, REDUCED DIPHTHERIA TOXOID AND ACELLULAR PERTUSSIS VACCINE, ADSORBED 0.5 ML: 5; 2.5; 8; 8; 2.5 SUSPENSION INTRAMUSCULAR at 09:31

## 2022-10-14 ASSESSMENT — PAIN - FUNCTIONAL ASSESSMENT: PAIN_FUNCTIONAL_ASSESSMENT: NONE - DENIES PAIN

## 2022-10-14 NOTE — ED PROVIDER NOTES
Triage Chief Complaint:   Fall    [place]:  Ismael Drake is a 80 y.o. female that presents after being found down after a fall. Patient is from [address] [place] [address] and she had an unwitnessed fall out of bed. Patient was found down on the ground with a head injury and some bleeding from the back of the head. Patient is with significant dementia and expressive aphasia limiting history. Patient on arrival is able to tell me her name but otherwise denies any complaints and is unable to provide any other further history. Per chart review patient is on aspirin for anticoagulation. ROS:  History is very limited given the above. 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 No family history on file. Social History     Socioeconomic History    Marital status: Single     Spouse name: Not on file    Number of children: Not on file    Years of education: Not on file    Highest education level: Not on file   Occupational History    Not on file   Tobacco Use    Smoking status: Former     Types: Cigarettes    Smokeless tobacco: Never   Substance and Sexual Activity    Alcohol use: Not Currently    Drug use: Never    Sexual activity: Not Currently   Other Topics Concern    Not on file   Social History Narrative    Not on file     Social Determinants of Health     Financial Resource Strain: Not on file   Food Insecurity: Not on file   Transportation Needs: Not on file   Physical Activity: Not on file   Stress: Not on file   Social Connections: Not on file   Intimate Partner Violence: Not on file   Housing Stability: Not on file     No current facility-administered medications for this encounter.      Current Outpatient Medications   Medication Sig Dispense Refill    divalproex (DEPAKOTE SPRINKLE) 125 MG capsule Take 1 capsule by mouth every 8 hours as needed (for agitation) 21 capsule 0    divalproex (DEPAKOTE SPRINKLE) 125 MG capsule Take 1 capsule by mouth Daily with supper for 7 days 7 capsule 0    busPIRone (BUSPAR) 5 MG tablet Take 1 tablet by mouth 3 times daily 90 tablet 1    divalproex (DEPAKOTE SPRINKLE) 125 MG capsule Take 2 capsules by mouth daily 60 capsule 1    divalproex (DEPAKOTE SPRINKLE) 125 MG capsule Take 4 capsules by mouth nightly 120 capsule 1    doxepin (SINEQUAN) 10 MG capsule Take 1 capsule by mouth nightly 30 capsule 1    memantine (NAMENDA) 5 MG tablet Take 1 tablet by mouth daily 30 tablet 1    aspirin 81 MG chewable tablet Take 1 tablet by mouth daily 30 tablet 3    Calcium Carbonate-Vitamin D 500-125 MG-UNIT TABS Take 1 tablet by mouth daily      ezetimibe (ZETIA) 10 MG tablet Take 10 mg by mouth daily      omeprazole (PRILOSEC) 40 MG delayed release capsule Take 40 mg by mouth daily       Allergies   Allergen Reactions    Ativan [Lorazepam] Other (See Comments)     Psychotic Reaction/Combative    Vicodin [Hydrocodone-Acetaminophen] Other (See Comments)     Psychotic reaction    Valium [Diazepam] Other (See Comments)     Ineffective       Nursing Notes Reviewed    Physical Exam:  ED Triage Vitals [10/14/22 0717]   Enc Vitals Group      /82      Heart Rate 78      Resp 22      Temp 96.9 °F (36.1 °C)      Temp Source Axillary      SpO2 92 %      Weight       Height       Head Circumference       Peak Flow       Pain Score       Pain Loc       Pain Edu? Excl. in [address]? My pulse ox interpretation is - 92% on room air. General appearance:  No acute distress. Skin:  Warm. Dry. There is obvious healing ecchymosis and cephalhematoma to the right frontal area from prior fall. Patient does have a occipital cephalhematoma with some venous oozing present. Eye:  Extraocular movements intact. Ears, nose, mouth and throat:  No fitzgerald sign or raccoon eyes. Midface is stable. No dental malocclusion. Neck:  Trachea midline. No midline bony cervical tenderness. Extremity:  No swelling. Normal ROM. No gross deformity ×4 extremities. Extremities are nontender. Heart:  Regular rate and rhythm, normal S1 & S2, no extra heart sounds. Perfusion:  Intact. Strong symmetric bilateral radial and PT pulses. Respiratory:  Lungs clear to auscultation bilaterally. Respirations nonlabored. Chest wall is nontender. No crepitance. Abdominal:  Normal bowel sounds. Soft. Nontender. Non distended. Very thin. Back:  No midline bony TLS tenderness or step-off. Neurological:  Alert and oriented to person. Expressive aphasia is present. Significant dementia. Sensation intact to light touch to distal upper/lower extremities; 5/5 and symmetric  and dorsi/plantar flexion            Psychiatric:  Deferred.     I have reviewed and interpreted all of the currently available lab results from this visit (if applicable):  Results for orders placed or performed during the hospital encounter of 10/14/22   CBC with Auto Differential   Result Value Ref Range    WBC 3.2 (L) 4.0 - 10.5 K/CU MM    RBC 4.08 (L) 4.2 - 5.4 M/CU MM    Hemoglobin 11.6 (L) 12.5 - 16.0 GM/DL    Hematocrit 37.8 37 - 47 %    MCV 92.6 78 - 100 FL    MCH 28.4 27 - 31 PG    MCHC 30.7 (L) 32.0 - 36.0 %    RDW 17.4 (H) 11.7 - 14.9 %    Platelets 580 475 - 035 K/CU MM    MPV 8.9 7.5 - 11.1 FL    Differential Type AUTOMATED DIFFERENTIAL     Segs Relative 44.6 36 - 66 %    Lymphocytes % 32.8 24 - 44 %    Monocytes % 18.3 (H) 0 - 4 %    Eosinophils % 3.1 (H) 0 - 3 %    Basophils % 0.9 0 - 1 %    Segs Absolute 1.4 K/CU MM    Lymphocytes Absolute 1.1 K/CU MM    Monocytes Absolute 0.6 K/CU MM    Eosinophils Absolute 0.1 K/CU MM    Basophils Absolute 0.0 K/CU MM    Nucleated RBC % 0.0 %    Total Nucleated RBC 0.0 K/CU MM    Total Immature Neutrophil 0.01 K/CU MM    Immature Neutrophil % 0.3 0 - 0.43 % Comprehensive Metabolic Panel   Result Value Ref Range    Sodium 140 135 - 145 MMOL/L    Potassium 3.7 3.5 - 5.1 MMOL/L    Chloride 104 99 - 110 mMol/L    CO2 29 21 - 32 MMOL/L    BUN 15 6 - 23 MG/DL    Creatinine 0.7 0.6 - 1.1 MG/DL    Glucose 87 70 - 99 MG/DL    Calcium 9.4 8.3 - 10.6 MG/DL    Albumin 3.8 3.4 - 5.0 GM/DL    Total Protein 7.4 6.4 - 8.2 GM/DL    Total Bilirubin 0.3 0.0 - 1.0 MG/DL    ALT 12 10 - 40 U/L    AST 25 15 - 37 IU/L    Alkaline Phosphatase 97 40 - 129 IU/L    GFR Non-African American >60 >60 mL/min/1.73m2    GFR African American >60 >60 mL/min/1.73m2    Anion Gap 7 4 - 16   Protime/INR & PTT   Result Value Ref Range    Protime 12.1 11.7 - 14.5 SECONDS    INR 0.94 INDEX    aPTT 39.3 (H) 25.1 - 37.1 SECONDS   Lipase   Result Value Ref Range    Lipase 31 13 - 60 IU/L      Radiographs (if obtained):  [] The following radiograph was interpreted by myself in the absence of a radiologist:   [x] Radiologist's Report Reviewed:  CT LUMBAR RECONSTRUCTION WO POST PROCESS   Preliminary Result   Multiple compression fractures identified at T9, T11, T12, L2, L3, and at L4. The most severely affected level is at T12 with loss of 80% of vertebral body   height. These fractures are likely old though MRI of the lumbar spine may be   considered for confirmation. Sclerotic lesion within the left iliac bone new left SI joint. While a   benign fibro-osseous lesion is suspected, metastasis is not excluded. Follow-up bone scan may be considered non emergently. RECOMMENDATIONS:   Unavailable         CT THORACIC RECONSTRUCTION WO POST PROCESS   Final Result   1. Subacute appearing T9 burst fracture with mild to moderate height loss. Posterior wall fragments are retropulsed by up to 0.3 cm with no significant   associated spinal canal stenosis. Of note, a chronic appearing T11 superior   endplate compression deformity and a chronic appearing T12 burst deformity   are also present.    2. Subacute appearing fractures of the left 3rd rib and right 9th and 11th   ribs. 3. Subcutaneous contusion and hematoma lateral to the right hip. Otherwise   no evident acute injury of the abdomen or pelvis. 4. A 0.8 cm solid nodule in the left lower lobe has indeterminate features   but is more suggestive of a a neoplasm than a sequela of an infectious or   inflammatory process. Recommend follow-up as below. Of note, the below   recommendations would not apply in the setting prior malignancy not reported   in the electronic medical record. 5. Moderate extrahepatic and mild intrahepatic biliary dilation are likely   related to cholecystectomy and age. However, there is also mild dilation of   the main pancreatic duct with no definite visualization of an obstructing   ampullary or pancreatic lesion. Consider pancreas protocol abdomen MRI   (which includes MRCP) if there are clinical findings of cholestasis. 6. Large stool volume suggestive of constipation. 7. Trace bilateral pleural effusions, trace ascites, and minimal anasarca   suggest volume overload. 8. Incidental findings as above for which no dedicated follow-up is   recommended. RECOMMENDATIONS:   Incidental Pulmonary Nodules on CT      - Solid nodules, single, low risk      >8 mm:  Consider CT 3 months, PET/CT, or tissue sampling      - Solid nodules, single, high risk      >8 mm:  Consider CT 3 months, PET/CT, or tissue sampling      Reference:  AttilaMahon et al.  Guidelines for management of incidental   pulmonary nodules detected on CT images: From the Fleischner Society 2017. Radiology [id number]. CT ABDOMEN PELVIS W IV CONTRAST Additional Contrast? None   Final Result   1. Subacute appearing T9 burst fracture with mild to moderate height loss. Posterior wall fragments are retropulsed by up to 0.3 cm with no significant   associated spinal canal stenosis.   Of note, a chronic appearing T11 superior   endplate compression deformity and a chronic appearing T12 burst deformity   are also present. 2. Subacute appearing fractures of the left 3rd rib and right 9th and 11th   ribs. 3. Subcutaneous contusion and hematoma lateral to the right hip. Otherwise   no evident acute injury of the abdomen or pelvis. 4. A 0.8 cm solid nodule in the left lower lobe has indeterminate features   but is more suggestive of a a neoplasm than a sequela of an infectious or   inflammatory process. Recommend follow-up as below. Of note, the below   recommendations would not apply in the setting prior malignancy not reported   in the electronic medical record. 5. Moderate extrahepatic and mild intrahepatic biliary dilation are likely   related to cholecystectomy and age. However, there is also mild dilation of   the main pancreatic duct with no definite visualization of an obstructing   ampullary or pancreatic lesion. Consider pancreas protocol abdomen MRI   (which includes MRCP) if there are clinical findings of cholestasis. 6. Large stool volume suggestive of constipation. 7. Trace bilateral pleural effusions, trace ascites, and minimal anasarca   suggest volume overload. 8. Incidental findings as above for which no dedicated follow-up is   recommended. RECOMMENDATIONS:   Incidental Pulmonary Nodules on CT      - Solid nodules, single, low risk      >8 mm:  Consider CT 3 months, PET/CT, or tissue sampling      - Solid nodules, single, high risk      >8 mm:  Consider CT 3 months, PET/CT, or tissue sampling      Reference:  Maehomamie et al.  Guidelines for management of incidental   pulmonary nodules detected on CT images: From the Fleischner Society 2017. Radiology [id number]. CT CHEST W CONTRAST   Final Result   1. Subacute appearing T9 burst fracture with mild to moderate height loss. Posterior wall fragments are retropulsed by up to 0.3 cm with no significant   associated spinal canal stenosis.   Of note, a chronic appearing T11 superior   endplate compression deformity and a chronic appearing T12 burst deformity   are also present. 2. Subacute appearing fractures of the left 3rd rib and right 9th and 11th   ribs. 3. Subcutaneous contusion and hematoma lateral to the right hip. Otherwise   no evident acute injury of the abdomen or pelvis. 4. A 0.8 cm solid nodule in the left lower lobe has indeterminate features   but is more suggestive of a a neoplasm than a sequela of an infectious or   inflammatory process. Recommend follow-up as below. Of note, the below   recommendations would not apply in the setting prior malignancy not reported   in the electronic medical record. 5. Moderate extrahepatic and mild intrahepatic biliary dilation are likely   related to cholecystectomy and age. However, there is also mild dilation of   the main pancreatic duct with no definite visualization of an obstructing   ampullary or pancreatic lesion. Consider pancreas protocol abdomen MRI   (which includes MRCP) if there are clinical findings of cholestasis. 6. Large stool volume suggestive of constipation. 7. Trace bilateral pleural effusions, trace ascites, and minimal anasarca   suggest volume overload. 8. Incidental findings as above for which no dedicated follow-up is   recommended. RECOMMENDATIONS:   Incidental Pulmonary Nodules on CT      - Solid nodules, single, low risk      >8 mm:  Consider CT 3 months, PET/CT, or tissue sampling      - Solid nodules, single, high risk      >8 mm:  Consider CT 3 months, PET/CT, or tissue sampling      Reference:  Neri et al.  Guidelines for management of incidental   pulmonary nodules detected on CT images: From the Fleischner Society 2017. Radiology [id number]. CT CERVICAL SPINE WO CONTRAST   Final Result   No acute abnormality of the cervical spine. CT HEAD WO CONTRAST   Final Result   No acute intracranial abnormality.       Stable chronic findings to include anterior communicating artery aneurysm. Mild soft tissue swelling to right frontal scalp anteriorly and to a lesser   extent posterior right parietal scalp concerning for soft tissue contusions. No acute bony abnormality. EKG (if obtained): (All EKG's are interpreted by myself in the absence of a cardiologist)    Chart review shows recent radiographs:  CT HEAD WO CONTRAST    Result Date: 9/16/2022  EXAMINATION: CT OF THE HEAD WITHOUT CONTRAST  9/16/2022 3:44 pm TECHNIQUE: CT of the head was performed without the administration of intravenous contrast. Automated exposure control, iterative reconstruction, and/or weight based adjustment of the mA/kV was utilized to reduce the radiation dose to as low as reasonably achievable. COMPARISON: 03/07/2021, 06/08/2020 and CTA head 06/08/2020 HISTORY: ORDERING SYSTEM PROVIDED HISTORY: fall, head injury TECHNOLOGIST PROVIDED HISTORY: Reason for exam:->fall, head injury Has a \"code stroke\" or \"stroke alert\" been called? ->No Decision Support Exception - unselect if not a suspected or confirmed emergency medical condition->Emergency Medical Condition (MA) Reason for Exam: fall, head injury FINDINGS: BRAIN/VENTRICLES: There is age-appropriate generalized atrophy and there is patchy periventricular and subcortical white matter low attenuation that is unchanged and nonspecific but most consistent with chronic small vessel ischemia. There is an unchanged 1.2 cm A-comm aneurysm (axial image 23). No evidence of acute hemorrhage, mass or extra-axial fluid collection. ORBITS: The visualized portion of the orbits demonstrate no acute abnormality. SINUSES: The visualized paranasal sinuses and mastoid air cells demonstrate no acute abnormality. SOFT TISSUES/SKULL:  No acute abnormality of the visualized skull or soft tissues. No acute intracranial abnormality. Generalized atrophy and chronic small vessel ischemic white matter disease.  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 CT CERVICAL SPINE WO CONTRAST    Result Date: 9/16/2022  EXAMINATION: CT OF THE CERVICAL SPINE WITHOUT CONTRAST 9/16/2022 3:45 pm TECHNIQUE: CT of the cervical spine was performed without the administration of intravenous contrast. Multiplanar reformatted images are provided for review. Automated exposure control, iterative reconstruction, and/or weight based adjustment of the mA/kV was utilized to reduce the radiation dose to as low as reasonably achievable. COMPARISON: None. HISTORY: ORDERING SYSTEM PROVIDED HISTORY: neck pain TECHNOLOGIST PROVIDED HISTORY: Reason for exam:->neck pain Decision Support Exception - unselect if not a suspected or confirmed emergency medical condition->Emergency Medical Condition (MA) Reason for Exam: neck pain FINDINGS: BONES/ALIGNMENT: There is a minimal degenerative anterolisthesis of C4 on C5. Vertebral body alignment is otherwise unremarkable. Cervical vertebral body heights are. Facet joints are normally aligned. There is no acute fracture or traumatic malalignment. DEGENERATIVE CHANGES: There is moderate multilevel facet arthropathy. Moderate degenerative disc disease disc space narrowing and spondylosis at C5-C6. SOFT TISSUES: There is no prevertebral soft tissue swelling. No acute fracture or traumatic malalignment of the cervical spine. XR CHEST PORTABLE    Result Date: 9/16/2022  EXAMINATION: ONE XRAY VIEW OF THE CHEST 9/16/2022 7:52 pm COMPARISON: Chest x-ray March 7, 2021 HISTORY: ORDERING SYSTEM PROVIDED HISTORY: fall, elevated troponin TECHNOLOGIST PROVIDED HISTORY: Reason for exam:->fall, elevated troponin Reason for Exam: fall, elevated troponin Additional signs and symptoms: none FINDINGS: Cardiac silhouette is stable. No focal consolidation, pleural effusion, or pneumothorax. Osseous structures appear intact. Postoperative changes of cholecystectomy. 1. No acute cardiopulmonary process identified.      Procedure Note - Laceration repair:  Questions were sought and answered and verbal consent was given by patient for the procedure; additionally spoke with granddaughter who was agreeable to me helping repair the laceration. The area was prepped and draped in standard bedside fashion. The wound area was anesthetized with 3ml of Lidocaine 2% without epinephrine without added sodium bicarbonate. The wound was explored with No foreign bodies found. The wound was repaired with three staples were used. The patient tolerated the procedure well without complications and my repeat neurovascular exam post-procedure is unchanged. Wound care and scar minimization education was provided. Instructions were given to return for increasing pain, redness, streaking, discharge, or any other worsening or worrisome concerns. MDM:  Pt presents as above. Emergent conditions considered. Presentation prompted initial advanced imaging given patient's dementia thorough diagnostics are performed. Patient's creatinine/renal function just a month ago was normal and she is expedited imaging without labs. CT imaging does demonstrate numerous subacute appearing fractures of the spine as well as rib fractures. Patient is nontender on recheck of CT LS spines or ribs so I do suspect these are all chronic in nature additionally, there are incidental findings of a lung nodule and possible sclerotic lesion. Labs are overall nonemergent. Is no evidence of cholestasis on labs. I did discuss the numerous CT findings with patient's granddaughter, Renate Ritchie, regarding plan moving forward. Given patient's advanced dementia she reports that patient would not be a good candidate for any surgeries or invasive procedures at this time. She does not want patient transferred to trauma facility for further evaluation. She asked reports the patient is supposed to be wearing a TLSO brace for her back but will need to wear that at the nursing home.   Patient's scalp laceration was repaired with staples as above. Patient tolerated well. Patient is transported back to nursing home. Questions sought and answered with the patient's granddaughter. They voice understanding and agree with plan. The care of this patient did occur during the COVID-19 pandemic. Clinical Impression:  1. Closed head injury, initial encounter    2. Fall, initial encounter    3. Bone lesion    4. Compression fracture of lumbar vertebra, unspecified lumbar vertebral level, initial encounter [facility])      Disposition referral (if applicable):  No follow-up provider specified. Disposition medications (if applicable):  New Prescriptions    No medications on file       Comment: Please note this report has been produced using speech recognition software and may contain errors related to that system including errors in grammar, punctuation, and spelling, as well as words and phrases that may be inappropriate. If there are any questions or concerns please feel free to contact the dictating provider for clarification.        Arden Recinos MD  10/14/22 [id number]

## 2022-10-14 NOTE — ED NOTES
Called granddaughter Lencho Russo to notify her that her grandmother had fallen and she was here.      Ernie Salinas RN  10/14/22 [id number]

## 2022-10-14 NOTE — ED TRIAGE NOTES
Patient to the ED from [facility]. Patient was found this morning haven fallen out of bed. Patient hit head and is on blood thinners. No reported loss of consciousness. Patient is alert and oriented.  VS stable per EMS

## 2022-10-14 NOTE — ED NOTES
Report called to nurse Quiñones at [facility] and [address], RN  10/14/22 9419

## 2022-10-16 ENCOUNTER — APPOINTMENT (OUTPATIENT)
Dept: CT IMAGING | Age: 82
DRG: 884 | End: 2022-10-16
Payer: COMMERCIAL

## 2022-10-16 ENCOUNTER — HOSPITAL ENCOUNTER (INPATIENT)
Age: 82
LOS: 4 days | Discharge: HOME | DRG: 884 | End: 2022-10-20
Attending: EMERGENCY MEDICINE | Admitting: STUDENT IN AN ORGANIZED HEALTH CARE EDUCATION/TRAINING PROGRAM
Payer: COMMERCIAL

## 2022-10-16 DIAGNOSIS — G93.40 ENCEPHALOPATHY: Primary | ICD-10-CM

## 2022-10-16 PROBLEM — N39.0 UTI (URINARY TRACT INFECTION): Status: RESOLVED | Noted: 2022-09-16 | Resolved: 2022-10-16

## 2022-10-16 LAB
ALBUMIN SERPL-MCNC: 3.2 GM/DL (ref 3.4–5)
ALP BLD-CCNC: 82 IU/L (ref 40–129)
ALT SERPL-CCNC: 10 U/L (ref 10–40)
ANION GAP SERPL CALCULATED.3IONS-SCNC: 9 MMOL/L (ref 4–16)
AST SERPL-CCNC: 23 IU/L (ref 15–37)
BASE EXCESS MIXED: 0.1 (ref 0–2.3)
BASOPHILS ABSOLUTE: 0 K/CU MM
BASOPHILS RELATIVE PERCENT: 0.3 % (ref 0–1)
BILIRUB SERPL-MCNC: 0.4 MG/DL (ref 0–1)
BILIRUBIN URINE: NEGATIVE MG/DL
BLOOD, URINE: NEGATIVE
BUN BLDV-MCNC: 17 MG/DL (ref 6–23)
CALCIUM SERPL-MCNC: 9.1 MG/DL (ref 8.3–10.6)
CHLORIDE BLD-SCNC: 108 MMOL/L (ref 99–110)
CLARITY: CLEAR
CO2: 24 MMOL/L (ref 21–32)
COLOR: YELLOW
COMMENT: ABNORMAL
CREAT SERPL-MCNC: 0.9 MG/DL (ref 0.6–1.1)
DIFFERENTIAL TYPE: ABNORMAL
EOSINOPHILS ABSOLUTE: 0 K/CU MM
EOSINOPHILS RELATIVE PERCENT: 0.3 % (ref 0–3)
GFR AFRICAN AMERICAN: >60 ML/MIN/1.73M2
GFR NON-AFRICAN AMERICAN: >60 ML/MIN/1.73M2
GLUCOSE BLD-MCNC: 104 MG/DL (ref 70–99)
GLUCOSE, URINE: NEGATIVE MG/DL
HCO3 VENOUS: 26 MMOL/L (ref 19–25)
HCT VFR BLD CALC: 34.8 % (ref 37–47)
HEMOGLOBIN: 10.7 GM/DL (ref 12.5–16)
IMMATURE NEUTROPHIL %: 0.3 % (ref 0–0.43)
KETONES, URINE: ABNORMAL MG/DL
LACTATE: 1.6 MMOL/L (ref 0.4–2)
LEUKOCYTE ESTERASE, URINE: NEGATIVE
LYMPHOCYTES ABSOLUTE: 1.1 K/CU MM
LYMPHOCYTES RELATIVE PERCENT: 15.4 % (ref 24–44)
MCH RBC QN AUTO: 28.8 PG (ref 27–31)
MCHC RBC AUTO-ENTMCNC: 30.7 % (ref 32–36)
MCV RBC AUTO: 93.5 FL (ref 78–100)
MONOCYTES ABSOLUTE: 1 K/CU MM
MONOCYTES RELATIVE PERCENT: 14 % (ref 0–4)
NITRITE URINE, QUANTITATIVE: NEGATIVE
NUCLEATED RBC %: 0 %
O2 SAT, VEN: 78.6 % (ref 50–70)
PCO2, VEN: 46 MMHG (ref 38–52)
PDW BLD-RTO: 18 % (ref 11.7–14.9)
PH VENOUS: 7.36 (ref 7.32–7.42)
PH, URINE: 7.5 (ref 5–8)
PLATELET # BLD: 165 K/CU MM (ref 140–440)
PMV BLD AUTO: 9 FL (ref 7.5–11.1)
PO2, VEN: 50 MMHG (ref 28–48)
POTASSIUM SERPL-SCNC: 4.2 MMOL/L (ref 3.5–5.1)
PROTEIN UA: NEGATIVE MG/DL
RBC # BLD: 3.72 M/CU MM (ref 4.2–5.4)
SEGMENTED NEUTROPHILS ABSOLUTE COUNT: 4.9 K/CU MM
SEGMENTED NEUTROPHILS RELATIVE PERCENT: 69.7 % (ref 36–66)
SODIUM BLD-SCNC: 141 MMOL/L (ref 135–145)
SPECIFIC GRAVITY UA: 1.01 (ref 1–1.03)
TOTAL IMMATURE NEUTOROPHIL: 0.02 K/CU MM
TOTAL NUCLEATED RBC: 0 K/CU MM
TOTAL PROTEIN: 6.6 GM/DL (ref 6.4–8.2)
UROBILINOGEN, URINE: 1 MG/DL (ref 0.2–1)
WBC # BLD: 7.1 K/CU MM (ref 4–10.5)

## 2022-10-16 PROCEDURE — 51701 INSERT BLADDER CATHETER: CPT

## 2022-10-16 PROCEDURE — 85025 COMPLETE CBC W/AUTO DIFF WBC: CPT

## 2022-10-16 PROCEDURE — G0378 HOSPITAL OBSERVATION PER HR: HCPCS

## 2022-10-16 PROCEDURE — 83605 ASSAY OF LACTIC ACID: CPT

## 2022-10-16 PROCEDURE — 82805 BLOOD GASES W/O2 SATURATION: CPT

## 2022-10-16 PROCEDURE — 1200000000 HC SEMI PRIVATE

## 2022-10-16 PROCEDURE — 81003 URINALYSIS AUTO W/O SCOPE: CPT

## 2022-10-16 PROCEDURE — 80053 COMPREHEN METABOLIC PANEL: CPT

## 2022-10-16 PROCEDURE — 2580000003 HC RX 258: Performed by: EMERGENCY MEDICINE

## 2022-10-16 PROCEDURE — 99285 EMERGENCY DEPT VISIT HI MDM: CPT

## 2022-10-16 PROCEDURE — 87040 BLOOD CULTURE FOR BACTERIA: CPT

## 2022-10-16 PROCEDURE — 70450 CT HEAD/BRAIN W/O DYE: CPT

## 2022-10-16 PROCEDURE — 96360 HYDRATION IV INFUSION INIT: CPT

## 2022-10-16 PROCEDURE — 2060000000 HC ICU INTERMEDIATE R&B

## 2022-10-16 RX ORDER — MEMANTINE HYDROCHLORIDE 10 MG/1
5 TABLET ORAL DAILY
Status: DISCONTINUED | OUTPATIENT
Start: 2022-10-16 | End: 2022-10-20 | Stop reason: HOSPADM

## 2022-10-16 RX ORDER — LANSOPRAZOLE
30 KIT
Status: DISCONTINUED | OUTPATIENT
Start: 2022-10-17 | End: 2022-10-20 | Stop reason: HOSPADM

## 2022-10-16 RX ORDER — DIVALPROEX SODIUM 125 MG/1
500 CAPSULE, COATED PELLETS ORAL NIGHTLY
Status: DISCONTINUED | OUTPATIENT
Start: 2022-10-16 | End: 2022-10-20 | Stop reason: HOSPADM

## 2022-10-16 RX ORDER — BUSPIRONE HYDROCHLORIDE 5 MG/1
5 TABLET ORAL 3 TIMES DAILY
Status: DISCONTINUED | OUTPATIENT
Start: 2022-10-16 | End: 2022-10-20 | Stop reason: HOSPADM

## 2022-10-16 RX ORDER — EZETIMIBE 10 MG/1
10 TABLET ORAL DAILY
Status: DISCONTINUED | OUTPATIENT
Start: 2022-10-16 | End: 2022-10-20 | Stop reason: HOSPADM

## 2022-10-16 RX ORDER — DIVALPROEX SODIUM 125 MG/1
250 CAPSULE, COATED PELLETS ORAL DAILY
Status: DISCONTINUED | OUTPATIENT
Start: 2022-10-16 | End: 2022-10-20 | Stop reason: HOSPADM

## 2022-10-16 RX ORDER — DOXEPIN HYDROCHLORIDE 10 MG/1
10 CAPSULE ORAL NIGHTLY
Status: DISCONTINUED | OUTPATIENT
Start: 2022-10-16 | End: 2022-10-20 | Stop reason: HOSPADM

## 2022-10-16 RX ORDER — 0.9 % SODIUM CHLORIDE 0.9 %
1000 INTRAVENOUS SOLUTION INTRAVENOUS ONCE
Status: COMPLETED | OUTPATIENT
Start: 2022-10-16 | End: 2022-10-16

## 2022-10-16 RX ADMIN — SODIUM CHLORIDE 1000 ML: 9 INJECTION, SOLUTION INTRAVENOUS at 15:23

## 2022-10-16 ASSESSMENT — PAIN - FUNCTIONAL ASSESSMENT: PAIN_FUNCTIONAL_ASSESSMENT: ADULT NONVERBAL PAIN SCALE (NPVS)

## 2022-10-16 NOTE — ED NOTES
ED TO INPATIENT SBAR HANDOFF    Patient Name: Winnie Garcia   :  1940  80 y.o. MRN:  [id number]  Preferred Name  Carlos Corbettgomery  ED Room #:  TR02/02TR-02  Family/Caregiver Present no   Restraints no   Sitter no   Sepsis Risk Score Sepsis Risk Score: 0.7    Situation  Code Status: Prior No additional code details. Allergies: Ativan [lorazepam], Vicodin [hydrocodone-acetaminophen], and Valium [diazepam]  Weight: No data found. Arrived from: nursing home  Chief Complaint:   Chief Complaint   Patient presents with    Altered Mental Status     Unknown last well known. SNF reports pt has not been acting herself. Pt only responds to pain upon arrival. Eye's opened but not answering any questions. Hospital Problem/Diagnosis:  Principal Problem:    Encephalopathy  Resolved Problems:    * No resolved hospital problems. *    Imaging:   CT HEAD WO CONTRAST   Final Result   No acute intracranial abnormality.       Diffuse atrophic changes with findings suggesting chronic microvascular   ischemia           Abnormal labs:   Abnormal Labs Reviewed   BLOOD GAS, VENOUS - Abnormal; Notable for the following components:       Result Value    pO2, Ky 50 (*)     HCO3, Venous 26.0 (*)     O2 Sat, Ky 78.6 (*)     All other components within normal limits   CBC WITH AUTO DIFFERENTIAL - Abnormal; Notable for the following components:    RBC 3.72 (*)     Hemoglobin 10.7 (*)     Hematocrit 34.8 (*)     MCHC 30.7 (*)     RDW 18.0 (*)     Segs Relative 69.7 (*)     Lymphocytes % 15.4 (*)     Monocytes % 14.0 (*)     All other components within normal limits   COMPREHENSIVE METABOLIC PANEL W/ REFLEX TO MG FOR LOW K - Abnormal; Notable for the following components:    Glucose 104 (*)     Albumin 3.2 (*)     All other components within normal limits   URINALYSIS - Abnormal; Notable for the following components:    Ketones, Urine TRACE (*)     All other components within normal limits     Critical values: no     Abnormal Assessment Findings:    Background  Hospital admissions in last 30 days?  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    ESBL (extended spectrum beta-lactamase) producing bacteria infection 08/19/2021    urine; 8/23/21    Expressive aphasia 06/09/2020       Assessment    Vitals/MEWS: MEWS Score: 3  Level of Consciousness: Responds to pain (2)   Vitals:    10/16/22 1603 10/16/22 1624 10/16/22 1629 10/16/22 1631   BP: (!) 80/53 (!) 138/119 116/69 (!) 105/56   Pulse: 86  86 79   Resp: 16  16 13   Temp:       TempSrc:       SpO2: 96%  98% 100%     FiO2 (%): Room Air  O2 Flow Rate: O2 Device: None (Room air)    Cardiac Rhythm:    Pain Assessment: 0 [] Verbal [x] Artelia Matt Scale  Pain Scale: Pain Assessment  Pain Assessment: Adult Nonverbal Pain Scale (NPVS)  Last documented pain score (0-10 scale)    Last documented pain medication administered: n/a  Mental Status: disoriented  C-SSRS:    Bedside swallow:    Katharine Coma Scale (GCS): Katharine Coma Scale  Eye Opening: Spontaneous  Best Verbal Response: None  Best Motor Response: Withdraws from pain  Covert Coma Scale Score: 9  Active LDA's:   Peripheral IV 10/16/22 Right Antecubital (Active)     PO Status: Unknown it pt is able to take PO   Pertinent or High Risk Medications/Drips: no   If Yes, please provide details: n/a  Pending Blood Product Administration: no     Blood Cultures: see ED pt care timeline or ED Event log    Recommendation    Pending orders n/a  Plan for Discharge (if known): Additional Comments: Pt has been nonverbal since arriving in the ED.     If any further questions, please call Sending RN at [facility] @ [phone]    Electronically signed by: Electronically signed by Jocelyn Castrejon RN on 10/16/2022 at 4:54 PM       Jocelyn Castrejon RN  10/16/22 [id number]

## 2022-10-16 NOTE — H&P
V2.0  History and Physical      Name:  Marlo Shelley /Age/Sex: 1940  (80 y.o. female)   MRN & CSN:  [id number] & [id number] Encounter Date/Time: 10/16/2022 4:55 PM EDT   Location:   PCP: Michelle Arreguin [address] Day: 1    Assessment and Plan:   Marlo Shelley is a 80 y.o. female with a pmh of anterior communicating artery, ESBL, and expressive aphasia who presents with Encephalopathy    Hospital Problems             Last Modified POA    * (Principal) Encephalopathy 10/16/2022 Yes       Altered mental status-clear etiology unknown the following are in the differentials:  acute encephalopathy  Acute delirium 2/2 polypharmacy  Post concussive therapy status post fall on 10/14/2022  Advanced dementia  --Reports to ED today from SNF for altered mental status, last known well is unknown. - On arrival, patient responds to pain and opens eyes however not answering questions. - On exam patient keeps eyes open spontaneously, responds to tactile stimuli, and attempts to vocalize during exam, and swats stethoscope upon auscultation. -- Patient lives at skilled nursing facility, and patient presented to ED on 10/14 for fall, received CT of head, chest, cervical, thoracic, and lumbar spine. Patient was found to have numerous abnormal CT findings, in which the ED physician spoke with patient's granddaughter regarding moving forward. Patient was sent back to SNF after family declined transfer to trauma facility for further evaluation. --Per chart review, patient is on several sedating medications, that may be an underlying contribution of her altered mental status, in addition to history of advanced dementia superimposed on by recent head trauma.   -- CBC, and UA not indicative of acute infection, CMP unremarkable; lactic acid 1.9 CT head without contrast is nonacute, does not show any hemorrhaging, diffuse atrophic changes with finding suggesting chronic microvascular ischemia. -- VBG PO2 50; HCO3 26; O2 sat 78.6  --Urine culture ordered  - Neurology consult, will defer further imaging per neurology recommendations at this time.  --PT/OT/ST  --Limit sedating medications  --Encourage appropriate wake/sleep cycle  --Repeat labs in a.m.  --Maintenance IV fluid    Advance dementia  Anterior communicating artery aneurysm  Expressive aphasia  --Patient is on memantine  --Patient has expressive aphasia at baseline    GERD  - Continue PPI    Disposition:   Current Living situation: LTC  Expected Disposition: LTC  Estimated D/C: 1-2 days    Diet No diet orders on file   DVT Prophylaxis [] Lovenox, []  Heparin, [x] SCDs, [] Ambulation,  [] Eliquis, [] Xarelto   Code Status Prior   Surrogate Decision Maker/ POA Granddaughter More Conley     History from:     Electronic medical record; ED attending    History of Present Illness:     Chief Complaint: Encephalopathy   Dinah Gandhi is a 80 y.o. female with a pmh of anterior communicating artery, ESBL, and expressive aphasia who presents with Encephalopathy. Patient arrives to ED from SNF for altered mental status. Patient does have advanced dementia, and is expressive aphasic, however reportedly she is not as responsive as her usual.  Did speak with her granddaughter More Conley on the phone, and granddaughter did verify patient being in the ED yesterday, and declining transfer to a trauma center status post fall with head injury. On exam, patient is minimally verbal, in which during exam, patient says \"leave me the hell alone\" and attempts to pull at blankets. This may be a slight improvement from her presentation, as patient appears to not be verbal on arrival.  In ED she received a liter of fluids, and labs did not suggest any acute infection, and CT head did not show any bleeding. Neurology consulted, and will defer imaging to their recommendation.   Review of Systems: Need 10 Elements   Review of Systems   Unable to perform ROS: Dementia       Objective:   No intake or output data in the 24 hours ending 10/16/22 1655   Vitals:   Vitals:    10/16/22 1603 10/16/22 1624 10/16/22 1629 10/16/22 1631   BP: (!) 80/53 (!) 138/119 116/69 (!) 105/56   Pulse: 86  86 79   Resp: 16  16 13   Temp:       TempSrc:       SpO2: 96%  98% 100%       Medications Prior to Admission     Prior to Admission medications    Medication Sig Start Date End Date Taking? Authorizing Provider   divalproex (DEPAKOTE SPRINKLE) 125 MG capsule Take 1 capsule by mouth every 8 hours as needed (for agitation) 9/19/22 9/26/22  Job Brandon MD   divalproex (DEPAKOTE SPRINKLE) 125 MG capsule Take 1 capsule by mouth Daily with supper for 7 days 9/19/22 9/26/22  Job Brandon MD   busPIRone (BUSPAR) 5 MG tablet Take 1 tablet by mouth 3 times daily 3/17/21   KALI Ojeda CNP   divalproex (DEPAKOTE SPRINKLE) 125 MG capsule Take 2 capsules by mouth daily 3/18/21   KALI Ojeda CNP   divalproex (DEPAKOTE SPRINKLE) 125 MG capsule Take 4 capsules by mouth nightly 3/17/21   KALI Ojeda CNP   doxepin (SINEQUAN) 10 MG capsule Take 1 capsule by mouth nightly 3/17/21   KALI Ojeda CNP   memantine [facility]) 5 MG tablet Take 1 tablet by mouth daily 3/18/21   KALI Ojeda CNP   aspirin 81 MG chewable tablet Take 1 tablet by mouth daily 6/14/20   Radha Marshall MD   Calcium Carbonate-Vitamin D 500-125 MG-UNIT TABS Take 1 tablet by mouth daily    Historical Provider, MD   ezetimibe (ZETIA) 10 MG tablet Take 10 mg by mouth daily    Historical Provider, MD   omeprazole (PRILOSEC) 40 MG delayed release capsule Take 40 mg by mouth daily 6/3/20   Historical Provider, MD       Physical Exam: Need 8 Elements   Physical Exam  HENT:      Head: Normocephalic. Nose: Rhinorrhea present. Mouth/Throat:      Mouth: Mucous membranes are dry. Cardiovascular:      Rate and Rhythm: Normal rate and regular rhythm.    Pulmonary:      Effort: Pulmonary effort is normal.      Breath sounds: No wheezing. Abdominal:      General: Bowel sounds are normal.      Palpations: Abdomen is soft. Musculoskeletal:         General: No swelling. Cervical back: No rigidity. Skin:     General: Skin is warm and dry. Coloration: Skin is pale. Neurological:      Mental Status: She is alert. Sensory: No sensory deficit. Comments: Advanced dementia, unable to get a full history or following of commands, however does move to tactile stimuli, so appears sensory is intact. Psychiatric:      Comments: Advanced dementia history        G    Past Medical History:   PMHx   Past Medical History:   Diagnosis Date    Anterior communicating artery aneurysm 06/09/2020    ESBL (extended spectrum beta-lactamase) producing bacteria infection 08/19/2021    urine; 8/23/21    Expressive aphasia 06/09/2020     PSHX:  has no past surgical history on file. Allergies: Allergies   Allergen Reactions    Ativan [Lorazepam] Other (See Comments)     Psychotic Reaction/Combative    Vicodin [Hydrocodone-Acetaminophen] Other (See Comments)     Psychotic reaction    Valium [Diazepam] Other (See Comments)     Ineffective     Fam HX: family history is not on file.   Soc HX:   Social History     Socioeconomic History    Marital status: Single   Tobacco Use    Smoking status: Former     Types: Cigarettes    Smokeless tobacco: Never   Substance and Sexual Activity    Alcohol use: Not Currently    Drug use: Never    Sexual activity: Not Currently       Medications:   Medications:    Infusions:   PRN Meds:     Labs      CBC:   Recent Labs     10/14/22  0915 10/16/22  1510   WBC 3.2* 7.1   HGB 11.6* 10.7*    165     BMP:    Recent Labs     10/14/22  0915 10/16/22  1510    141   K 3.7 4.2    108   CO2 29 24   BUN 15 17   CREATININE 0.7 0.9   GLUCOSE 87 104*     Hepatic:   Recent Labs     10/14/22  0915 10/16/22  1510   AST 25 23   ALT 12 10   BILITOT 0.3 0.4   ALKPHOS 97 82     Lipids: Lab Results   Component Value Date/Time    HDL 65 03/10/2021 06:00 AM     Hemoglobin A1C:   Lab Results   Component Value Date/Time    LABA1C 4.9 03/10/2021 06:00 AM     TSH: No results found for: TSH  Troponin:   Lab Results   Component Value Date/Time    TROPONINT <0.010 09/17/2022 09:32 AM    TROPONINT 0.012 09/16/2022 03:10 PM    TROPONINT <0.010 06/08/2020 02:20 PM     Lactic Acid: No results for input(s): LACTA in the last 72 hours. BNP: No results for input(s): PROBNP in the last 72 hours. UA:  Lab Results   Component Value Date/Time    NITRU NEGATIVE 10/16/2022 03:27 PM    COLORU YELLOW 10/16/2022 03:27 PM    [address] 09/16/2022 07:05 PM    RBCUA 28 09/16/2022 07:05 PM    MUCUS RARE 09/02/2022 09:32 PM    TRICHOMONAS NONE SEEN 09/16/2022 07:05 PM    YEAST OCCASIONAL 03/29/2021 08:00 PM    BACTERIA MANY 09/16/2022 07:05 PM    CLARITYU CLEAR 10/16/2022 03:27 PM    SPECGRAV 1.015 10/16/2022 03:27 PM    LEUKOCYTESUR NEGATIVE 10/16/2022 03:27 PM    UROBILINOGEN 1.0 10/16/2022 03:27 PM    BILIRUBINUR NEGATIVE 10/16/2022 03:27 PM    BLOODU NEGATIVE 10/16/2022 03:27 PM    KETUA TRACE 10/16/2022 03:27 PM     Urine Cultures: No results found for: Katrin Valverde  Blood Cultures: No results found for: BC  No results found for: BLOODCULT2  Organism: No results found for: ORG    Imaging/Diagnostics Last 24 Hours   CT HEAD WO CONTRAST    Result Date: 10/16/2022  EXAMINATION: CT OF THE HEAD WITHOUT CONTRAST  10/16/2022 4:09 pm TECHNIQUE: CT of the head was performed without the administration of intravenous contrast. Automated exposure control, iterative reconstruction, and/or weight based adjustment of the mA/kV was utilized to reduce the radiation dose to as low as reasonably achievable. COMPARISON: 10/14/2022 HISTORY: ORDERING SYSTEM PROVIDED HISTORY: ams TECHNOLOGIST PROVIDED HISTORY: Has a \"code stroke\" or \"stroke alert\" been called? ->No Reason for exam:->ams Decision Support Exception - unselect if not a suspected or confirmed emergency medical condition->Emergency Medical Condition (MA) Reason for Exam: AMS,ADVANCED STAGE OF DEMENTIA,FALL OUT OF BED, GREENISH OLD BRUISING ALL OVER RIGHT EYE AND FOREHEAD FINDINGS: BRAIN/VENTRICLES: The ventricles and sulci are diffusely enlarged. Low attenuation is seen in the periventricular and subcortical white matter. No acute intracranial hemorrhage or acute infarct is identified. ORBITS: The visualized portion of the orbits demonstrate no acute abnormality. SINUSES: The visualized paranasal sinuses and mastoid air cells demonstrate no acute abnormality. SOFT TISSUES/SKULL:  No acute abnormality of the visualized skull or soft tissues. No acute intracranial abnormality. Diffuse atrophic changes with findings suggesting chronic microvascular ischemia     CT HEAD WO CONTRAST    Result Date: 10/14/2022  EXAMINATION: CT OF THE HEAD WITHOUT CONTRAST  10/14/2022 7:32 am TECHNIQUE: CT of the head was performed without the administration of intravenous contrast. Automated exposure control, iterative reconstruction, and/or weight based adjustment of the mA/kV was utilized to reduce the radiation dose to as low as reasonably achievable. COMPARISON: 09/16/2022. HISTORY: ORDERING SYSTEM PROVIDED HISTORY: closed head injury, + dementia TECHNOLOGIST PROVIDED HISTORY: Reason for exam:->closed head injury, + dementia Has a \"code stroke\" or \"stroke alert\" been called? ->No Decision Support Exception - unselect if not a suspected or confirmed emergency medical condition->Emergency Medical Condition (MA) Reason for Exam: closed head injury, + dementia Additional signs and symptoms: none Relevant Medical/Surgical History: none FINDINGS: BRAIN/VENTRICLES: There is no acute intracranial hemorrhage, mass effect or midline shift. No abnormal extra-axial fluid collection. The gray-white differentiation is maintained without evidence of an acute infarct.  There is prominence of the ventricles and sulci due to global parenchymal volume loss. There are nonspecific areas of hypoattenuation within the periventricular and subcortical white matter, which likely represent chronic microvascular ischemic change. Redemonstration of ovoid high attenuation focus correlating with anterior communicating artery aneurysm better demonstrated on prior remote CTA and MRI studies from June of 2020. This appears stable given differences in modality. ORBITS: The visualized portion of the orbits demonstrate no acute abnormality. SINUSES: The visualized paranasal sinuses and mastoid air cells demonstrate no acute abnormality. SOFT TISSUES/SKULL: Mild soft tissue swelling to right frontal scalp anteriorly and to posterior right parietal scalp. No acute bony abnormality. No acute intracranial abnormality. Stable chronic findings to include anterior communicating artery aneurysm. Mild soft tissue swelling to right frontal scalp anteriorly and to a lesser extent posterior right parietal scalp concerning for soft tissue contusions. No acute bony abnormality. CT CHEST W CONTRAST    Result Date: 10/14/2022  EXAMINATION: CT OF THE CHEST WITH CONTRAST; CT OF THE THORACIC SPINE WITHOUT CONTRAST; CT OF THE ABDOMEN AND PELVIS WITH CONTRAST 10/14/2022 7:45 am; 10/14/2022 7:47 am TECHNIQUE: CT of the chest was performed with the administration of intravenous contrast. Multiplanar reformatted images are provided for review. Automated exposure control, iterative reconstruction, and/or weight based adjustment of the mA/kV was utilized to reduce the radiation dose to as low as reasonably achievable.; CT of the thoracic spine was performed without the administration of intravenous contrast. Multiplanar reformatted images are provided for review.  Automated exposure control, iterative reconstruction, and/or weight based adjustment of the mA/kV was utilized to reduce the radiation dose to as low as reasonably achievable.; CT of the abdomen and pelvis was performed with the administration of intravenous contrast. Multiplanar reformatted images are provided for review. Automated exposure control, iterative reconstruction, and/or weight based adjustment of the mA/kV was utilized to reduce the radiation dose to as low as reasonably achievable. COMPARISON: Chest radiograph HISTORY: ORDERING SYSTEM PROVIDED HISTORY: fall, + dementia TECHNOLOGIST PROVIDED HISTORY: Reason for exam:->fall, + dementia Decision Support Exception - unselect if not a suspected or confirmed emergency medical condition->Emergency Medical Condition (MA) Reason for Exam: fall, + dementia Additional signs and symptoms: unable to raise arms Relevant Medical/Surgical History: 75ml isovue 370/ trauma alert FINDINGS: CHEST MEDIASTINUM:  Normal heart size. Mitral and aortic valve annular calcifications. No pericardial effusion. Mild coronary atherosclerotic calcifications. Minimal systemic atherosclerosis. No mediastinal nor hilar lymphadenopathy. LUNGS/PLEURA:  Patent central airways. Partial collapse of portions of the right middle right lower lobes with associated traction bronchiectasis suggestive of scarring. Calcified granulomas in the right upper and bilateral lower lobes. 0.9 cm x 0.7 cm solid nodule in the anteromedial basilar segment of the left lower lobe along the left major fissure with stippled calcifications and irregular margins. Passive atelectasis in the bilateral lower lobes. Trace bilateral effusions. No pneumothoraces. SOFT TISSUES/BONES:   No supraclavicular nor axillary lymphadenopathy. Diffuse bony demineralization. No acute fracture. Healing fractures involving the anterolateral left 3rd rib and heads of the right 9th and 11th ribs. Joints maintain anatomic alignment. ABDOMEN/PELVIS Lack of intra-abdominal fat, partially limiting evaluation of the peritoneal cavity.  ORGANS:  0.5 cm hypodense lesion in hepatic segment 3, too small to characterize but likely a cyst or hemangioma. Surgically absent gallbladder. Mild intrahepatic and moderate extrahepatic biliary dilation. Mild dilation of the main pancreatic duct. Severe pancreatic atrophy splenic granulomatous calcifications. Mild right renal atrophy. 0.4 cm homogeneously hypodense lesion in the posterolateral interpolar right kidney, too small to characterize but likely a cyst (Bosniak category 2). Normal adrenal glands and left kidney. GI/BOWEL:  Small sliding hiatal hernia. Changes of partial proctectomy with colorectal anastomosis. Otherwise normal course and caliber of the stomach, small bowel, colon, and remnant rectum without obstruction. Normal appendix. Large stool. No abnormal soft tissue near the anastomosis. PELVIS:  Age-appropriate atrophy of the uterus and ovaries. Markedly distended urinary bladder. Laxity of the pelvic floor musculature. PERITONEUM/RETROPERITONEUM:  Mild to moderate systemic atherosclerosis. No abdominal nor pelvic lymphadenopathy. Trace free intraperitoneal fluid. No free intraperitoneal gas. SOFT TISSUES/BONES:  Minimal subcutaneous edema. Focal subcutaneous contusion and hematoma lateral to the right hip. Subcutaneous stranding superficial to the bilateral ischial tuberosities suggestive of pressure injuries with no decubitus ulceration. No inguinal lymphadenopathy. Diffuse bony demineralization. No acute fracture. Joints maintain anatomic alignment. THORACIC SPINE Of note, lumbar spine findings reported separately. BONES/ALIGNMENT:  Diffuse bony demineralization. T9 burst fracture with mild to moderate height loss, partial visualization of a fracture line anteriorly, retropulsion of posterior wall fragments by up to 0.3 cm, and heterogeneous sclerosis. Chronic T11 superior endplate compression deformity suggestive of a prior Schmorl's node.   T12 burst fracture with moderate to severe height loss, retropulsion posterior wall fragments by up to 0.4 cm, no definite fracture line, and no sclerosis. Maintenance of thoracic kyphosis. No spondylolisthesis. DEGENERATIVE CHANGES:  Minimal to moderate degenerative disc disease. Minimal to mild facet arthropathy. SOFT TISSUES:  Normal appearance of the paravertebral soft tissues. 1. Subacute appearing T9 burst fracture with mild to moderate height loss. Posterior wall fragments are retropulsed by up to 0.3 cm with no significant associated spinal canal stenosis. Of note, a chronic appearing T11 superior endplate compression deformity and a chronic appearing T12 burst deformity are also present. 2. Subacute appearing fractures of the left 3rd rib and right 9th and 11th ribs. 3. Subcutaneous contusion and hematoma lateral to the right hip. Otherwise no evident acute injury of the abdomen or pelvis. 4. A 0.8 cm solid nodule in the left lower lobe has indeterminate features but is more suggestive of a a neoplasm than a sequela of an infectious or inflammatory process. Recommend follow-up as below. Of note, the below recommendations would not apply in the setting prior malignancy not reported in the electronic medical record. 5. Moderate extrahepatic and mild intrahepatic biliary dilation are likely related to cholecystectomy and age. However, there is also mild dilation of the main pancreatic duct with no definite visualization of an obstructing ampullary or pancreatic lesion. Consider pancreas protocol abdomen MRI (which includes MRCP) if there are clinical findings of cholestasis. 6. Large stool volume suggestive of constipation. 7. Trace bilateral pleural effusions, trace ascites, and minimal anasarca suggest volume overload. 8. Incidental findings as above for which no dedicated follow-up is recommended.  RECOMMENDATIONS: Incidental Pulmonary Nodules on CT - Solid nodules, single, low risk >8 mm:  Consider CT 3 months, PET/CT, or tissue sampling - Solid nodules, single, high risk >8 mm:  Consider CT 3 months, PET/CT, or tissue sampling [place]  [facility] [place].  Guidelines for management of incidental pulmonary nodules detected on CT images: From the Fleischner Society 2017. Radiology [id number];[id number]. CT CERVICAL SPINE WO CONTRAST    Result Date: 10/14/2022  EXAMINATION: CT OF THE CERVICAL SPINE WITHOUT CONTRAST 10/14/2022 7:38 am TECHNIQUE: CT of the cervical spine was performed without the administration of intravenous contrast. Multiplanar reformatted images are provided for review. Automated exposure control, iterative reconstruction, and/or weight based adjustment of the mA/kV was utilized to reduce the radiation dose to as low as reasonably achievable. COMPARISON: None. HISTORY: ORDERING SYSTEM PROVIDED HISTORY: closed head injury, + dementia TECHNOLOGIST PROVIDED HISTORY: Reason for exam:->closed head injury, + dementia Decision Support Exception - unselect if not a suspected or confirmed emergency medical condition->Emergency Medical Condition (MA) Reason for Exam: closed head injury, + dementia Additional signs and symptoms: none Relevant Medical/Surgical History: none FINDINGS: BONES/ALIGNMENT: There is no acute fracture or traumatic malalignment. DEGENERATIVE CHANGES: No significant degenerative changes. SOFT TISSUES: There is no prevertebral soft tissue swelling. No acute abnormality of the cervical spine. CT ABDOMEN PELVIS W IV CONTRAST Additional Contrast? None    Result Date: 10/14/2022  EXAMINATION: CT OF THE CHEST WITH CONTRAST; CT OF THE THORACIC SPINE WITHOUT CONTRAST; CT OF THE ABDOMEN AND PELVIS WITH CONTRAST 10/14/2022 7:45 am; 10/14/2022 7:47 am TECHNIQUE: CT of the chest was performed with the administration of intravenous contrast. Multiplanar reformatted images are provided for review.  Automated exposure control, iterative reconstruction, and/or weight based adjustment of the mA/kV was utilized to reduce the radiation dose to as low as reasonably achievable.; CT of the thoracic spine was performed without the administration of intravenous contrast. Multiplanar reformatted images are provided for review. Automated exposure control, iterative reconstruction, and/or weight based adjustment of the mA/kV was utilized to reduce the radiation dose to as low as reasonably achievable.; CT of the abdomen and pelvis was performed with the administration of intravenous contrast. Multiplanar reformatted images are provided for review. Automated exposure control, iterative reconstruction, and/or weight based adjustment of the mA/kV was utilized to reduce the radiation dose to as low as reasonably achievable. COMPARISON: Chest radiograph HISTORY: ORDERING SYSTEM PROVIDED HISTORY: fall, + dementia TECHNOLOGIST PROVIDED HISTORY: Reason for exam:->fall, + dementia Decision Support Exception - unselect if not a suspected or confirmed emergency medical condition->Emergency Medical Condition (MA) Reason for Exam: fall, + dementia Additional signs and symptoms: unable to raise arms Relevant Medical/Surgical History: 75ml isovue 370/ trauma alert FINDINGS: CHEST MEDIASTINUM:  Normal heart size. Mitral and aortic valve annular calcifications. No pericardial effusion. Mild coronary atherosclerotic calcifications. Minimal systemic atherosclerosis. No mediastinal nor hilar lymphadenopathy. LUNGS/PLEURA:  Patent central airways. Partial collapse of portions of the right middle right lower lobes with associated traction bronchiectasis suggestive of scarring. Calcified granulomas in the right upper and bilateral lower lobes. 0.9 cm x 0.7 cm solid nodule in the anteromedial basilar segment of the left lower lobe along the left major fissure with stippled calcifications and irregular margins. Passive atelectasis in the bilateral lower lobes. Trace bilateral effusions. No pneumothoraces. SOFT TISSUES/BONES:   No supraclavicular nor axillary lymphadenopathy. Diffuse bony demineralization. No acute fracture.   Healing fractures involving the anterolateral left 3rd rib and heads of the right 9th and 11th ribs. Joints maintain anatomic alignment. ABDOMEN/PELVIS Lack of intra-abdominal fat, partially limiting evaluation of the peritoneal cavity. ORGANS:  0.5 cm hypodense lesion in hepatic segment 3, too small to characterize but likely a cyst or hemangioma. Surgically absent gallbladder. Mild intrahepatic and moderate extrahepatic biliary dilation. Mild dilation of the main pancreatic duct. Severe pancreatic atrophy splenic granulomatous calcifications. Mild right renal atrophy. 0.4 cm homogeneously hypodense lesion in the posterolateral interpolar right kidney, too small to characterize but likely a cyst (Bosniak category 2). Normal adrenal glands and left kidney. GI/BOWEL:  Small sliding hiatal hernia. Changes of partial proctectomy with colorectal anastomosis. Otherwise normal course and caliber of the stomach, small bowel, colon, and remnant rectum without obstruction. Normal appendix. Large stool. No abnormal soft tissue near the anastomosis. PELVIS:  Age-appropriate atrophy of the uterus and ovaries. Markedly distended urinary bladder. Laxity of the pelvic floor musculature. PERITONEUM/RETROPERITONEUM:  Mild to moderate systemic atherosclerosis. No abdominal nor pelvic lymphadenopathy. Trace free intraperitoneal fluid. No free intraperitoneal gas. SOFT TISSUES/BONES:  Minimal subcutaneous edema. Focal subcutaneous contusion and hematoma lateral to the right hip. Subcutaneous stranding superficial to the bilateral ischial tuberosities suggestive of pressure injuries with no decubitus ulceration. No inguinal lymphadenopathy. Diffuse bony demineralization. No acute fracture. Joints maintain anatomic alignment. THORACIC SPINE Of note, lumbar spine findings reported separately. BONES/ALIGNMENT:  Diffuse bony demineralization.   T9 burst fracture with mild to moderate height loss, partial visualization of a fracture line anteriorly, retropulsion of posterior wall fragments by up to 0.3 cm, and heterogeneous sclerosis. Chronic T11 superior endplate compression deformity suggestive of a prior Schmorl's node. T12 burst fracture with moderate to severe height loss, retropulsion posterior wall fragments by up to 0.4 cm, no definite fracture line, and no sclerosis. Maintenance of thoracic kyphosis. No spondylolisthesis. DEGENERATIVE CHANGES:  Minimal to moderate degenerative disc disease. Minimal to mild facet arthropathy. SOFT TISSUES:  Normal appearance of the paravertebral soft tissues. 1. Subacute appearing T9 burst fracture with mild to moderate height loss. Posterior wall fragments are retropulsed by up to 0.3 cm with no significant associated spinal canal stenosis. Of note, a chronic appearing T11 superior endplate compression deformity and a chronic appearing T12 burst deformity are also present. 2. Subacute appearing fractures of the left 3rd rib and right 9th and 11th ribs. 3. Subcutaneous contusion and hematoma lateral to the right hip. Otherwise no evident acute injury of the abdomen or pelvis. 4. A 0.8 cm solid nodule in the left lower lobe has indeterminate features but is more suggestive of a a neoplasm than a sequela of an infectious or inflammatory process. Recommend follow-up as below. Of note, the below recommendations would not apply in the setting prior malignancy not reported in the electronic medical record. 5. Moderate extrahepatic and mild intrahepatic biliary dilation are likely related to cholecystectomy and age. However, there is also mild dilation of the main pancreatic duct with no definite visualization of an obstructing ampullary or pancreatic lesion. Consider pancreas protocol abdomen MRI (which includes MRCP) if there are clinical findings of cholestasis. 6. Large stool volume suggestive of constipation.  7. Trace bilateral pleural effusions, trace ascites, and minimal anasarca suggest volume overload. 8. Incidental findings as above for which no dedicated follow-up is recommended. RECOMMENDATIONS: Incidental Pulmonary Nodules on CT - Solid nodules, single, low risk >8 mm:  Consider CT 3 months, PET/CT, or tissue sampling - Solid nodules, single, high risk >8 mm:  Consider CT 3 months, PET/CT, or tissue sampling Reference:  Neri et al.  Guidelines for management of incidental pulmonary nodules detected on CT images: From the Fleischner Society 2017. Radiology [id number]. CT LUMBAR RECONSTRUCTION WO POST PROCESS    Result Date: 10/14/2022  EXAMINATION: CT OF THE LUMBAR SPINE WITHOUT CONTRAST  10/14/2022 TECHNIQUE: CT of the lumbar spine was performed without the administration of intravenous contrast. Multiplanar reformatted images are provided for review. Adjustment of mA and/or kV according to patient size was utilized. Automated exposure control, iterative reconstruction, and/or weight based adjustment of the mA/kV was utilized to reduce the radiation dose to as low as reasonably achievable. COMPARISON: None HISTORY: ORDERING SYSTEM PROVIDED HISTORY: fall, dementia TECHNOLOGIST PROVIDED HISTORY: Reason for exam:->fall, dementia Decision Support Exception - unselect if not a suspected or confirmed emergency medical condition->Emergency Medical Condition (MA) Reason for Exam: fall, dementia Additional signs and symptoms: unable to follow commands Relevant Medical/Surgical History: trauma alert/ poor historian FINDINGS: BONES/ALIGNMENT: There is normal alignment of the spine. There are multiple compression fractures identified at T9, T11, T12, L2, L3 and L4. These fractures are likely old. The most severely affected level is at T12 with loss of 80% of vertebral body height No osseous destructive lesion is seen. There is sclerosis of the left iliac bone near the SI joint which is nonspecific in etiology. DEGENERATIVE CHANGES: No significant degenerative changes of the lumbar spine.  SOFT TISSUES/RETROPERITONEUM: No paraspinal mass is seen. Multiple compression fractures identified at T9, T11, T12, L2, L3, and at L4. The most severely affected level is at T12 with loss of 80% of vertebral body height. These fractures are likely old though MRI of the lumbar spine may be considered for confirmation. Sclerotic lesion within the left iliac bone new left SI joint. While a benign fibro-osseous lesion is suspected, metastasis is not excluded. Follow-up bone scan may be considered non emergently. CT THORACIC RECONSTRUCTION WO POST PROCESS    Result Date: 10/14/2022  EXAMINATION: CT OF THE CHEST WITH CONTRAST; CT OF THE THORACIC SPINE WITHOUT CONTRAST; CT OF THE ABDOMEN AND PELVIS WITH CONTRAST 10/14/2022 7:45 am; 10/14/2022 7:47 am TECHNIQUE: CT of the chest was performed with the administration of intravenous contrast. Multiplanar reformatted images are provided for review. Automated exposure control, iterative reconstruction, and/or weight based adjustment of the mA/kV was utilized to reduce the radiation dose to as low as reasonably achievable.; CT of the thoracic spine was performed without the administration of intravenous contrast. Multiplanar reformatted images are provided for review. Automated exposure control, iterative reconstruction, and/or weight based adjustment of the mA/kV was utilized to reduce the radiation dose to as low as reasonably achievable.; CT of the abdomen and pelvis was performed with the administration of intravenous contrast. Multiplanar reformatted images are provided for review. Automated exposure control, iterative reconstruction, and/or weight based adjustment of the mA/kV was utilized to reduce the radiation dose to as low as reasonably achievable.  COMPARISON: Chest radiograph HISTORY: ORDERING SYSTEM PROVIDED HISTORY: fall, + dementia TECHNOLOGIST PROVIDED HISTORY: Reason for exam:->fall, + dementia Decision Support Exception - unselect if not a suspected or confirmed emergency medical condition->Emergency Medical Condition (MA) Reason for Exam: fall, + dementia Additional signs and symptoms: unable to raise arms Relevant Medical/Surgical History: 75ml isovue 370/ trauma alert FINDINGS: CHEST MEDIASTINUM:  Normal heart size. Mitral and aortic valve annular calcifications. No pericardial effusion. Mild coronary atherosclerotic calcifications. Minimal systemic atherosclerosis. No mediastinal nor hilar lymphadenopathy. LUNGS/PLEURA:  Patent central airways. Partial collapse of portions of the right middle right lower lobes with associated traction bronchiectasis suggestive of scarring. Calcified granulomas in the right upper and bilateral lower lobes. 0.9 cm x 0.7 cm solid nodule in the anteromedial basilar segment of the left lower lobe along the left major fissure with stippled calcifications and irregular margins. Passive atelectasis in the bilateral lower lobes. Trace bilateral effusions. No pneumothoraces. SOFT TISSUES/BONES:   No supraclavicular nor axillary lymphadenopathy. Diffuse bony demineralization. No acute fracture. Healing fractures involving the anterolateral left 3rd rib and heads of the right 9th and 11th ribs. Joints maintain anatomic alignment. ABDOMEN/PELVIS Lack of intra-abdominal fat, partially limiting evaluation of the peritoneal cavity. ORGANS:  0.5 cm hypodense lesion in hepatic segment 3, too small to characterize but likely a cyst or hemangioma. Surgically absent gallbladder. Mild intrahepatic and moderate extrahepatic biliary dilation. Mild dilation of the main pancreatic duct. Severe pancreatic atrophy splenic granulomatous calcifications. Mild right renal atrophy. 0.4 cm homogeneously hypodense lesion in the posterolateral interpolar right kidney, too small to characterize but likely a cyst (Bosniak category 2). Normal adrenal glands and left kidney. GI/BOWEL:  Small sliding hiatal hernia.   Changes of partial proctectomy with colorectal anastomosis. Otherwise normal course and caliber of the stomach, small bowel, colon, and remnant rectum without obstruction. Normal appendix. Large stool. No abnormal soft tissue near the anastomosis. PELVIS:  Age-appropriate atrophy of the uterus and ovaries. Markedly distended urinary bladder. Laxity of the pelvic floor musculature. PERITONEUM/RETROPERITONEUM:  Mild to moderate systemic atherosclerosis. No abdominal nor pelvic lymphadenopathy. Trace free intraperitoneal fluid. No free intraperitoneal gas. SOFT TISSUES/BONES:  Minimal subcutaneous edema. Focal subcutaneous contusion and hematoma lateral to the right hip. Subcutaneous stranding superficial to the bilateral ischial tuberosities suggestive of pressure injuries with no decubitus ulceration. No inguinal lymphadenopathy. Diffuse bony demineralization. No acute fracture. Joints maintain anatomic alignment. THORACIC SPINE Of note, lumbar spine findings reported separately. BONES/ALIGNMENT:  Diffuse bony demineralization. T9 burst fracture with mild to moderate height loss, partial visualization of a fracture line anteriorly, retropulsion of posterior wall fragments by up to 0.3 cm, and heterogeneous sclerosis. Chronic T11 superior endplate compression deformity suggestive of a prior Schmorl's node. T12 burst fracture with moderate to severe height loss, retropulsion posterior wall fragments by up to 0.4 cm, no definite fracture line, and no sclerosis. Maintenance of thoracic kyphosis. No spondylolisthesis. DEGENERATIVE CHANGES:  Minimal to moderate degenerative disc disease. Minimal to mild facet arthropathy. SOFT TISSUES:  Normal appearance of the paravertebral soft tissues. 1. Subacute appearing T9 burst fracture with mild to moderate height loss. Posterior wall fragments are retropulsed by up to 0.3 cm with no significant associated spinal canal stenosis.   Of note, a chronic appearing T11 superior endplate compression deformity and a chronic appearing T12 burst deformity are also present. 2. Subacute appearing fractures of the left 3rd rib and right 9th and 11th ribs. 3. Subcutaneous contusion and hematoma lateral to the right hip. Otherwise no evident acute injury of the abdomen or pelvis. 4. A 0.8 cm solid nodule in the left lower lobe has indeterminate features but is more suggestive of a a neoplasm than a sequela of an infectious or inflammatory process. Recommend follow-up as below. Of note, the below recommendations would not apply in the setting prior malignancy not reported in the electronic medical record. 5. Moderate extrahepatic and mild intrahepatic biliary dilation are likely related to cholecystectomy and age. However, there is also mild dilation of the main pancreatic duct with no definite visualization of an obstructing ampullary or pancreatic lesion. Consider pancreas protocol abdomen MRI (which includes MRCP) if there are clinical findings of cholestasis. 6. Large stool volume suggestive of constipation. 7. Trace bilateral pleural effusions, trace ascites, and minimal anasarca suggest volume overload. 8. Incidental findings as above for which no dedicated follow-up is recommended. RECOMMENDATIONS: Incidental Pulmonary Nodules on CT - Solid nodules, single, low risk >8 mm:  Consider CT 3 months, PET/CT, or tissue sampling - Solid nodules, single, high risk >8 mm:  Consider CT 3 months, PET/CT, or tissue sampling Reference:  [place] et al.  Guidelines for management of incidental pulmonary nodules detected on CT images: From the Fleischner Society 2017. Radiology [id number].        Personally reviewed Lab Studies, Imaging, and discussed case with Dr. Aleta Gordillo     Electronically signed by KALI Almanzar CNP on 10/16/2022 at 4:55 PM

## 2022-10-16 NOTE — ED PROVIDER NOTES
Triage Chief Complaint:   Altered Mental Status (Unknown last well known. SNF reports pt has not been acting herself. Pt only responds to pain upon arrival. Eye's opened but not answering any questions. )    [place]:  Crescencio Mejia is a 80 y.o. female that presents from nursing facility for reported altered mental status of unknown duration. Patient was in the emergency department 2 days ago after a fall and reportedly has been having frequent falls. She has a history of dementia and expressive aphasia. Per nursing home report, patient is normally talkative and wheelchair-bound and has seemed more confused recently but last known normal and not able to be ascertained. EMS reports that patient's blood pressure was initially low but they start IV fluids and it has come up to over 440 systolic. Blood glucose was normal prior to arrival.  Patient unable to contribute to history of present illness. 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 No family history on file.   Social History     Socioeconomic History    Marital status: Single     Spouse name: Not on file    Number of children: Not on file    Years of education: Not on file    Highest education level: Not on file   Occupational History    Not on file   Tobacco Use    Smoking status: Former     Types: Cigarettes    Smokeless tobacco: Never   Substance and Sexual Activity    Alcohol use: Not Currently    Drug use: Never    Sexual activity: Not Currently   Other Topics Concern    Not on file   Social History Narrative    Not on file     Social Determinants of Health     Financial Resource Strain: Not on file   Food Insecurity: Not on file   Transportation Needs: Not on file   Physical Activity: Not on file Stress: Not on file   Social Connections: Not on file   Intimate Partner Violence: Not on file   Housing Stability: Not on file     No current facility-administered medications for this encounter. Current Outpatient Medications   Medication Sig Dispense Refill    divalproex (DEPAKOTE SPRINKLE) 125 MG capsule Take 1 capsule by mouth every 8 hours as needed (for agitation) 21 capsule 0    divalproex (DEPAKOTE SPRINKLE) 125 MG capsule Take 1 capsule by mouth Daily with supper for 7 days 7 capsule 0    busPIRone (BUSPAR) 5 MG tablet Take 1 tablet by mouth 3 times daily 90 tablet 1    divalproex (DEPAKOTE SPRINKLE) 125 MG capsule Take 2 capsules by mouth daily 60 capsule 1    divalproex (DEPAKOTE SPRINKLE) 125 MG capsule Take 4 capsules by mouth nightly 120 capsule 1    doxepin (SINEQUAN) 10 MG capsule Take 1 capsule by mouth nightly 30 capsule 1    memantine (NAMENDA) 5 MG tablet Take 1 tablet by mouth daily 30 tablet 1    aspirin 81 MG chewable tablet Take 1 tablet by mouth daily 30 tablet 3    Calcium Carbonate-Vitamin D 500-125 MG-UNIT TABS Take 1 tablet by mouth daily      ezetimibe (ZETIA) 10 MG tablet Take 10 mg by mouth daily      omeprazole (PRILOSEC) 40 MG delayed release capsule Take 40 mg by mouth daily       Allergies   Allergen Reactions    Ativan [Lorazepam] Other (See Comments)     Psychotic Reaction/Combative    Vicodin [Hydrocodone-Acetaminophen] Other (See Comments)     Psychotic reaction    Valium [Diazepam] Other (See Comments)     Ineffective       Nursing Notes Reviewed    Physical Exam:  ED Triage Vitals [10/16/22 1501]   Enc Vitals Group      /60      Heart Rate 88      Resp 19      Temp 98.2 °F (36.8 °C)      Temp Source Oral      SpO2 98 %      Weight       Height       Head Circumference       Peak Flow       Pain Score       Pain Loc       Pain Edu? Excl. in [address]? GENERAL APPEARANCE: Awake. No acute distress. Occasional incoherent speech.   Does not follow simple commands. HEAD: Normocephalic. Right frontal hematoma  EYES: EOM's grossly intact. Sclera anicteric. ENT: Mucous membranes are dry. Tolerates saliva. No trismus. NECK: Supple. No meningismus. Trachea midline. HEART: RRR. Radial pulses 2+. LUNGS: Respirations unlabored. CTAB  ABDOMEN: Soft. Non-tender. No guarding or rebound. EXTREMITIES: No acute deformities. SKIN: Warm and dry. NEUROLOGICAL: No gross facial drooping. Expressive aphasia. Incomprehensible speech. Responds only to painful stimulation. Does move all extremities. No gross sensory deficits. Pupils equal and reactive. No nystagmus or clonus. No gaze palsy. PSYCHIATRIC: Normal mood. I have reviewed and interpreted all of the currently available lab results from this visit (if applicable):  Results for orders placed or performed during the hospital encounter of 10/16/22   Lactic Acid   Result Value Ref Range    Lactate 1.6 0.4 - 2.0 mMOL/L   Blood Gas, Venous   Result Value Ref Range    pH, Ky 7.36 7.32 - 7.42    pCO2, Ky 46 38 - 52 mmHG    pO2, Ky 50 (H) 28 - 48 mmHG    Base Exc, Mixed . 1 0 - 2.3    HCO3, Venous 26.0 (H) 19 - 25 MMOL/L    O2 Sat, Ky 78.6 (H) 50 - 70 %    Comment VBG    CBC with Auto Differential   Result Value Ref Range    WBC 7.1 4.0 - 10.5 K/CU MM    RBC 3.72 (L) 4.2 - 5.4 M/CU MM    Hemoglobin 10.7 (L) 12.5 - 16.0 GM/DL    Hematocrit 34.8 (L) 37 - 47 %    MCV 93.5 78 - 100 FL    MCH 28.8 27 - 31 PG    MCHC 30.7 (L) 32.0 - 36.0 %    RDW 18.0 (H) 11.7 - 14.9 %    Platelets 842 747 - 948 K/CU MM    MPV 9.0 7.5 - 11.1 FL    Differential Type AUTOMATED DIFFERENTIAL     Segs Relative 69.7 (H) 36 - 66 %    Lymphocytes % 15.4 (L) 24 - 44 %    Monocytes % 14.0 (H) 0 - 4 %    Eosinophils % 0.3 0 - 3 %    Basophils % 0.3 0 - 1 %    Segs Absolute 4.9 K/CU MM    Lymphocytes Absolute 1.1 K/CU MM    Monocytes Absolute 1.0 K/CU MM    Eosinophils Absolute 0.0 K/CU MM    Basophils Absolute 0.0 K/CU MM    Nucleated RBC % 0.0 % Total Nucleated RBC 0.0 K/CU MM    Total Immature Neutrophil 0.02 K/CU MM    Immature Neutrophil % 0.3 0 - 0.43 %   Comprehensive Metabolic Panel w/ Reflex to MG   Result Value Ref Range    Sodium 141 135 - 145 MMOL/L    Potassium 4.2 3.5 - 5.1 MMOL/L    Chloride 108 99 - 110 mMol/L    CO2 24 21 - 32 MMOL/L    BUN 17 6 - 23 MG/DL    Creatinine 0.9 0.6 - 1.1 MG/DL    Glucose 104 (H) 70 - 99 MG/DL    Calcium 9.1 8.3 - 10.6 MG/DL    Albumin 3.2 (L) 3.4 - 5.0 GM/DL    Total Protein 6.6 6.4 - 8.2 GM/DL    Total Bilirubin 0.4 0.0 - 1.0 MG/DL    ALT 10 10 - 40 U/L    AST 23 15 - 37 IU/L    Alkaline Phosphatase 82 40 - 129 IU/L    GFR Non-African American >60 >60 mL/min/1.73m2    GFR African American >60 >60 mL/min/1.73m2    Anion Gap 9 4 - 16   Urinalysis   Result Value Ref Range    Color, UA YELLOW YELLOW    Clarity, UA CLEAR CLEAR    Glucose, Urine NEGATIVE NEGATIVE MG/DL    Bilirubin Urine NEGATIVE NEGATIVE MG/DL    Ketones, Urine TRACE (A) NEGATIVE MG/DL    Specific Gravity, UA 1.015 1.001 - 1.035    Blood, Urine NEGATIVE NEGATIVE    pH, Urine 7.5 5.0 - 8.0    Protein, UA NEGATIVE NEGATIVE MG/DL    Urobilinogen, Urine 1.0 0.2 - 1.0 MG/DL    Nitrite Urine, Quantitative NEGATIVE NEGATIVE    Leukocyte Esterase, Urine NEGATIVE NEGATIVE      Radiographs (if obtained):  [] The following radiograph was interpreted by myself in the absence of a radiologist:  [x] Radiologist's Report Reviewed:    EKG (if obtained): (All EKG's are interpreted by myself in the absence of a cardiologist)    MDM:  Plan of care is discussed thoroughly with the patient and family if present. If performed, all imaging and lab work also discussed with patient. All relevant prior results and chart reviewed if available. Patient presents as above. Patient was initially hypertensive but responds well to IV fluids here. Heart rate is normal.  She is not in respiratory distress.   She is fairly obtunded but does respond to painful stimuli, mumbles incoherent speech moves all extremities. No obvious focal deficits on exam.  Stroke alert not initiated as patient does not have a last known well. She does have a history of dementia and expressive aphasia but reportedly is usually more talkative. She has a right frontal hematoma from prior fall but reportedly has been having frequent falls at her facility. She is wheelchair-bound. CT does not show any evidence of acute abnormality. Patient does not appear septic but does appear dehydrated with dry tacky mucous membranes. Metabolic work-up is largely unremarkable for any electrolyte disturbances or acute renal insufficiency. Lactate is normal.  No leukocytosis. No evidence of UTI. No hypercarbia on VBG. Unknown etiology of patient's acute encephalopathy at this time. Most likely related to her history of dementia but plan to admit for observation and further management. Clinical Impression:  1.  Encephalopathy      (Please note that portions of this note may have been completed with a voice recognition program. Efforts were made to edit the dictations but occasionally words are mis-transcribed.)    MD Philippe Mayorga MD  10/16/22 [id number]

## 2022-10-16 NOTE — ED NOTES
[id number] paged hospitalist     Thelma Gonzalez  10/16/22 [id number]    [id number] Dawna CHIN with [facility] [place] returned call      Thelma Gonzalez  10/16/22 1640

## 2022-10-17 LAB
ALBUMIN SERPL-MCNC: 3.7 GM/DL (ref 3.4–5)
ALP BLD-CCNC: 88 IU/L (ref 40–129)
ALT SERPL-CCNC: 13 U/L (ref 10–40)
ANION GAP SERPL CALCULATED.3IONS-SCNC: 9 MMOL/L (ref 4–16)
AST SERPL-CCNC: 28 IU/L (ref 15–37)
BASOPHILS ABSOLUTE: 0 K/CU MM
BASOPHILS RELATIVE PERCENT: 0.5 % (ref 0–1)
BILIRUB SERPL-MCNC: 0.4 MG/DL (ref 0–1)
BUN BLDV-MCNC: 16 MG/DL (ref 6–23)
CALCIUM SERPL-MCNC: 8.9 MG/DL (ref 8.3–10.6)
CHLORIDE BLD-SCNC: 108 MMOL/L (ref 99–110)
CO2: 25 MMOL/L (ref 21–32)
CREAT SERPL-MCNC: 0.6 MG/DL (ref 0.6–1.1)
DIFFERENTIAL TYPE: ABNORMAL
DOSE AMOUNT: ABNORMAL
DOSE TIME: ABNORMAL
EOSINOPHILS ABSOLUTE: 0.1 K/CU MM
EOSINOPHILS RELATIVE PERCENT: 2 % (ref 0–3)
GFR AFRICAN AMERICAN: >60 ML/MIN/1.73M2
GFR NON-AFRICAN AMERICAN: >60 ML/MIN/1.73M2
GLUCOSE BLD-MCNC: 96 MG/DL (ref 70–99)
HCT VFR BLD CALC: 34.9 % (ref 37–47)
HEMOGLOBIN: 10.6 GM/DL (ref 12.5–16)
IMMATURE NEUTROPHIL %: 0.5 % (ref 0–0.43)
LYMPHOCYTES ABSOLUTE: 1 K/CU MM
LYMPHOCYTES RELATIVE PERCENT: 22.3 % (ref 24–44)
MAGNESIUM: 1.8 MG/DL (ref 1.8–2.4)
MCH RBC QN AUTO: 28.6 PG (ref 27–31)
MCHC RBC AUTO-ENTMCNC: 30.4 % (ref 32–36)
MCV RBC AUTO: 94.3 FL (ref 78–100)
MONOCYTES ABSOLUTE: 0.7 K/CU MM
MONOCYTES RELATIVE PERCENT: 14.7 % (ref 0–4)
NUCLEATED RBC %: 0 %
PDW BLD-RTO: 17.5 % (ref 11.7–14.9)
PLATELET # BLD: 140 K/CU MM (ref 140–440)
PMV BLD AUTO: 9 FL (ref 7.5–11.1)
POTASSIUM SERPL-SCNC: 3.4 MMOL/L (ref 3.5–5.1)
RBC # BLD: 3.7 M/CU MM (ref 4.2–5.4)
SEGMENTED NEUTROPHILS ABSOLUTE COUNT: 2.7 K/CU MM
SEGMENTED NEUTROPHILS RELATIVE PERCENT: 60 % (ref 36–66)
SODIUM BLD-SCNC: 142 MMOL/L (ref 135–145)
TOTAL IMMATURE NEUTOROPHIL: 0.02 K/CU MM
TOTAL NUCLEATED RBC: 0 K/CU MM
TOTAL PROTEIN: 6.6 GM/DL (ref 6.4–8.2)
VALPROIC ACID LEVEL: 39.9 UG/ML (ref 50–100)
WBC # BLD: 4.4 K/CU MM (ref 4–10.5)

## 2022-10-17 PROCEDURE — 94761 N-INVAS EAR/PLS OXIMETRY MLT: CPT

## 2022-10-17 PROCEDURE — 36415 COLL VENOUS BLD VENIPUNCTURE: CPT

## 2022-10-17 PROCEDURE — 85025 COMPLETE CBC W/AUTO DIFF WBC: CPT

## 2022-10-17 PROCEDURE — 2580000003 HC RX 258: Performed by: STUDENT IN AN ORGANIZED HEALTH CARE EDUCATION/TRAINING PROGRAM

## 2022-10-17 PROCEDURE — 1200000000 HC SEMI PRIVATE

## 2022-10-17 PROCEDURE — 83735 ASSAY OF MAGNESIUM: CPT

## 2022-10-17 PROCEDURE — G0378 HOSPITAL OBSERVATION PER HR: HCPCS

## 2022-10-17 PROCEDURE — 80164 ASSAY DIPROPYLACETIC ACD TOT: CPT

## 2022-10-17 PROCEDURE — 80053 COMPREHEN METABOLIC PANEL: CPT

## 2022-10-17 RX ORDER — POLYETHYLENE GLYCOL 3350 17 G/17G
17 POWDER, FOR SOLUTION ORAL DAILY
Status: DISCONTINUED | OUTPATIENT
Start: 2022-10-17 | End: 2022-10-20 | Stop reason: HOSPADM

## 2022-10-17 RX ORDER — ENOXAPARIN SODIUM 100 MG/ML
30 INJECTION SUBCUTANEOUS DAILY
Status: DISCONTINUED | OUTPATIENT
Start: 2022-10-18 | End: 2022-10-20 | Stop reason: HOSPADM

## 2022-10-17 RX ORDER — SODIUM CHLORIDE, SODIUM LACTATE, POTASSIUM CHLORIDE, CALCIUM CHLORIDE 600; 310; 30; 20 MG/100ML; MG/100ML; MG/100ML; MG/100ML
INJECTION, SOLUTION INTRAVENOUS CONTINUOUS
Status: DISCONTINUED | OUTPATIENT
Start: 2022-10-17 | End: 2022-10-17

## 2022-10-17 RX ORDER — POTASSIUM CHLORIDE 20 MEQ/1
40 TABLET, EXTENDED RELEASE ORAL ONCE
Status: DISCONTINUED | OUTPATIENT
Start: 2022-10-17 | End: 2022-10-19

## 2022-10-17 RX ORDER — SENNA PLUS 8.6 MG/1
1 TABLET ORAL NIGHTLY
Status: DISCONTINUED | OUTPATIENT
Start: 2022-10-17 | End: 2022-10-20 | Stop reason: HOSPADM

## 2022-10-17 RX ORDER — ENOXAPARIN SODIUM 100 MG/ML
40 INJECTION SUBCUTANEOUS DAILY
Status: DISCONTINUED | OUTPATIENT
Start: 2022-10-18 | End: 2022-10-17

## 2022-10-17 RX ORDER — DEXTROSE MONOHYDRATE 50 MG/ML
INJECTION, SOLUTION INTRAVENOUS CONTINUOUS
Status: ACTIVE | OUTPATIENT
Start: 2022-10-17 | End: 2022-10-18

## 2022-10-17 RX ADMIN — DEXTROSE MONOHYDRATE: 50 INJECTION, SOLUTION INTRAVENOUS at 23:19

## 2022-10-17 RX ADMIN — SODIUM CHLORIDE, POTASSIUM CHLORIDE, SODIUM LACTATE AND CALCIUM CHLORIDE: 600; 310; 30; 20 INJECTION, SOLUTION INTRAVENOUS at 18:10

## 2022-10-17 NOTE — PROGRESS NOTES
V2.0  [facility] [place] Note      Name:  Thiago Khanna /Age/Sex: 1940  (80 y.o. female)   MRN & CSN:  [id number] & [id number] Encounter Date/Time: 10/17/2022 3:39 PM EDT    Location:  -A PCP: Jesus Albreto Browning, [address] [facility] Day: 2      Subjective:     Chief Complaint: Altered Mental Status (Unknown last well known. SNF reports pt has not been acting herself. Pt only responds to pain upon arrival. Eye's opened but not answering any questions. )     No acute events overnight. Patient was more alert today. Was answering all questions with no. Unsure of ROS is reliable. Seen by neurology. No further plans. Pt failed bedside swallow evaluation. SLP was consulted. Assessment and Plan:   Thiago Khanna is a 80 y.o. female with pmh of advanced dementia who presents with Encephalopathy      Plan:  #acute metabolic encephalopathy likely post concussive following fall on 10/14/2022 in the setting of advanced dementia- improving  Pt baseline oriented to person only and talkative but was found obtunded responds to painful stimuli only. Improved today, likely close to baseline. Had fall 10/14 which resulted Multiple compression vertebral fractures  Valproic level low; Depakote unlikely to cause change in her mental status; discussed with neuro. No evidence of infection. Repeat CT head unremarkable for acute changes. - follow up neurology recs; no further imaging needed  --PT/OT/SLP    #Multiple compression fractures T9-T12, L2-L4. Most severely affected level T12 loss 80%. Family was not interested in transfer to trauma center following fall. Pt has no symptoms. No tenderness on exam. No neurological deficits appreciated. - monitor  - may need neurosurgery consult     #0.8 cm solid nodule. Noted on CT thoracic.   - defer management to outpatient if family is interested    #concern for dysphagia.  Failed bedside evaluation.  - NPO  - SLP eval  - will continue gentle hydration D5W      #mild dilation of pancreatic duct. Pt has no symptoms. - may need MRCP outpatient if family is interested    #large stool on CT. - bowel regimen  - consider enema if no bowel movement    #Advance dementia with behavioral disturbance  -Patient is on memantine  - home Depakote resumed  - Redirect/reortient/reassure frequently. - Sleep hygiene with limited daytime sleeping.   - Avoid narcotics, benzodiazepines and anticholinergic medications. #Hypokalemia. Likely 2/2 poor oral intake  - replace as needed     #Normocytic anemia  Hgb stable ~10.  - monitor    GERD  - Continue PPI    Diet Diet NPO Exceptions are: Ice Chips, Sips of Water with Meds, Sips of Clear Liquids   DVT Prophylaxis [x] Lovenox, []  Heparin, [] SCDs, [] Ambulation,  [] Eliquis, [] Xarelto  [] Coumadin   Code Status Prior   Disposition From: [facility]  Expected Disposition: [facility]  Estimated Date of Discharge: 10/18  Patient requires continued admission due to SLP evaluation, mental status not completely back to baseline   Surrogate Decision Maker/ POA      Review of Systems:    Review of Systems        Objective:   No intake or output data in the 24 hours ending 10/17/22 2210     Vitals:   Vitals:    10/17/22 2002   BP: 139/86   Pulse: (!) 101   Resp: 17   Temp: 97.7 °F (36.5 °C)   SpO2: 98%       Physical Exam:     General: NAD  Eyes: EOMI  ENT: neck supple  Cardiovascular: Regular rate. Respiratory: Clear to auscultation  Gastrointestinal: Soft, non tender  Genitourinary: no suprapubic tenderness  Musculoskeletal: no spinal or paraspinal tenderness, No edema  Skin: warm, dry  Neuro: Alert. Moves arms/legs spontaneously  Psych: Mood appropriate.      Medications:   Medications:    potassium chloride  40 mEq Oral Once    polyethylene glycol  17 g Oral Daily    senna  1 tablet Oral Nightly    [START ON 10/18/2022] enoxaparin  40 mg SubCUTAneous Daily    busPIRone  5 mg Oral TID    doxepin  10 mg Oral Nightly ezetimibe  10 mg Oral Daily    memantine  5 mg Oral Daily    lansoprazole  30 mg Oral QAM AC    divalproex  250 mg Oral Daily    divalproex  500 mg Oral Nightly      Infusions:    lactated ringers 75 mL/hr at 10/17/22 1810     PRN Meds:      Labs      Recent Results (from the past 24 hour(s))   CBC with Auto Differential    Collection Time: 10/17/22  9:04 AM   Result Value Ref Range    WBC 4.4 4.0 - 10.5 K/CU MM    RBC 3.70 (L) 4.2 - 5.4 M/CU MM    Hemoglobin 10.6 (L) 12.5 - 16.0 GM/DL    Hematocrit 34.9 (L) 37 - 47 %    MCV 94.3 78 - 100 FL    MCH 28.6 27 - 31 PG    MCHC 30.4 (L) 32.0 - 36.0 %    RDW 17.5 (H) 11.7 - 14.9 %    Platelets 481 758 - 363 K/CU MM    MPV 9.0 7.5 - 11.1 FL    Differential Type AUTOMATED DIFFERENTIAL     Segs Relative 60.0 36 - 66 %    Lymphocytes % 22.3 (L) 24 - 44 %    Monocytes % 14.7 (H) 0 - 4 %    Eosinophils % 2.0 0 - 3 %    Basophils % 0.5 0 - 1 %    Segs Absolute 2.7 K/CU MM    Lymphocytes Absolute 1.0 K/CU MM    Monocytes Absolute 0.7 K/CU MM    Eosinophils Absolute 0.1 K/CU MM    Basophils Absolute 0.0 K/CU MM    Nucleated RBC % 0.0 %    Total Nucleated RBC 0.0 K/CU MM    Total Immature Neutrophil 0.02 K/CU MM    Immature Neutrophil % 0.5 (H) 0 - 0.43 %   Comprehensive Metabolic Panel w/ Reflex to MG    Collection Time: 10/17/22  9:04 AM   Result Value Ref Range    Sodium 142 135 - 145 MMOL/L    Potassium 3.4 (L) 3.5 - 5.1 MMOL/L    Chloride 108 99 - 110 mMol/L    CO2 25 21 - 32 MMOL/L    BUN 16 6 - 23 MG/DL    Creatinine 0.6 0.6 - 1.1 MG/DL    Glucose 96 70 - 99 MG/DL    Calcium 8.9 8.3 - 10.6 MG/DL    Albumin 3.7 3.4 - 5.0 GM/DL    Total Protein 6.6 6.4 - 8.2 GM/DL    Total Bilirubin 0.4 0.0 - 1.0 MG/DL    ALT 13 10 - 40 U/L    AST 28 15 - 37 IU/L    Alkaline Phosphatase 88 40 - 129 IU/L    GFR Non-African American >60 >60 mL/min/1.73m2    GFR African American >60 >60 mL/min/1.73m2    Anion Gap 9 4 - 16   Valproic Acid Level, Total    Collection Time: 10/17/22  9:04 AM   Result Value Ref Range    Valproic Acid Lvl 39.9 (L) 50 - 100 UG/ML    DOSE AMOUNT DOSE AMT. GIVEN - UNKNOWN     DOSE TIME DOSE TIME GIVEN - UNKNOWN    Magnesium    Collection Time: 10/17/22  9:04 AM   Result Value Ref Range    Magnesium 1.8 1.8 - 2.4 mg/dl        Imaging/Diagnostics Last 24 Hours   CT HEAD WO CONTRAST    Result Date: 10/16/2022  EXAMINATION: CT OF THE HEAD WITHOUT CONTRAST  10/16/2022 4:09 pm TECHNIQUE: CT of the head was performed without the administration of intravenous contrast. Automated exposure control, iterative reconstruction, and/or weight based adjustment of the mA/kV was utilized to reduce the radiation dose to as low as reasonably achievable. COMPARISON: 10/14/2022 HISTORY: ORDERING SYSTEM PROVIDED HISTORY: ams TECHNOLOGIST PROVIDED HISTORY: Has a \"code stroke\" or \"stroke alert\" been called? ->No Reason for exam:->ams Decision Support Exception - unselect if not a suspected or confirmed emergency medical condition->Emergency Medical Condition (MA) Reason for Exam: AMS,ADVANCED STAGE OF DEMENTIA,FALL OUT OF BED, GREENISH OLD BRUISING ALL OVER RIGHT EYE AND FOREHEAD FINDINGS: BRAIN/VENTRICLES: The ventricles and sulci are diffusely enlarged. Low attenuation is seen in the periventricular and subcortical white matter. No acute intracranial hemorrhage or acute infarct is identified. ORBITS: The visualized portion of the orbits demonstrate no acute abnormality. SINUSES: The visualized paranasal sinuses and mastoid air cells demonstrate no acute abnormality. SOFT TISSUES/SKULL:  No acute abnormality of the visualized skull or soft tissues. No acute intracranial abnormality.  Diffuse atrophic changes with findings suggesting chronic microvascular ischemia       Electronically signed by Jeri Viera MD on 10/17/2022 at 10:10 PM

## 2022-10-17 NOTE — PROGRESS NOTES
Bedside swallow completed at this time. Patient give 1 ice chip and was able to swallow w/o difficulty. Patient given a sip of water and began coughing. Physician made aware and patient made NPO at this time.

## 2022-10-17 NOTE — PLAN OF CARE
Problem: Discharge Planning  Goal: Discharge to home or other facility with appropriate resources  Outcome: Progressing     Problem: Pain  Goal: Verbalizes/displays adequate comfort level or baseline comfort level  Outcome: Progressing  Flowsheets (Taken 10/17/2022 1016)  Verbalizes/displays adequate comfort level or baseline comfort level:   Encourage patient to monitor pain and request assistance   Assess pain using appropriate pain scale   Administer analgesics based on type and severity of pain and evaluate response     Problem: Skin/Tissue Integrity  Goal: Absence of new skin breakdown  Description: 1. Monitor for areas of redness and/or skin breakdown  2. Assess vascular access sites hourly  3. Every 4-6 hours minimum:  Change oxygen saturation probe site  4. Every 4-6 hours:  If on nasal continuous positive airway pressure, respiratory therapy assess nares and determine need for appliance change or resting period.   Outcome: Progressing

## 2022-10-17 NOTE — CONSULTS
Neurology Service Consult Note  [address]   Patient Name: Mariela Rizzo  : 1940        Subjective:   Reason for consult: AMS  80 y.o. - female with history of ACOM aneurysm, dementia, and expressive aphasia presenting to [place] [address] for AMS. History obtained from chart as patient is unable to provide any pertinent information. Patient was seen and evaluated in the ED on 10/14 after she sustained a fall striking her head. It was recommend she be transferred to a trauma center as imaging of her spine showed multiple compression fractures. She was discharged back to the [facility]. She came back in as she was reported to be altered. She received IVF due to hypotension with reported improvement of her mental status. At baseline it is mentioned patient is alert and talkative however confused. She is wheel chair bound at baseline. Metabolic work up essentially benign. CT of head non acute. Patient seen and examined. She is up in chair. She is alert and confused. No focal findings noted. She is afebrile. Past Medical History:   Diagnosis Date    Anterior communicating artery aneurysm 2020    ESBL (extended spectrum beta-lactamase) producing bacteria infection 2021    urine; 21    Expressive aphasia 2020    :   No past surgical history on file. Medications:  Scheduled Meds:   busPIRone  5 mg Oral TID    doxepin  10 mg Oral Nightly    ezetimibe  10 mg Oral Daily    memantine  5 mg Oral Daily    lansoprazole  30 mg Oral QAM AC    [Held by provider] divalproex  250 mg Oral Daily    [Held by provider] divalproex  500 mg Oral Nightly     Continuous Infusions:  PRN Meds:.     Allergies   Allergen Reactions    Ativan [Lorazepam] Other (See Comments)     Psychotic Reaction/Combative    Vicodin [Hydrocodone-Acetaminophen] Other (See Comments)     Psychotic reaction    Valium [Diazepam] Other (See Comments)     Ineffective     Social History Socioeconomic History    Marital status: Single     Spouse name: Not on file    Number of children: Not on file    Years of education: Not on file    Highest education level: Not on file   Occupational History    Not on file   Tobacco Use    Smoking status: Former     Types: Cigarettes    Smokeless tobacco: Never   Substance and Sexual Activity    Alcohol use: Not Currently    Drug use: Never    Sexual activity: Not Currently   Other Topics Concern    Not on file   Social History Narrative    Not on file     Social Determinants of Health     Financial Resource Strain: Not on file   Food Insecurity: Not on file   Transportation Needs: Not on file   Physical Activity: Not on file   Stress: Not on file   Social Connections: Not on file   Intimate Partner Violence: Not on file   Housing Stability: Not on file      No family history on file. ROS (10 systems)  Unable to completed due to mental status  Physical Exam:       [unfilled]   Wt Readings from Last 3 Encounters:   09/18/22 90 lb 11.2 oz (41.1 kg)   09/02/22 95 lb (43.1 kg)   12/26/20 98 lb (44.5 kg)     Temp Readings from Last 3 Encounters:   10/16/22 98.3 °F (36.8 °C) (Oral)   10/14/22 96.9 °F (36.1 °C) (Axillary)   09/19/22 97.5 °F (36.4 °C) (Axillary)     BP Readings from Last 3 Encounters:   10/16/22 117/65   10/14/22 (!) 140/71   09/19/22 (!) 135/49     Pulse Readings from Last 3 Encounters:   10/16/22 83   10/14/22 89   09/19/22 68        Gen: A&O x 1 NAD, uncooperative  HEENT: NC/AT, EOMI, PERRL, mmm, no carotid bruits, neck supple, no meningeal signs; Heart: SR on monitor  Lungs: Respirations unlabored  Ext: no edema, no calf tenderness b/l  Psych: restless, becomes easily agitated  Skin: healing ecchymosis to right orbital region    NEUROLOGIC EXAM:    Mental Status: A&O to self, . Speech unintelligible. Cranial Nerve Exam:   CN II-XII: grossly intact. No evidence of gaze preference, face is symmetric. Blinks to threat.      Motor Exam: Strong  strength. Antigravity to extremities. Deep Tendon Reflexes: 1/4 biceps, triceps, brachioradialis, patellar, and achilles b/l; flexor plantar responses b/l    Sensation: Intact light touch UE's/LE's b/l    Coordination/Cerebellum:       Tremors--parkinsonian type tremor to UE's      Rapidly alternating movements: YAZ            Heel-to-Shin: YAZ      Finger-to-Nose: YAZ    Gait and stance:      Gait: deferred      LABS:     Recent Labs     10/16/22  1510 10/17/22  0904   WBC 7.1 4.4    142   K 4.2 3.4*    108   CO2 24 25   BUN 17 16   CREATININE 0.9 0.6   GLUCOSE 104* 96   [email or web address]@  [email or web address] TSH Results,[unfilled],     Last Liver Function Results:  @LABRCNTIP(ALT:3,AST:3,BILITOTAL:3,BILIDIR:3,ALKPHOS:3)@          IMAGING:    CT of head:  No acute intracranial abnormality. Diffuse atrophic changes with findings suggesting chronic microvascular   ischemia     Above imaging personally reviewed. ASSESSMENT/PLAN:   This is a 79 y/o female with history of  ACOM aneurysm, dementia, and expressive aphasia presenting with AMS. She is alert but remains confused. She appears delirious this morning. No focal findings noted. AMS likely sequale from recent head injury post fall superimposed on underlying dementia. No clinical suspicion for stroke or seizure. CT of head as above  Do not feel we need to obtain further brain imaging at this time. In addition do not feel she would tolerate  Depakote level subtherapeutic at 39 however she is only on 500 mg daily and would not expect her level to be in the therapeutic range. In addition she is on this for behavior and not seizure. Metabolic work up essentially benign. Nothing further from our standpoint. We will sign off.     > 80 minutes of time spent included chart review, obtaining history, patient examination, developing plan of care, and documentation.       Thank you for allowing us to participate in the care of your patient. If there are any questions regarding evaluation please feel free to contact us.      Mckayla Jean, KALI - CNS, 10/17/2022

## 2022-10-18 LAB
ALBUMIN SERPL-MCNC: 3.6 GM/DL (ref 3.4–5)
ANION GAP SERPL CALCULATED.3IONS-SCNC: 10 MMOL/L (ref 4–16)
BUN BLDV-MCNC: 16 MG/DL (ref 6–23)
CALCIUM SERPL-MCNC: 9 MG/DL (ref 8.3–10.6)
CHLORIDE BLD-SCNC: 107 MMOL/L (ref 99–110)
CO2: 25 MMOL/L (ref 21–32)
CREAT SERPL-MCNC: 0.7 MG/DL (ref 0.6–1.1)
GFR SERPL CREATININE-BSD FRML MDRD: >60 ML/MIN/1.73M2
GLUCOSE BLD-MCNC: 105 MG/DL (ref 70–99)
MAGNESIUM: 1.7 MG/DL (ref 1.8–2.4)
PHOSPHORUS: 2.8 MG/DL (ref 2.5–4.9)
POTASSIUM SERPL-SCNC: 3.6 MMOL/L (ref 3.5–5.1)
SODIUM BLD-SCNC: 142 MMOL/L (ref 135–145)

## 2022-10-18 PROCEDURE — 83735 ASSAY OF MAGNESIUM: CPT

## 2022-10-18 PROCEDURE — G0378 HOSPITAL OBSERVATION PER HR: HCPCS

## 2022-10-18 PROCEDURE — 1200000000 HC SEMI PRIVATE

## 2022-10-18 PROCEDURE — 36415 COLL VENOUS BLD VENIPUNCTURE: CPT

## 2022-10-18 PROCEDURE — 6360000002 HC RX W HCPCS: Performed by: STUDENT IN AN ORGANIZED HEALTH CARE EDUCATION/TRAINING PROGRAM

## 2022-10-18 PROCEDURE — 92610 EVALUATE SWALLOWING FUNCTION: CPT

## 2022-10-18 PROCEDURE — 99222 1ST HOSP IP/OBS MODERATE 55: CPT | Performed by: PHYSICIAN ASSISTANT

## 2022-10-18 PROCEDURE — 6370000000 HC RX 637 (ALT 250 FOR IP): Performed by: STUDENT IN AN ORGANIZED HEALTH CARE EDUCATION/TRAINING PROGRAM

## 2022-10-18 PROCEDURE — 6370000000 HC RX 637 (ALT 250 FOR IP)

## 2022-10-18 PROCEDURE — 80069 RENAL FUNCTION PANEL: CPT

## 2022-10-18 PROCEDURE — 94761 N-INVAS EAR/PLS OXIMETRY MLT: CPT

## 2022-10-18 RX ORDER — POTASSIUM CHLORIDE 20 MEQ/1
40 TABLET, EXTENDED RELEASE ORAL 2 TIMES DAILY WITH MEALS
Status: DISCONTINUED | OUTPATIENT
Start: 2022-10-18 | End: 2022-10-19

## 2022-10-18 RX ORDER — MAGNESIUM SULFATE IN WATER 40 MG/ML
2000 INJECTION, SOLUTION INTRAVENOUS ONCE
Status: COMPLETED | OUTPATIENT
Start: 2022-10-18 | End: 2022-10-18

## 2022-10-18 RX ADMIN — BUSPIRONE HYDROCHLORIDE 5 MG: 5 TABLET ORAL at 21:03

## 2022-10-18 RX ADMIN — MAGNESIUM SULFATE HEPTAHYDRATE 2000 MG: 2 INJECTION, SOLUTION INTRAVENOUS at 16:25

## 2022-10-18 RX ADMIN — DOXEPIN HYDROCHLORIDE 10 MG: 10 CAPSULE ORAL at 21:04

## 2022-10-18 RX ADMIN — DIVALPROEX SODIUM 500 MG: 125 CAPSULE, COATED PELLETS ORAL at 21:03

## 2022-10-18 RX ADMIN — SENNOSIDES 8.6 MG: 8.6 TABLET, FILM COATED ORAL at 21:04

## 2022-10-18 NOTE — CONSULTS
Neurosurgery   Consult Note      Reason for Consult: multilevel acute and chronic thoracolumbar fractures  Consulting Physician: Yoni Jimenez MD  Attending Physician: Yoni Jimenez MD  Date of Admission: 10/16/2022  Subjective:   CHIEF COMPLAINT: Encephalopathy    HPI:  80 y.o. 1940  Who presented to the ED 10/16/22 with concerns of encephalopathy from her nursing home. She has advanced dementia and is expressive aphasic at baseline, however staff felt that she was not as responsive as she usually is. She was seen 2 days earlier in the ER in 10/14 after being found down on the floor. There was concern that she had hit her head, she had a laceration that needed suturing. Head CT was nonacute, patient was found to have multiple thoracolumbar fractures, family declined transferring to trauma center at that time. She was returned to her nursing facility. Our service was consulted to follow-up on these fractures. Patient is not able to provide a history. I did contact her legal guardian and ask permission to contact her granddaughter to discuss her history. Consent obtained. Her granddaughter states that the patient previously lived in [place] and had fallen off of her patio. She was seen by a spine doctor at that time who told her that the fractures would heal on their own. When the patient came to [place] to live with her granddaughter, she took her to see a spine doctor who gave her a TLSO brace. The family had difficulty caring for her as patient would attempt to run away multiple times while the granddaughter was at work. The granddaughter reports providing the TLSO brace to her nursing facility. She states that her grandma would only wear it for short period of time before taking it off. Prior to this hospitalization she was ambulatory.   The granddaughter states that she was not very verbal prior to the hospitalization and that her dementia has worsened over the past few months. Patient is not on any antiplatelets or anticoagulants. PMHx positive for anterior communicating artery aneurysm, ESBL, expressive aphasia. PSHx positive not listed in chart. Past Medical and Surgical History:       Diagnosis Date    Anterior communicating artery aneurysm 06/09/2020    ESBL (extended spectrum beta-lactamase) producing bacteria infection 08/19/2021    urine; 8/23/21    Expressive aphasia 06/09/2020     No past surgical history on file. Social History:    TOBACCO:   reports that she has quit smoking. Her smoking use included cigarettes. She has never used smokeless tobacco.  ETOH:   reports that she does not currently use alcohol. Family History:   No family history on file.     Current Medications:    Current Facility-Administered Medications: potassium chloride (KLOR-CON M) extended release tablet 40 mEq, 40 mEq, Oral, Once  polyethylene glycol (GLYCOLAX) packet 17 g, 17 g, Oral, Daily  senna (SENOKOT) tablet 8.6 mg, 1 tablet, Oral, Nightly  enoxaparin Sodium (LOVENOX) injection 30 mg, 30 mg, SubCUTAneous, Daily  busPIRone (BUSPAR) tablet 5 mg, 5 mg, Oral, TID  doxepin (SINEQUAN) capsule 10 mg, 10 mg, Oral, Nightly  ezetimibe (ZETIA) tablet 10 mg, 10 mg, Oral, Daily  memantine (NAMENDA) tablet 5 mg, 5 mg, Oral, Daily  lansoprazole suspension SUSP 30 mg, 30 mg, Oral, QAM AC  divalproex (DEPAKOTE SPRINKLE) capsule 250 mg, 250 mg, Oral, Daily  divalproex (DEPAKOTE SPRINKLE) capsule 500 mg, 500 mg, Oral, Nightly    Allergies   Allergen Reactions    Ativan [Lorazepam] Other (See Comments)     Psychotic Reaction/Combative    Vicodin [Hydrocodone-Acetaminophen] Other (See Comments)     Psychotic reaction    Valium [Diazepam] Other (See Comments)     Ineffective        REVIEW OF SYSTEMS:    Unable to perform due to AMS    Objective:   PHYSICAL EXAM:      VITALS:  /61   Pulse 88   Temp 98.3 °F (36.8 °C) (Axillary)   Resp 19   Wt 90 lb 6.4 oz (41 kg)   SpO2 98% BMI 16.02 kg/m²      24HR INTAKE/OUTPUT:  No intake or output data in the 24 hours ending 10/18/22 0852  CONSTITUTIONAL:  Awake, alert, no apparent distress, and appears stated age  HEENT: NCAT, PERRL, EOMI. Sclera white, conjunctive full. PSYCHIATRIC: Oriented to person. No obvious depression or anxiety. MUSCULOSKELETAL: No obvious misalignment or effusion of the joints. No clubbing, cyanosis of the digits. SKIN:  normal skin color, texture, turgor and no redness, warmth, or swelling. NEUROLOGIC: Alert to self, face symmetrical, no obvious droop, one-word answers clear, does not follow commands, slight tremor noted in right upper extremity, knees flexed slightly, slight withdrawal to pain in bilateral lower extremities, no clonus bilaterally      DATA:    Old records have been reviewed    CBC:  Recent Labs     10/16/22  1510 10/17/22  0904   WBC 7.1 4.4   RBC 3.72* 3.70*   HGB 10.7* 10.6*   HCT 34.8* 34.9*    140   MCV 93.5 94.3   MCH 28.8 28.6   MCHC 30.7* 30.4*   RDW 18.0* 17.5*   SEGSPCT 69.7* 60.0      BMP:  Recent Labs     10/16/22  1510 10/17/22  0904 10/18/22  0035    142 142   K 4.2 3.4* 3.6    108 107   CO2 24 25 25   BUN 17 16 16   CREATININE 0.9 0.6 0.7   CALCIUM 9.1 8.9 9.0   GLUCOSE 104* 96 105*        Radiology Review:  All pertinent images / reports were reviewed as a part of this visit. CT head 10/14/22  Impression   No acute intracranial abnormality. Stable chronic findings to include anterior communicating artery aneurysm. Mild soft tissue swelling to right frontal scalp anteriorly and to a lesser   extent posterior right parietal scalp concerning for soft tissue contusions. No acute bony abnormality. CT cervical spine 10/14/22  Impression   No acute abnormality of the cervical spine. CT chest, abd/pelvis, thoracic reconstruction 10/14/22  Impression   1. Subacute appearing T9 burst fracture with mild to moderate height loss.    Posterior wall fragments are retropulsed by up to 0.3 cm with no significant   associated spinal canal stenosis. Of note, a chronic appearing T11 superior   endplate compression deformity and a chronic appearing T12 burst deformity   are also present. 2. Subacute appearing fractures of the left 3rd rib and right 9th and 11th   ribs. 3. Subcutaneous contusion and hematoma lateral to the right hip. Otherwise   no evident acute injury of the abdomen or pelvis. 4. A 0.8 cm solid nodule in the left lower lobe has indeterminate features   but is more suggestive of a a neoplasm than a sequela of an infectious or   inflammatory process. Recommend follow-up as below. Of note, the below   recommendations would not apply in the setting prior malignancy not reported   in the electronic medical record. 5. Moderate extrahepatic and mild intrahepatic biliary dilation are likely   related to cholecystectomy and age. However, there is also mild dilation of   the main pancreatic duct with no definite visualization of an obstructing   ampullary or pancreatic lesion. Consider pancreas protocol abdomen MRI   (which includes MRCP) if there are clinical findings of cholestasis. 6. Large stool volume suggestive of constipation. 7. Trace bilateral pleural effusions, trace ascites, and minimal anasarca   suggest volume overload. 8. Incidental findings as above for which no dedicated follow-up is   recommended. RECOMMENDATIONS:   Incidental Pulmonary Nodules on CT       - Solid nodules, single, low risk       >8 mm:  Consider CT 3 months, PET/CT, or tissue sampling       - Solid nodules, single, high risk       >8 mm:  Consider CT 3 months, PET/CT, or tissue sampling       Reference:  Neri et al.  Guidelines for management of incidental   pulmonary nodules detected on CT images: From the Fleischner Society 2017. Radiology 6770;859:152-328.      CT lumbar spine 10/14/22  Impression   Multiple compression fractures identified at T9, T11, T12, L2, L3, and at L4. The most severely affected level is at T12 with loss of 80% of vertebral body   height. These fractures are likely old though MRI of the lumbar spine may be   considered for confirmation. Sclerotic lesion within the left iliac bone new left SI joint. While a   benign fibro-osseous lesion is suspected, metastasis is not excluded. Follow-up bone scan may be considered non emergently. Assessment:   T9, T11, T12, L2, L3, and L4 compression fractures  T12 fracture, 80% loss of height  Sclerotic lesion in left iliac bone   Metabolic Encephalopathy  Dementia  Plan:   Patient who presents to the ER 10/16/2022 from her nursing facility with concerns of cephalopathy. Patient has advanced dementia at baseline and is somewhat nonverbal.  Granddaughter who is providing the history for me via telephone states that her dementia has worsened over the past few months. The only injury that the granddaughter can recall is when the patient fell off her patio in [place] prior to living here in [place] with her. She does not recall any other falls or traumas outside of her being seen in the ER [facility] in [facility] 10/14/2022 for a fall. Patient is tender to palpation at roughly T9. Her CT scan obtained 10/14/2022 shows multiple compression fractures T9, T11, T12, L2, L3, L4. T12 is the most severe with 80% loss of vertebral body height. T9 fracture is listed as subacute to chronic with 0.3 cm of retropulsion. Patient would need MRI to truly gauge acuity of fractures. Low likelihood that patient would lay flat for this, patient's granddaughter states that she is claustrophobic. Patient has an adverse reaction to Ativan. I will reorder CTs of the thoracic and lumbar spine. Discussed with the granddaughter that we will brace these and treat as new fractures as patient is tender midline at T9. She understands and is in agreement with the plan.   Patient's granddaughter states that she does have a TLSO brace at her nursing facility. Electronically signed by Loretta Zacarias PA-C on 10/18/2022 at 8:52 AM    Supervising physician: Klaus Lunsford. Kalen Solano MD.  Dr. Kalen Solano was readily and continuously available by phone for direct consultation regarding the care of this patient. Time spent with patient in consultation, education, and collaboration with medical time is >50% of total time spent on case, including time spent in chart review and dictation. Total time spent: 40 minutes    Thank you for the opportunity to participate in the care of your patient.

## 2022-10-18 NOTE — PROGRESS NOTES
LOVENOX PROPHYLAXIS EVALUATION    Wt Readings from Last 3 Encounters:   10/17/22 90 lb 13.3 oz (41.2 kg)   09/18/22 90 lb 11.2 oz (41.1 kg)   09/02/22 95 lb (43.1 kg)       Estimated Creatinine Clearance: 49 mL/min (based on SCr of 0.6 mg/dL).   Recent Labs     10/16/22  1510 10/17/22  0904   BUN 17 16   CREATININE 0.9 0.6    140   HGB 10.7* 10.6*   HCT 34.8* 34.9*       Weight Range: 50.9 kg and below    CRCL = 30 or greater    50.9 kg   and below     .9  kg   101-150.9 kg   151-174.9  kg   175 kg  or greater     30mg subq  daily     40mg subq daily    (or 30mg subq BID orthopedic)     30mg subq  BID   40mg subq  BID   60mg subq BID       Per P/T protocol for appropriate subq anticoagulation by weight and CRCL change to:    Enoxparin 30mg subq daily      Goldie Murrieta, [facility]  10:56 PM  10/17/22

## 2022-10-18 NOTE — PLAN OF CARE
Problem: Discharge Planning  Goal: Discharge to home or other facility with appropriate resources  10/17/2022 2238 by Nico Chan RN  Outcome: Progressing  Flowsheets (Taken 10/17/2022 2002)  Discharge to home or other facility with appropriate resources: Identify barriers to discharge with patient and caregiver  10/17/2022 1240 by Lisa Watts RN  Outcome: Progressing     Problem: Pain  Goal: Verbalizes/displays adequate comfort level or baseline comfort level  10/17/2022 2238 by Nico Chan RN  Outcome: Progressing  Flowsheets (Taken 10/17/2022 2002)  Verbalizes/displays adequate comfort level or baseline comfort level: Encourage patient to monitor pain and request assistance  10/17/2022 1240 by Lisa Watts RN  Outcome: Progressing  Flowsheets (Taken 10/17/2022 1016)  Verbalizes/displays adequate comfort level or baseline comfort level:   Encourage patient to monitor pain and request assistance   Assess pain using appropriate pain scale   Administer analgesics based on type and severity of pain and evaluate response     Problem: Skin/Tissue Integrity  Goal: Absence of new skin breakdown  Description: 1. Monitor for areas of redness and/or skin breakdown  2. Assess vascular access sites hourly  3. Every 4-6 hours minimum:  Change oxygen saturation probe site  4. Every 4-6 hours:  If on nasal continuous positive airway pressure, respiratory therapy assess nares and determine need for appliance change or resting period.   10/17/2022 2238 by Nico Chan RN  Outcome: Progressing  10/17/2022 1240 by Lisa Watts RN  Outcome: Progressing

## 2022-10-18 NOTE — CARE COORDINATION
Chart reviewed. Patient is unable to participate. Per last CM note- patient was admitted to [facility] (9/19/22) Contacted [facility]; spoke to [place]. Patient was discharged to [address] [place] [address] (\"2 weeks ago\") per family decision. Will reach out to family for update. Sarah Metcalf RN     Per chart- Blayne Liner is legal guardian. Attempted to contact Blayne Liner; [facility].  Sarah Metcalf RN

## 2022-10-18 NOTE — PROGRESS NOTES
Speech Language Pathology  Facility/Department: [facility] [place] [place]   CLINICAL BEDSIDE SWALLOW EVALUATION    NAME: Arden Castillo  : 1940  MRN: [id number]  ADMISSION DATE: 10/16/2022    Impressions:  Arden Castillo was seen for a bedside swallow evaluation after being admitted to [facility] with acute encephalotomy. Medical hx includes advanced dementia, expressive aphasia, ESBL, anterior communicating artery aneurism. No prior hx of dysphagia. Per radiologist CT reveals no intracranial abnormalities. Arden Castillo was seated upright in bed, confused. Oral [facility] exam limited d/t difficulty following directions, no asymmetry identified. Pt did not benefit from verbal, tactile, and visual cues to follow directions. PO trials of thins via cup/straw, nectar thick liquids via straw, puree, and minced and moist solids were given. Oral stage appears mild-moderately impaired characterized by reduced labial coordination, reduced bolus mastication suspect d/t cognitive deficits, intact AP transit, and adequate oral clearance. Pharyngeal stage appears mild-moderately impaired characterized by suspected reduced laryngeal vestibular closure and premature pharygeal entry with thin liquids. Cough observed with trials of thin liquids 3/5 times. No s/s of aspiration were observed with nectar thick liquids, puree and minced and moist solids. Recommendations:  Puree and nectar thick liquid diet is recommended. Pt would benefit from full feed with strict aspiration precautions. Results d/w RN. SLP will continue to follow for diet monitoring/ possible advancement. ADMITTING DIAGNOSIS: has AMS (altered mental status); Severe malnutrition ([address] Expressive aphasia; Anterior communicating artery aneurysm; Advanced dementia; Subcortical vascular dementia with behavioral disturbance;  Other sequelae of other cerebrovascular disease; and Encephalopathy on their problem list.  ONSET DATE: this admission    Recent Chest Xray/CT of Chest: see chart    Date of Eval: 10/18/2022  Evaluating Therapist: Elaine Melgar    Current Diet level:  Current Diet : NPO      Primary Complaint       Pain:  Pain Assessment  Pain Assessment: Face, Legs, Activity, Cry, and Consolability (FLACC)  Faces, Legs, Activity, Cry, and Consolability (FLACC)  Face (F): no particular expression or smile  Legs (L): normal position or relaxed  Activity (A): lying quietly, normal position, moves easily  Cry (C): no cry (awake or asleep)  Consolability (C): content, relaxed  FLACC Score : 0    Reason for Referral  Michelle Armendariz was referred for a bedside swallow evaluation to assess the efficiency of her swallow function, identify signs and symptoms of aspiration and make recommendations regarding safe dietary consistencies, effective compensatory strategies, and safe eating environment. Impression  Dysphagia Diagnosis: Mild to moderate pharyngeal stage dysphagia  Dysphagia Outcome Severity Scale: Level 4: Mild moderate dysphagia- Intermittent supervision/cueing. One - two diet consistencies restricted     Treatment Plan  Requires SLP Intervention: Yes  Duration of Treatment: LOS          Recommended Diet and Intervention          Compensatory Swallowing Strategies       Treatment/Goals  Short-term Goals  Timeframe for Short-term Goals: LOS  Goal 1: Pt will tolerate diet of puree and necar thick liquids with no s/s of aspiration 100% of the time. Long-term Goals  Timeframe for Long-term Goals: LOS    General  Behavior/Cognition: Alert;Confused; Distractible;Requires cueing;Doesn't follow directions  Respiratory Status: Room air  O2 Device: None (Room air)  Communication Observation:  (Cognitive communication deficits)  Follows Directions: None  Dentition: Edentulous  Patient Positioning: Upright in bed  Prior Dysphagia History: none  Consistencies Administered: Minced and Moist;Pureed;Thin;Thin - cup; Thin - straw;Mildly Thick - straw        Vision/Hearing  Vision  Vision: Within Functional Limits  Hearing  Hearing: Exceptions to [facility]  Hearing Exceptions: Hard of hearing/hearing concerns    Oral Motor Deficits  Oral/Motor  Oral Hygiene: Dried secretions    Oral Phase Dysfunction  Oral Phase  Oral Phase: Exceptions  Oral Phase  Oral Phase - Comment: Limited d/t cognition     Indicators of Pharyngeal Phase Dysfunction   Pharyngeal Phase   Pharyngeal Phase: Mild-moderate pharyngeal dysphagia    Prognosis  Individuals consulted  Consulted and agree with results and recommendations: RN;Patient    Education  Patient Education: Results/recommendation  Patient Education Response: No evidence of learning  Safety Devices in place: Yes  Type of devices: All fall risk precautions in place; Left in bed       Therapy Time  SLP Individual Minutes  Time In: 1963  Time Out: 0365  Minutes: 20         Graduate Clinician Curt Méndez  10/18/2022 10:37 AM

## 2022-10-18 NOTE — PROGRESS NOTES
V2.0  [facility] Hospitalist Progress Note      Name:  Yoly Goldman /Age/Sex: 1940  (80 y.o. female)   MRN & CSN:  [id number] & [id number] Encounter Date/Time: 10/18/2022 8:18 AM EDT    Location:  -A PCP: Boris Gramajo [address] [facility] Day: 3    Assessment and Plan:   Yoly Goldman is a 80 y.o. female with pmh of ACOM aneurysm, dementia, expressive aphasia and ESBL UTI who presents with Encephalopathy in the setting of traumatic fall. Neurosurgery consulted this AM in the setting of vertebral fractures post fall. Plan:  #acute metabolic encephalopathy likely post concussive following fall on 10/14/2022 in the setting of advanced dementia- improving  --Pt baseline usually oriented to person only and talkative. This AM, she is silent and not as interactive. Continue to monitor closely  --No evidence of infection. CT head from admission unremarkable for acute changes. --Neuro was consulted and felt the encephalopathy was in the setting of the trauma, Depakote was subtherapeutic, continue to monitor levels, unlikely cause of AMS. No further imaging recommended at this time  --PT/OT/SLP     #Multiple compression fractures T9-T12, L2-L4.   --Fractures appear to be acute vs subacute vs chronic including compression vs traumatic. Family was not interested in transfer to trauma center following fall. --Unable to ascertain whether patient feels any tenderness on exam. She is not withdrawing to pain and is not exclaiming or refusing to move on exam  --Neurosurgery consulted, appreciate recs    #Concern for dysphagia  --Failed bedside evaluation, SLP eval pending  --Continue gentle hydration D5W, advance diet as appropriate pending recs    #Advance dementia with behavioral disturbance  --Continue memantine and depakote  -- Redirect/reorient/reassure frequently. --Sleep hygiene with limited daytime sleeping.    --Avoid narcotics, benzodiazepines and anticholinergic medications. #GERD  -Continue PPI    #Hypokalemia  #Hypomagnesemia  --Likely 2/2 poor oral intake  --Replete as necessary and monitor    #Large stool on CT. -#Cotinue bowel regimen, add on enema as needed    #0.8 cm solid nodule. Noted on CT thoracic. --Defer management to outpatient if family is interested     #Mild dilation of pancreatic duct. --Pt has no symptoms, may need MRCP outpatient if family is interested     Diet Diet NPO Exceptions are: Ice Chips, Sips of Water with Meds, Sips of Clear Liquids   DVT Prophylaxis [x] Lovenox, []  Heparin, [] SCDs, [] Ambulation,  [] Eliquis, [] Xarelto  [] Coumadin   Code Status Prior   Disposition From: Nursing home  Expected Disposition: Nursing home  Estimated Date of Discharge: 1 day  Patient requires continued admission due to Neurosurgery eval   Surrogate Decision Maker/ POA      Subjective:     Chief Complaint: AMS from NH       Patient examined at bedside getting diaper changed. She is quiet and uninteractive throughout. She doesn't appear to be in pain and allows herself to be rolled from side to side for her change with no grimacing or outward expressions of pain      Review of Systems:    Review of Systems   Reason unable to perform ROS: Patient has h/o dementia and reported incoherent speech. She is quiet this morning, unresponsive to questions. Objective:   No intake or output data in the 24 hours ending 10/18/22 0818     Vitals:   Vitals:    10/18/22 0749   BP:    Pulse:    Resp:    Temp:    SpO2: 98%       Physical Exam:   General: NAD, patient is not interactive and doesn't respond to greeting or questions. Eyes: EOMI  HENT: Normocephalic, old bruises and bump on her forehead, around the eyes and cheekbones  Cardiovascular: Regular rate.   Respiratory: Clear to auscultation  Gastrointestinal: Soft, non tender  Genitourinary: no suprapubic tenderness  Musculoskeletal: No edema  Skin: warm, dry  Neuro: Alert but she is not interactive, unable to assess orientation. Psych: Unable to assess.      Medications:   Medications:    potassium chloride  40 mEq Oral Once    polyethylene glycol  17 g Oral Daily    senna  1 tablet Oral Nightly    enoxaparin  30 mg SubCUTAneous Daily    busPIRone  5 mg Oral TID    doxepin  10 mg Oral Nightly    ezetimibe  10 mg Oral Daily    memantine  5 mg Oral Daily    lansoprazole  30 mg Oral QAM AC    divalproex  250 mg Oral Daily    divalproex  500 mg Oral Nightly      Infusions:    dextrose 75 mL/hr at 10/17/22 2319     PRN Meds:      Labs      Recent Results (from the past 24 hour(s))   CBC with Auto Differential    Collection Time: 10/17/22  9:04 AM   Result Value Ref Range    WBC 4.4 4.0 - 10.5 K/CU MM    RBC 3.70 (L) 4.2 - 5.4 M/CU MM    Hemoglobin 10.6 (L) 12.5 - 16.0 GM/DL    Hematocrit 34.9 (L) 37 - 47 %    MCV 94.3 78 - 100 FL    MCH 28.6 27 - 31 PG    MCHC 30.4 (L) 32.0 - 36.0 %    RDW 17.5 (H) 11.7 - 14.9 %    Platelets 284 624 - 033 K/CU MM    MPV 9.0 7.5 - 11.1 FL    Differential Type AUTOMATED DIFFERENTIAL     Segs Relative 60.0 36 - 66 %    Lymphocytes % 22.3 (L) 24 - 44 %    Monocytes % 14.7 (H) 0 - 4 %    Eosinophils % 2.0 0 - 3 %    Basophils % 0.5 0 - 1 %    Segs Absolute 2.7 K/CU MM    Lymphocytes Absolute 1.0 K/CU MM    Monocytes Absolute 0.7 K/CU MM    Eosinophils Absolute 0.1 K/CU MM    Basophils Absolute 0.0 K/CU MM    Nucleated RBC % 0.0 %    Total Nucleated RBC 0.0 K/CU MM    Total Immature Neutrophil 0.02 K/CU MM    Immature Neutrophil % 0.5 (H) 0 - 0.43 %   Comprehensive Metabolic Panel w/ Reflex to MG    Collection Time: 10/17/22  9:04 AM   Result Value Ref Range    Sodium 142 135 - 145 MMOL/L    Potassium 3.4 (L) 3.5 - 5.1 MMOL/L    Chloride 108 99 - 110 mMol/L    CO2 25 21 - 32 MMOL/L    BUN 16 6 - 23 MG/DL    Creatinine 0.6 0.6 - 1.1 MG/DL    Glucose 96 70 - 99 MG/DL    Calcium 8.9 8.3 - 10.6 MG/DL    Albumin 3.7 3.4 - 5.0 GM/DL    Total Protein 6.6 6.4 - 8.2 GM/DL    Total Bilirubin 0.4 0.0 - 1.0 MG/DL    ALT 13 10 - 40 U/L    AST 28 15 - 37 IU/L    Alkaline Phosphatase 88 40 - 129 IU/L    GFR Non-African American >60 >60 mL/min/1.73m2    GFR African American >60 >60 mL/min/1.73m2    Anion Gap 9 4 - 16   Valproic Acid Level, Total    Collection Time: 10/17/22  9:04 AM   Result Value Ref Range    Valproic Acid Lvl 39.9 (L) 50 - 100 UG/ML    DOSE AMOUNT DOSE AMT. GIVEN - UNKNOWN     DOSE TIME DOSE TIME GIVEN - UNKNOWN    Magnesium    Collection Time: 10/17/22  9:04 AM   Result Value Ref Range    Magnesium 1.8 1.8 - 2.4 mg/dl   Renal Function Panel    Collection Time: 10/18/22 12:35 AM   Result Value Ref Range    Sodium 142 135 - 145 MMOL/L    Potassium 3.6 3.5 - 5.1 MMOL/L    Chloride 107 99 - 110 mMol/L    CO2 25 21 - 32 MMOL/L    Anion Gap 10 4 - 16    BUN 16 6 - 23 MG/DL    Creatinine 0.7 0.6 - 1.1 MG/DL    Est, Glom Filt Rate >60 >60 mL/min/1.73m2    Glucose 105 (H) 70 - 99 MG/DL    Calcium 9.0 8.3 - 10.6 MG/DL    Albumin 3.6 3.4 - 5.0 GM/DL    Phosphorus 2.8 2.5 - 4.9 MG/DL   Magnesium    Collection Time: 10/18/22 12:35 AM   Result Value Ref Range    Magnesium 1.7 (L) 1.8 - 2.4 mg/dl        Imaging/Diagnostics Last 24 Hours   CT HEAD WO CONTRAST    Result Date: 10/16/2022  EXAMINATION: CT OF THE HEAD WITHOUT CONTRAST  10/16/2022 4:09 pm TECHNIQUE: CT of the head was performed without the administration of intravenous contrast. Automated exposure control, iterative reconstruction, and/or weight based adjustment of the mA/kV was utilized to reduce the radiation dose to as low as reasonably achievable. COMPARISON: 10/14/2022 HISTORY: ORDERING SYSTEM PROVIDED HISTORY: ams TECHNOLOGIST PROVIDED HISTORY: Has a \"code stroke\" or \"stroke alert\" been called? ->No Reason for exam:->ams Decision Support Exception - unselect if not a suspected or confirmed emergency medical condition->Emergency Medical Condition (MA) Reason for Exam: AMS,ADVANCED STAGE OF DEMENTIA,FALL OUT OF BED, GREENISH OLD BRUISING ALL OVER RIGHT EYE AND FOREHEAD FINDINGS: BRAIN/VENTRICLES: The ventricles and sulci are diffusely enlarged. Low attenuation is seen in the periventricular and subcortical white matter. No acute intracranial hemorrhage or acute infarct is identified. ORBITS: The visualized portion of the orbits demonstrate no acute abnormality. SINUSES: The visualized paranasal sinuses and mastoid air cells demonstrate no acute abnormality. SOFT TISSUES/SKULL:  No acute abnormality of the visualized skull or soft tissues. No acute intracranial abnormality.  Diffuse atrophic changes with findings suggesting chronic microvascular ischemia       Electronically signed by Harjit Villanueva MD on 10/18/2022 at 8:18 AM

## 2022-10-19 ENCOUNTER — APPOINTMENT (OUTPATIENT)
Dept: CT IMAGING | Age: 82
DRG: 884 | End: 2022-10-19
Payer: COMMERCIAL

## 2022-10-19 ENCOUNTER — TELEPHONE (OUTPATIENT)
Dept: NEUROSURGERY | Age: 82
End: 2022-10-19

## 2022-10-19 DIAGNOSIS — S22.078A OTHER CLOSED FRACTURE OF NINTH THORACIC VERTEBRA, INITIAL ENCOUNTER (HCC): Primary | ICD-10-CM

## 2022-10-19 LAB
ANION GAP SERPL CALCULATED.3IONS-SCNC: 11 MMOL/L (ref 4–16)
BUN BLDV-MCNC: 11 MG/DL (ref 6–23)
CALCIUM SERPL-MCNC: 8.7 MG/DL (ref 8.3–10.6)
CHLORIDE BLD-SCNC: 102 MMOL/L (ref 99–110)
CO2: 24 MMOL/L (ref 21–32)
CREAT SERPL-MCNC: 0.6 MG/DL (ref 0.6–1.1)
GFR SERPL CREATININE-BSD FRML MDRD: >60 ML/MIN/1.73M2
GLUCOSE BLD-MCNC: 89 MG/DL (ref 70–99)
MAGNESIUM: 1.9 MG/DL (ref 1.8–2.4)
PHOSPHORUS: 3.4 MG/DL (ref 2.5–4.9)
POTASSIUM SERPL-SCNC: 3 MMOL/L (ref 3.5–5.1)
SODIUM BLD-SCNC: 137 MMOL/L (ref 135–145)

## 2022-10-19 PROCEDURE — 6370000000 HC RX 637 (ALT 250 FOR IP): Performed by: STUDENT IN AN ORGANIZED HEALTH CARE EDUCATION/TRAINING PROGRAM

## 2022-10-19 PROCEDURE — 92526 ORAL FUNCTION THERAPY: CPT

## 2022-10-19 PROCEDURE — 36415 COLL VENOUS BLD VENIPUNCTURE: CPT

## 2022-10-19 PROCEDURE — 80048 BASIC METABOLIC PNL TOTAL CA: CPT

## 2022-10-19 PROCEDURE — 6370000000 HC RX 637 (ALT 250 FOR IP)

## 2022-10-19 PROCEDURE — 72128 CT CHEST SPINE W/O DYE: CPT

## 2022-10-19 PROCEDURE — 94761 N-INVAS EAR/PLS OXIMETRY MLT: CPT

## 2022-10-19 PROCEDURE — 1200000000 HC SEMI PRIVATE

## 2022-10-19 PROCEDURE — 84100 ASSAY OF PHOSPHORUS: CPT

## 2022-10-19 PROCEDURE — 72131 CT LUMBAR SPINE W/O DYE: CPT

## 2022-10-19 PROCEDURE — 6360000002 HC RX W HCPCS: Performed by: STUDENT IN AN ORGANIZED HEALTH CARE EDUCATION/TRAINING PROGRAM

## 2022-10-19 PROCEDURE — 83735 ASSAY OF MAGNESIUM: CPT

## 2022-10-19 PROCEDURE — 99232 SBSQ HOSP IP/OBS MODERATE 35: CPT | Performed by: PHYSICIAN ASSISTANT

## 2022-10-19 RX ORDER — POTASSIUM CHLORIDE 7.45 MG/ML
10 INJECTION INTRAVENOUS ONCE
Status: COMPLETED | OUTPATIENT
Start: 2022-10-19 | End: 2022-10-19

## 2022-10-19 RX ORDER — POTASSIUM CHLORIDE 7.45 MG/ML
10 INJECTION INTRAVENOUS ONCE
Status: DISCONTINUED | OUTPATIENT
Start: 2022-10-19 | End: 2022-10-19

## 2022-10-19 RX ORDER — POTASSIUM CHLORIDE 7.45 MG/ML
10 INJECTION INTRAVENOUS ONCE
Status: CANCELLED | OUTPATIENT
Start: 2022-10-19 | End: 2022-10-19

## 2022-10-19 RX ADMIN — POTASSIUM CHLORIDE 10 MEQ: 7.46 INJECTION, SOLUTION INTRAVENOUS at 14:25

## 2022-10-19 RX ADMIN — POTASSIUM CHLORIDE 10 MEQ: 7.46 INJECTION, SOLUTION INTRAVENOUS at 21:09

## 2022-10-19 RX ADMIN — LANSOPRAZOLE 30 MG: KIT at 06:12

## 2022-10-19 RX ADMIN — DIVALPROEX SODIUM 250 MG: 125 CAPSULE, COATED PELLETS ORAL at 11:18

## 2022-10-19 RX ADMIN — SENNOSIDES 8.6 MG: 8.6 TABLET, FILM COATED ORAL at 21:50

## 2022-10-19 RX ADMIN — POTASSIUM CHLORIDE 10 MEQ: 7.46 INJECTION, SOLUTION INTRAVENOUS at 16:49

## 2022-10-19 RX ADMIN — POTASSIUM CHLORIDE 10 MEQ: 7.46 INJECTION, SOLUTION INTRAVENOUS at 17:45

## 2022-10-19 RX ADMIN — POTASSIUM CHLORIDE 10 MEQ: 7.46 INJECTION, SOLUTION INTRAVENOUS at 19:31

## 2022-10-19 RX ADMIN — BUSPIRONE HYDROCHLORIDE 5 MG: 5 TABLET ORAL at 11:18

## 2022-10-19 RX ADMIN — POTASSIUM CHLORIDE 10 MEQ: 7.46 INJECTION, SOLUTION INTRAVENOUS at 14:15

## 2022-10-19 RX ADMIN — EZETIMIBE 10 MG: 10 TABLET ORAL at 11:17

## 2022-10-19 RX ADMIN — ENOXAPARIN SODIUM 30 MG: 100 INJECTION SUBCUTANEOUS at 10:52

## 2022-10-19 RX ADMIN — DOXEPIN HYDROCHLORIDE 10 MG: 10 CAPSULE ORAL at 21:49

## 2022-10-19 RX ADMIN — BUSPIRONE HYDROCHLORIDE 5 MG: 5 TABLET ORAL at 21:50

## 2022-10-19 RX ADMIN — DIVALPROEX SODIUM 500 MG: 125 CAPSULE, COATED PELLETS ORAL at 21:50

## 2022-10-19 RX ADMIN — MEMANTINE 5 MG: 10 TABLET ORAL at 11:17

## 2022-10-19 ASSESSMENT — PAIN SCALES - GENERAL
PAINLEVEL_OUTOF10: 0
PAINLEVEL_OUTOF10: 0

## 2022-10-19 NOTE — LETTER
[facility] Neurosurgery  [facility] [address]  Phone: [phone]  Fax: [phone]    Cristal Piedra        October 21, 2022    [address] [address]      Nursing Department: This is just a reminder that Sara Hood has a 6-week follow up appointment scheduled with Marjorie Isabel PA-C on Friday, 12/2/22 @ 9 am to follow up on the fracture in her back. Oscra Vargas would like Sara Hood to have an Xray done prior to this appointment. Please have this done between 11/29/22 and 11/30/22 so that the results can be reviewed with you at the appointment. She can get this done on a walk-in basis at the [address] [place] [address]. Their address is [address] [place] [address] in [facility]. Hours are Monday - Friday 7 am - 7 pm and Saturdays 8 am - 12 pm. The order for the Xray is already at the facility. If you have any questions or concerns, or if you need to reschedule this appointment, please don't hesitate to call our office at [phone]. We do ask that you contact our office at least 24 hours prior to your appointment time if you do need to reschedule. Otherwise, we will see Sara Hood on the day of her appointment.         Sincerely,        Jarred Guadarrama PA-C

## 2022-10-19 NOTE — PROGRESS NOTES
Neurosurgery Progress Note  10/19/2022 11:21 AM      Assessment and Plan:   T9, T11, T12, L2, L3, and L4 compression fractures-noted on CT of the thoracic and lumbar spine. Patient's T9 fracture suggested to be subacute. All other fractures are suggested to be chronic. Did palpate patient's spine once again midline and she is tender at roughly T9. Discussed with patient's granddaughter yesterday, patient does have a TLSO brace at her nursing facility. Patient should wear the brace whenever upright and out of bed. Logroll the patient while she is in bed. We will have her follow-up with me in 6 weeks with thoracolumbar x-rays. Encephalopathy in the setting of advanced dementia-Per discussion with the granddaughter patient very confused at baseline. Neurology has seen the patient, feel encephalopathy could be due to trauma. Patient did fall and was seen in the ED 10/14/2022 that required laceration repair. Supervising physician: Romaine Rose. Woody Gillespie MD.  Dr. Woody Gillespie was readily and continuously available by phone for direct consultation regarding the care of this patient. Subjective:   Admit Date: 10/16/2022  PCP: KALI Booker - CNP    Patient seen Melody Morris in bed. She is more alert and restless today compared to yesterday. She does not follow commands. Recommended to nursing staff that they have a telesitter in the room. Data:   Scheduled Meds:   potassium chloride  40 mEq Oral BID WC    potassium chloride  40 mEq Oral Once    polyethylene glycol  17 g Oral Daily    senna  1 tablet Oral Nightly    enoxaparin  30 mg SubCUTAneous Daily    busPIRone  5 mg Oral TID    doxepin  10 mg Oral Nightly    ezetimibe  10 mg Oral Daily    memantine  5 mg Oral Daily    lansoprazole  30 mg Oral QAM AC    divalproex  250 mg Oral Daily    divalproex  500 mg Oral Nightly         No intake/output data recorded. No intake/output data recorded.   No intake or output data in the 24 hours ending 10/19/22 1121  CULTURE results: Invalid input(s): BLOOD CULTURE,  URINE CULTURE, SURGICAL CULTURE  CBC:   Recent Labs     10/16/22  1510 10/17/22  0904   WBC 7.1 4.4   HGB 10.7* 10.6*    140     BMP:    Recent Labs     10/17/22  0904 10/18/22  0035 10/19/22  0732    142 137   K 3.4* 3.6 3.0*    107 102   CO2 25 25 24   BUN 16 16 11   CREATININE 0.6 0.7 0.6   GLUCOSE 96 105* 89     Hepatic:   Recent Labs     10/16/22  1510 10/17/22  0904   AST 23 28   ALT 10 13   BILITOT 0.4 0.4   ALKPHOS 82 88         IMAGING: available xray, CT, and MRI results reviewed    Objective:   Vitals: /81   Pulse (!) 116   Temp 98.4 °F (36.9 °C) (Oral)   Resp 19   Ht 5' 2\" (1.575 m)   Wt 90 lb 6.4 oz (41 kg)   SpO2 97%   BMI 16.53 kg/m²   General appearance: Alert, laying in bed, slightly restless  HEENT: Normocephalic, bruising to right cheek, EOM intact  Neurologic: Alert to self, nonverbal, tracks to voice, does not follow commands  Extremities: Bilateral upper and lower extremities able to resist gravity  Incision: none  Drains: none      Electronically signed by Whitney Rees PA-C on 10/19/2022 at 11:21 AM

## 2022-10-19 NOTE — PROGRESS NOTES
[address] [place] OF SPEECH/LANGUAGE PATHOLOGY  DAILY PROGRESS NOTE  eVga Alfaro  10/19/2022  Pt was seen this date for dysphagia treatment. IMPRESSION AND RECOMMENDATIONS:   Vega Alfaro was seen for an ongoing swallow evaluation and diet monitoring tolerance. Pt seated upright in bed, cooperative and alert. PO trials of thins via cup/straw, nectar thick liquids, puree, and minced and moist solids were given. Oral stage appears impaired characterized by absent bolus mastication/manipulation (with puree/minced and moist), intact labial seal, intact AP transit, adequate oral clearance. Pharyngeal stage appears mild-moderately impaired suspected premature pharyngeal entry with thins, pt coughed on 1/2 trials. No other s/s of aspiration were identified with other trials given. Recommendations:  Continue nectar thick liquids and puree solids. Pt is a total feed. Pills crushed in puree is recommended. Results d/w pt and RN. SLP will continue to follow. GOALS (current status in bold):  Short-term Goals  Timeframe for Short-term Goals: LOS  Goal 1: Pt will tolerate diet of puree and necar thick liquids with no s/s of aspiration 100% of the time. -meeting   New Goal: Pt will participate in instrumental evaluation to determine diet recommendations if indicated. Pt/caregiver will demonstrate understanding of diet recommendations.          EDUCATION: Results/recommendations     PAIN RATING (0-10 Scale):   Time in/Time out: SLP Individual Minutes  Time In: 3964  Time Out: 6961  Minutes: 20    Visit number: 2      Graduate Clinician Mely Moreno  10/19/2022  11:17 AM

## 2022-10-19 NOTE — PROGRESS NOTES
Dr virgie Velasco i could not get any of the oral K in her. Pt swinging hitting yelling no. Pt oral intake has been about three bites of pudding today and no thickened fluids. Pt refusing all offeres of other food for meals.  IV K started with iv covered and krunal in room each bag will take an hour each if pt does not rip out iv

## 2022-10-19 NOTE — PROGRESS NOTES
V2.0  [facility] Hospitalist Progress Note      Name:  Thiago Khanna /Age/Sex: 1940  (80 y.o. female)   MRN & CSN:  [id number] & [id number] Encounter Date/Time: 10/19/2022 8:18 AM EDT    Location:  -A PCP: Jesus Alberto Browning, [address] [facility] Day: 4    Assessment and Plan:   Thiago Khanna is a 80 y.o. female with pmh of ACOM aneurysm, dementia, expressive aphasia and ESBL UTI who presents with Encephalopathy in the setting of traumatic fall. She was noted on imaging to have multiple chronic and subacute fractures. Neurology was consulted and think the change in mentation was secondary to the traumatic fall. Neurosurgery consulted in the setting of vertebral fractures and recommend the use of LSO brace with outpatient follow up. Plan for DC today pending repletion of her potassium. Plan:  #acute metabolic encephalopathy likely post concussive following fall on 10/14/2022 in the setting of advanced dementia- improving  --Pt baseline usually oriented to person only and talkative. This AM, she is more interactive and will respond to me, and my question although her answers are not very coherent. Continue to monitor closely  --No evidence of infection. CT head from admission unremarkable for acute changes. --Neuro was consulted and felt the encephalopathy was in the setting of the trauma, Depakote was subtherapeutic, continue to monitor levels, unlikely cause of AMS. No further imaging recommended at this time  --PT/OT/SLP     #Multiple compression fractures T9-T12, L2-L4.   --Fractures appear to be subacute vs chronic including compression vs traumatic. Family was not interested in transfer to trauma center following fall.    --Unable to ascertain whether patient feels any tenderness on exam. She is not withdrawing to pain and is not exclaiming or refusing to move on exam  --Neurosurgery consulted, appreciate recs  --Repeat CT shows no changes to fractures in the thoracic and lumbar vertebrae    #Concern for dysphagia  --SLP eval appreciated - nectar thick liquids and puree solids  --Aspiration precautions     #Advance dementia with behavioral disturbance  --Continue memantine and depakote  -- Redirect/reorient/reassure frequently. --Sleep hygiene with limited daytime sleeping. --Avoid narcotics, benzodiazepines and anticholinergic medications. #GERD  -Continue PPI    #Hypokalemia  #Hypomagnesemia  --Likely 2/2 poor oral intake  --Replete as necessary and monitor    #Large stool on CT. -#Cotinue bowel regimen, add on enema as needed    #0.8 cm solid nodule. Noted on CT thoracic. --Defer management to outpatient if family is interested     #Mild dilation of pancreatic duct. --Pt has no symptoms, may need MRCP outpatient if family is interested     Diet ADULT DIET; Dysphagia - Pureed; Mildly Thick (Nectar)   DVT Prophylaxis [x] Lovenox, []  Heparin, [] SCDs, [] Ambulation,  [] Eliquis, [] Xarelto  [] Coumadin   Code Status Full Code   Disposition From: Nursing home  Expected Disposition: Nursing home  Estimated Date of Discharge: 1 day  Patient requires continued admission due to Neurosurgery eval   Surrogate Decision Maker/ POA      Subjective:     Chief Complaint: AMS from NH       Patient examined at bedside. She is moving in the bed this morning, attempting to get off. She responds to my calling her name and says good in response to the question \"how are you? \". She does attempt to respond to other question but answers are not coherent      Review of Systems:    Review of Systems   Reason unable to perform ROS: Patient has h/o dementia and reported incoherent speech. Attempts response this morning, but mostly incoherent.      Objective:   No intake or output data in the 24 hours ending 10/19/22 0756     Vitals:   Vitals:    10/19/22 0618   BP: 136/78   Pulse:    Resp:    Temp:    SpO2:        Physical Exam:   General: NAD, patient is more interactive this AM and attempts responses. She is tracking movements this AM  Eyes: EOMI  HENT: Normocephalic, old bruises and bump on her forehead, around the eyes and cheekbones  Cardiovascular: Regular rate. Respiratory: Clear to auscultation  Gastrointestinal: Soft, non tender  Genitourinary: no suprapubic tenderness, diaper on  Musculoskeletal: No edema  Skin: warm, dry  Neuro: Alert and more interactive, she appears to be oriented to self  Psych: Unable to assess. Medications:   Medications:    potassium chloride  40 mEq Oral BID WC    potassium chloride  40 mEq Oral Once    polyethylene glycol  17 g Oral Daily    senna  1 tablet Oral Nightly    enoxaparin  30 mg SubCUTAneous Daily    busPIRone  5 mg Oral TID    doxepin  10 mg Oral Nightly    ezetimibe  10 mg Oral Daily    memantine  5 mg Oral Daily    lansoprazole  30 mg Oral QAM AC    divalproex  250 mg Oral Daily    divalproex  500 mg Oral Nightly      Infusions:       PRN Meds:      Labs      No results found for this or any previous visit (from the past 24 hour(s)). Imaging/Diagnostics Last 24 Hours   CT HEAD WO CONTRAST    Result Date: 10/16/2022  EXAMINATION: CT OF THE HEAD WITHOUT CONTRAST  10/16/2022 4:09 pm TECHNIQUE: CT of the head was performed without the administration of intravenous contrast. Automated exposure control, iterative reconstruction, and/or weight based adjustment of the mA/kV was utilized to reduce the radiation dose to as low as reasonably achievable. COMPARISON: 10/14/2022 HISTORY: ORDERING SYSTEM PROVIDED HISTORY: ams TECHNOLOGIST PROVIDED HISTORY: Has a \"code stroke\" or \"stroke alert\" been called? ->No Reason for exam:->ams Decision Support Exception - unselect if not a suspected or confirmed emergency medical condition->Emergency Medical Condition (MA) Reason for Exam: AMS,ADVANCED STAGE OF DEMENTIA,FALL OUT OF BED, GREENISH OLD BRUISING ALL OVER RIGHT EYE AND FOREHEAD FINDINGS: BRAIN/VENTRICLES: The ventricles and sulci are diffusely enlarged.   Low attenuation is seen in the periventricular and subcortical white matter. No acute intracranial hemorrhage or acute infarct is identified. ORBITS: The visualized portion of the orbits demonstrate no acute abnormality. SINUSES: The visualized paranasal sinuses and mastoid air cells demonstrate no acute abnormality. SOFT TISSUES/SKULL:  No acute abnormality of the visualized skull or soft tissues. No acute intracranial abnormality.  Diffuse atrophic changes with findings suggesting chronic microvascular ischemia       Electronically signed by Jj Ovalle MD on 10/19/2022 at 7:56 AM

## 2022-10-19 NOTE — TELEPHONE ENCOUNTER
----- Message from Leland Lozoya PA-C sent at 10/19/2022 11:28 AM EDT -----  Follow-up in 6 weeks with thoracolumbar x-rays for T9 fracture.

## 2022-10-19 NOTE — PROGRESS NOTES
Comprehensive Nutrition Assessment    Type and Reason for Visit:  Initial (low BMI)    Nutrition Recommendations/Plan:   Correct depleted K+ levels  Add frozen and protein fortified pudding supplements  Assist pt with meals   Please document all po intake  Will closely monitor po intake, weight trends, poic     Malnutrition Assessment:  Malnutrition Status:  Severe malnutrition (10/19/22 1324)    Context:  Chronic Illness         Nutrition Assessment:    Pt admitted for encephalopathy, PMH: expressive aphasia, h/o dementia, malnutrition,  pt underweight, pt currently on dysphagia pureed diet with nectar thickened liquids, no po intake documented, pt receiving nursing care, visual NFPE completed, pt meets criteria for malnutrition, will follow at high nutrition risk    Nutrition Related Findings:    K+ 3.0 Wound Type: None       Current Nutrition Intake & Therapies:    Average Meal Intake: Unable to assess  Average Supplements Intake: None Ordered  ADULT DIET; Dysphagia - Pureed; Mildly Thick (Nectar)    Anthropometric Measures:  Height: 5' 2.01\" (157.5 cm)  Ideal Body Weight (IBW): 110 lbs (50 kg)       Current Body Weight: 90 lb 6.2 oz (41 kg), 82.2 % IBW.  Weight Source: Bed Scale  Current BMI (kg/m2): 16.5        Weight Adjustment For: No Adjustment                 BMI Categories: Underweight (BMI less than 22) age over 72    Estimated Daily Nutrient Needs:  Energy Requirements Based On: Kcal/kg  Weight Used for Energy Requirements: Current  Energy (kcal/day): 9386-2938 (30-35 kcal/kg)  Weight Used for Protein Requirements: Current  Protein (g/day): 57-66 (1.4-1.6 g/kg)  Method Used for Fluid Requirements: 1 ml/kcal  Fluid (ml/day): 1500    Nutrition Diagnosis:   Severe malnutrition, In context of chronic illness related to inadequate protein-energy intake as evidenced by severe muscle loss, severe loss of subcutaneous fat    Nutrition Interventions:   Food and/or Nutrient Delivery: Continue Current Diet, Start Oral Nutrition Supplement  Nutrition Education/Counseling: No recommendation at this time  Coordination of Nutrition Care: Continue to monitor while inpatient       Goals:     Goals: PO intake 50% or greater, within 2 days       Nutrition Monitoring and Evaluation:   Behavioral-Environmental Outcomes: None Identified  Food/Nutrient Intake Outcomes: Supplement Intake, Food and Nutrient Intake  Physical Signs/Symptoms Outcomes: Biochemical Data, GI Status, Meal Time Behavior, Hemodynamic Status, Fluid Status or Edema, Weight, Skin, Nutrition Focused Physical Findings    Discharge Planning:     Too soon to determine     Yesica Lock Charan [address], [address] [place] [address],   Contact: [phone]

## 2022-10-20 VITALS
RESPIRATION RATE: 18 BRPM | HEIGHT: 62 IN | WEIGHT: 90.4 LBS | TEMPERATURE: 97.6 F | HEART RATE: 89 BPM | SYSTOLIC BLOOD PRESSURE: 129 MMHG | DIASTOLIC BLOOD PRESSURE: 69 MMHG | BODY MASS INDEX: 16.63 KG/M2 | OXYGEN SATURATION: 96 %

## 2022-10-20 LAB
ANION GAP SERPL CALCULATED.3IONS-SCNC: 14 MMOL/L (ref 4–16)
BUN BLDV-MCNC: 12 MG/DL (ref 6–23)
CALCIUM SERPL-MCNC: 8.9 MG/DL (ref 8.3–10.6)
CHLORIDE BLD-SCNC: 105 MMOL/L (ref 99–110)
CO2: 20 MMOL/L (ref 21–32)
CREAT SERPL-MCNC: 0.5 MG/DL (ref 0.6–1.1)
GFR SERPL CREATININE-BSD FRML MDRD: >60 ML/MIN/1.73M2
GLUCOSE BLD-MCNC: 81 MG/DL (ref 70–99)
POTASSIUM SERPL-SCNC: 3.8 MMOL/L (ref 3.5–5.1)
SODIUM BLD-SCNC: 139 MMOL/L (ref 135–145)

## 2022-10-20 PROCEDURE — 6370000000 HC RX 637 (ALT 250 FOR IP): Performed by: STUDENT IN AN ORGANIZED HEALTH CARE EDUCATION/TRAINING PROGRAM

## 2022-10-20 PROCEDURE — 94761 N-INVAS EAR/PLS OXIMETRY MLT: CPT

## 2022-10-20 PROCEDURE — 36415 COLL VENOUS BLD VENIPUNCTURE: CPT

## 2022-10-20 PROCEDURE — 6360000002 HC RX W HCPCS: Performed by: STUDENT IN AN ORGANIZED HEALTH CARE EDUCATION/TRAINING PROGRAM

## 2022-10-20 PROCEDURE — 6370000000 HC RX 637 (ALT 250 FOR IP)

## 2022-10-20 PROCEDURE — 80048 BASIC METABOLIC PNL TOTAL CA: CPT

## 2022-10-20 RX ORDER — POLYETHYLENE GLYCOL 3350 17 G/17G
17 POWDER, FOR SOLUTION ORAL DAILY
Qty: 527 G | Refills: 1 | Status: SHIPPED | OUTPATIENT
Start: 2022-10-21 | End: 2022-11-20

## 2022-10-20 RX ORDER — SENNA PLUS 8.6 MG/1
1 TABLET ORAL NIGHTLY
Qty: 30 TABLET | Refills: 0 | Status: SHIPPED | OUTPATIENT
Start: 2022-10-20 | End: 2022-11-19

## 2022-10-20 RX ORDER — DIVALPROEX SODIUM 125 MG/1
250 CAPSULE, COATED PELLETS ORAL NIGHTLY
Qty: 120 CAPSULE | Refills: 1 | Status: SHIPPED | OUTPATIENT
Start: 2022-10-20

## 2022-10-20 RX ADMIN — ENOXAPARIN SODIUM 30 MG: 100 INJECTION SUBCUTANEOUS at 09:50

## 2022-10-20 RX ADMIN — LANSOPRAZOLE 30 MG: KIT at 09:50

## 2022-10-20 RX ADMIN — MEMANTINE 5 MG: 10 TABLET ORAL at 09:50

## 2022-10-20 RX ADMIN — BUSPIRONE HYDROCHLORIDE 5 MG: 5 TABLET ORAL at 09:50

## 2022-10-20 RX ADMIN — DIVALPROEX SODIUM 250 MG: 125 CAPSULE, COATED PELLETS ORAL at 09:50

## 2022-10-20 RX ADMIN — EZETIMIBE 10 MG: 10 TABLET ORAL at 09:50

## 2022-10-20 RX ADMIN — POLYETHYLENE GLYCOL (3350) 17 G: 17 POWDER, FOR SOLUTION ORAL at 09:51

## 2022-10-20 NOTE — PROGRESS NOTES
V2.0  [facility] [place]ist Progress Note      Name:  Juan A Chowdary /Age/Sex: 1940  (80 y.o. female)   MRN & CSN:  [id number] & [id number] Encounter Date/Time: 10/20/2022 8:18 AM EDT    Location:  -A PCP: Asya Miranda [address] PRECIOUS [address] Day: 5    Assessment and Plan:   Juan A Chowdary is a 80 y.o. female with pmh of ACOM aneurysm, dementia, expressive aphasia and ESBL UTI who presents with Encephalopathy in the setting of traumatic fall. She was noted on imaging to have multiple chronic and subacute fractures. Neurology was consulted and think the change in mentation was secondary to the traumatic fall. Neurosurgery consulted in the setting of vertebral fractures and recommend the use of LSO brace with outpatient follow up. As K was adequately repleted, blood pressure and heart rate have improved, plan for DC today. Plan:  #acute metabolic encephalopathy likely post concussive following fall on 10/14/2022 in the setting of advanced dementia- improving  --Pt interactive this morning and following commands. She accepted her medications and appears to beat baseline. Continue to monitor closely  --No evidence of infection. CT head from admission unremarkable for acute changes. --Neuro was consulted and felt the encephalopathy was in the setting of the trauma, Depakote was subtherapeutic, continue to monitor levels, unlikely cause of AMS. No further imaging recommended at this time  --PT/OT/SLP     #Multiple compression fractures T9-T12, L2-L4.   --Fractures appear to be subacute vs chronic including compression vs traumatic. Family was not interested in transfer to trauma center following fall. --Unable to ascertain whether patient feels any tenderness on exam. She is not withdrawing to pain and is not exclaiming or refusing to move on exam  --Neurosurgery consulted, appreciate recs.   Continue to wear TLSO brace whenever upright or out of bed, ensure to use to logroll approach for patient while in bed. Plan for outpatient follow-up in 6 weeks for further intervention. --Repeat CT shows no changes to fractures in the thoracic and lumbar vertebrae    #Concern for dysphagia  --SLP yadira appreciated - nectar thick liquids and puree solids  --Aspiration precautions     #Advance dementia with behavioral disturbance  --Continue memantine and depakote  -- Redirect/reorient/reassure frequently. --Sleep hygiene with limited daytime sleeping. --Avoid narcotics, benzodiazepines and anticholinergic medications. #GERD  -Continue PPI    #Hypokalemia  #Hypomagnesemia  --Likely 2/2 poor oral intake  --Replete as necessary and monitor    #Large stool on CT.   --Continue bowel regimen, add on enema as needed    #0.8 cm solid nodule. Noted on CT thoracic. --Defer management to outpatient if family is interested     #Mild dilation of pancreatic duct. --Pt has no symptoms, may need MRCP outpatient if family is interested     Diet ADULT DIET; Dysphagia - Pureed; Mildly Thick (Nectar)   DVT Prophylaxis [x] Lovenox, []  Heparin, [] SCDs, [] Ambulation,  [] Eliquis, [] Xarelto  [] Coumadin   Code Status Full Code   Disposition From: Nursing home  Expected Disposition: Nursing home  Estimated Date of Discharge: Today   Surrogate Decision Maker/ POA      Subjective:     Chief Complaint: AMS from NH       Patient examined at bedside. He is lessrestless this morning, and is following commands. She did take her medications, nurse at bedside. She is calm but is unable to really offer any responses to ROS      Review of Systems:    Review of Systems   Reason unable to perform ROS: Patient has h/o dementia and reported incoherent speech. Attempts response this morning, but mostly incoherent.      Objective:   No intake or output data in the 24 hours ending 10/20/22 0832     Vitals:   Vitals:    10/20/22 0400   BP: (!) 148/82   Pulse: 91   Resp: 25   Temp: 98.4 °F (36.9 °C)   SpO2: Physical Exam:   General: NAD, patient is more interactive this AM and attempts responses. She is less restless   Eyes: EOMI  HENT: Normocephalic, bump on her forehead, old bruises around the eyes and cheekbones  Cardiovascular: Regular rate. Respiratory: Clear to auscultation  Gastrointestinal: Soft, non tender  Genitourinary: no suprapubic tenderness, diaper on  Musculoskeletal: No edema  Skin: warm, dry  Neuro: Alert and more interactive, she appears to be oriented to self  Psych: Unable to assess. Medications:   Medications:    polyethylene glycol  17 g Oral Daily    senna  1 tablet Oral Nightly    enoxaparin  30 mg SubCUTAneous Daily    busPIRone  5 mg Oral TID    doxepin  10 mg Oral Nightly    ezetimibe  10 mg Oral Daily    memantine  5 mg Oral Daily    lansoprazole  30 mg Oral QAM AC    divalproex  250 mg Oral Daily    divalproex  500 mg Oral Nightly      Infusions:       PRN Meds:      Labs      Recent Results (from the past 24 hour(s))   Basic Metabolic Panel    Collection Time: 10/20/22  3:51 AM   Result Value Ref Range    Sodium 139 135 - 145 MMOL/L    Potassium 3.8 3.5 - 5.1 MMOL/L    Chloride 105 99 - 110 mMol/L    CO2 20 (L) 21 - 32 MMOL/L    Anion Gap 14 4 - 16    BUN 12 6 - 23 MG/DL    Creatinine 0.5 (L) 0.6 - 1.1 MG/DL    Est, Glom Filt Rate >60 >60 mL/min/1.73m2    Glucose 81 70 - 99 MG/DL    Calcium 8.9 8.3 - 10.6 MG/DL          Imaging/Diagnostics Last 24 Hours   CT HEAD WO CONTRAST    Result Date: 10/16/2022  EXAMINATION: CT OF THE HEAD WITHOUT CONTRAST  10/16/2022 4:09 pm TECHNIQUE: CT of the head was performed without the administration of intravenous contrast. Automated exposure control, iterative reconstruction, and/or weight based adjustment of the mA/kV was utilized to reduce the radiation dose to as low as reasonably achievable.  COMPARISON: 10/14/2022 HISTORY: ORDERING SYSTEM PROVIDED HISTORY: ams TECHNOLOGIST PROVIDED HISTORY: Has a \"code stroke\" or \"stroke alert\" been called? ->No Reason for exam:->ams Decision Support Exception - unselect if not a suspected or confirmed emergency medical condition->Emergency Medical Condition (MA) Reason for Exam: AMS,ADVANCED STAGE OF DEMENTIA,FALL OUT OF BED, GREENISH OLD BRUISING ALL OVER RIGHT EYE AND FOREHEAD FINDINGS: BRAIN/VENTRICLES: The ventricles and sulci are diffusely enlarged. Low attenuation is seen in the periventricular and subcortical white matter. No acute intracranial hemorrhage or acute infarct is identified. ORBITS: The visualized portion of the orbits demonstrate no acute abnormality. SINUSES: The visualized paranasal sinuses and mastoid air cells demonstrate no acute abnormality. SOFT TISSUES/SKULL:  No acute abnormality of the visualized skull or soft tissues. No acute intracranial abnormality.  Diffuse atrophic changes with findings suggesting chronic microvascular ischemia       Electronically signed by Taylor Prakash MD on 10/20/2022 at 8:32 AM

## 2022-10-20 NOTE — DISCHARGE SUMMARY
of bed, ensure to use to logroll approach for patient while in bed. Plan for outpatient follow-up in 6 weeks for further intervention. --Repeat CT shows no changes to fractures in the thoracic and lumbar vertebrae     #Concern for dysphagia  --SLP yadira appreciated - nectar thick liquids and puree solids  --Aspiration precautions      #Advance dementia with behavioral disturbance  --Continue memantine and depakote  -- Redirect/reorient/reassure frequently. --Sleep hygiene with limited daytime sleeping. --Avoid narcotics, benzodiazepines and anticholinergic medications. #GERD  -Continue PPI     #Hypokalemia  #Hypomagnesemia  --Likely 2/2 poor oral intake  --Replete as necessary and monitor     #Large stool on CT.   --Continue bowel regimen, add on enema as needed     #0.8 cm solid nodule. Noted on CT thoracic. --Defer management to outpatient if family is interested     #Mild dilation of pancreatic duct. --Pt has no symptoms, may need MRCP outpatient if family is interested    Patient is deemed medically stable for discharge    The patient expressed appropriate understanding of and agreement with the discharge recommendations, medications, and plan. Consults this admission:  IP CONSULT TO HOSPITALIST  IP CONSULT TO NEUROLOGY  IP CONSULT TO NEUROSURGERY      Discharge Instruction:   Handoff to PCP: Patient to follow up with neurosurgery in the OP setting in 6 weeks for follow up imaging of her thoracolumbar region    Follow up appointments:   Neurosurgery  Neurology    Primary care physician: Efren Kaur    Diet:  Continue nectar thick liquids and puree solids. Pt is a total feed.  Pills crushed in puree is recommended   Activity: Bedrest and fall precautions, log roll to move and wear TLSO brace whenever upright  Disposition: Discharged to:   []Home, []C, [x]SNF, []Acute Rehab, []Hospice     Condition on discharge: Stable    Discharge Medications:        Medication List        START taking these medications      polyethylene glycol 17 g packet  Commonly known as: GLYCOLAX  Take 17 g by mouth daily  Start taking on: October 21, 2022     senna 8.6 MG tablet  Commonly known as: SENOKOT  Take 1 tablet by mouth nightly            CHANGE how you take these medications      * divalproex 125 MG capsule  Commonly known as: DEPAKOTE SPRINKLE  Take 2 capsules by mouth daily  What changed:   Another medication with the same name was changed. Make sure you understand how and when to take each. Another medication with the same name was removed. Continue taking this medication, and follow the directions you see here. * divalproex 125 MG capsule  Commonly known as: DEPAKOTE SPRINKLE  Take 1 capsule by mouth Daily with supper for 7 days  What changed:   Another medication with the same name was changed. Make sure you understand how and when to take each. Another medication with the same name was removed. Continue taking this medication, and follow the directions you see here. * divalproex 125 MG capsule  Commonly known as: DEPAKOTE SPRINKLE  Take 2 capsules by mouth nightly  What changed:   how much to take  Another medication with the same name was removed. Continue taking this medication, and follow the directions you see here. * This list has 3 medication(s) that are the same as other medications prescribed for you. Read the directions carefully, and ask your doctor or other care provider to review them with you.                 CONTINUE taking these medications      aspirin 81 MG chewable tablet  Take 1 tablet by mouth daily     busPIRone 5 MG tablet  Commonly known as: BUSPAR  Take 1 tablet by mouth 3 times daily     Calcium Carbonate-Vitamin D 500-125 MG-UNIT Tabs     doxepin 10 MG capsule  Commonly known as: SINEQUAN  Take 1 capsule by mouth nightly     ezetimibe 10 MG tablet  Commonly known as: ZETIA     memantine 5 MG tablet  Commonly known as: NAMENDA  Take 1 tablet by mouth daily omeprazole 40 MG delayed release capsule  Commonly known as: PRILOSEC               Where to Get Your Medications        You can get these medications from any pharmacy    Bring a paper prescription for each of these medications  divalproex 125 MG capsule  polyethylene glycol 17 g packet  senna 8.6 MG tablet         Objective Findings at Discharge:     Vitals:    10/20/22 0951   BP: 129/69   Pulse: 89   Resp: 18   Temp: 97.6 °F (36.4 °C)   SpO2: 96%     General: NAD, patient is more interactive this AM and attempts responses. She is less restless   Eyes: EOMI  HENT: Normocephalic, bump on her forehead, old bruises around the eyes and cheekbones  Cardiovascular: Regular rate. Respiratory: Clear to auscultation  Gastrointestinal: Soft, non tender  Genitourinary: no suprapubic tenderness, diaper on  Musculoskeletal: No edema  Skin: warm, dry  Neuro: Alert and more interactive, she appears to be oriented to self  Psych: Unable to assess. BMP/CBC  Recent Labs     10/18/22  0035 10/19/22  0732 10/20/22  0351    137 139   K 3.6 3.0* 3.8    102 105   CO2 25 24 20*   BUN 16 11 12   CREATININE 0.7 0.6 0.5*       IMAGING:  EXAMINATION:   CT OF THE LUMBAR SPINE WITHOUT CONTRAST; CT OF THE THORACIC SPINE WITHOUT   CONTRAST  10/19/2022       TECHNIQUE:   CT of the lumbar spine was performed without the administration of   intravenous contrast. Multiplanar reformatted images are provided for review. Adjustment of mA and/or kV according to patient size was utilized. Automated   exposure control, iterative reconstruction, and/or weight based adjustment of   the mA/kV was utilized to reduce the radiation dose to as low as reasonably   achievable.; CT of the thoracic spine was performed without the   administration of intravenous contrast. Multiplanar reformatted images are   provided for review.  Automated exposure control, iterative reconstruction,   and/or weight based adjustment of the mA/kV was utilized to reduce the   radiation dose to as low as reasonably achievable. COMPARISON:   10/14/2022       HISTORY:   ORDERING SYSTEM PROVIDED HISTORY: multiple thoracolumbar fractures   TECHNOLOGIST PROVIDED HISTORY:   Reason for exam:->multiple thoracolumbar fractures   Reason for Exam: multiple thoracolumbar fractures   Additional signs and symptoms: UNABLE TO FOLLOW COMMANDS   Relevant Medical/Surgical History: POOR HISTORIAN       FINDINGS:   BONES/ALIGNMENT: There is normal alignment of the spine. Similar complete   burst fractures T9 and T12. Probable Schmorl's node of the superior endplate   of O29. Similar mild incomplete burst fractures of L2 through L4. No   osseous destructive lesion is seen. Diffuse osteopenia. DEGENERATIVE CHANGES: Mild-to-moderate degenerative changes of the lumbar   spine. SOFT TISSUES/RETROPERITONEUM: Solid and subsolid pulmonary nodules again   noted. Recommend follow-up according to prior chest CT. Partially imaged   coronary artery calcification. Small hiatal hernia. Luis Armando Saunders EXAMINATION:   CT OF THE LUMBAR SPINE WITHOUT CONTRAST; CT OF THE THORACIC SPINE WITHOUT   CONTRAST  10/19/2022       TECHNIQUE:   CT of the lumbar spine was performed without the administration of   intravenous contrast. Multiplanar reformatted images are provided for review. Adjustment of mA and/or kV according to patient size was utilized. Automated   exposure control, iterative reconstruction, and/or weight based adjustment of   the mA/kV was utilized to reduce the radiation dose to as low as reasonably   achievable.; CT of the thoracic spine was performed without the   administration of intravenous contrast. Multiplanar reformatted images are   provided for review. Automated exposure control, iterative reconstruction,   and/or weight based adjustment of the mA/kV was utilized to reduce the   radiation dose to as low as reasonably achievable.        COMPARISON:   10/14/2022       HISTORY: ORDERING SYSTEM PROVIDED HISTORY: multiple thoracolumbar fractures   TECHNOLOGIST PROVIDED HISTORY:   Reason for exam:->multiple thoracolumbar fractures   Reason for Exam: multiple thoracolumbar fractures   Additional signs and symptoms: UNABLE TO FOLLOW COMMANDS   Relevant Medical/Surgical History: POOR HISTORIAN       FINDINGS:   BONES/ALIGNMENT: There is normal alignment of the spine. Similar complete   burst fractures T9 and T12. Probable Schmorl's node of the superior endplate   of T56. Similar mild incomplete burst fractures of L2 through L4. No   osseous destructive lesion is seen. Diffuse osteopenia. DEGENERATIVE CHANGES: Mild-to-moderate degenerative changes of the lumbar   spine. SOFT TISSUES/RETROPERITONEUM: Solid and subsolid pulmonary nodules again   noted. Recommend follow-up according to prior chest CT. Partially imaged   coronary artery calcification. Small hiatal hernia. .           Additional Information: Patient seen and examined day of discharge.  For more information regarding patient's care please contact Nicholas Mccain [facility] records [phone]    Discharge Time of 35 minutes    Electronically signed by Krystal Larson MD on 10/20/2022 at 11:44 AM

## 2022-10-20 NOTE — CARE COORDINATION
Pt to be discharged today back to [facility]. Facility aware of pt's return. [facility] was called for transportation. [facility] will  pt at 14:00. Dr Marcela Javed called the guardian. Please call report to [phone]. Please complete and sign JANA and place in packet along with any medication scripts. Pt to be log rolled in bed and when upright she uses a TLSO brace which is at the facility. RONIT spoke with Nicol at [facility] [id number] to inform her of the  time. RONIT mentioned pt's brace. Nicol informed CM that pt does not have a brace there and did not need one. She stated that the pt does not walk much. Dr Marcela Javed made aware. RONIT called [place] [facility] [phone] to inform them that the pt's discharge is being held up due to pt not having the brace. RONIT spoke with Bharat who stated that she had cleaned the pt's room and there is no brace in the room. Explained that it is still possible that pt will be discharged today. 15:53 RONIT spoke with Ann Cruz from [facility]. She discussed the brace with the DON who stated that pt never had a brace while at [address] [place] [address]. There was no documentation supporting a brace.

## 2022-10-20 NOTE — PROGRESS NOTES
Pt report given to receiving facility. Facility nurse wanted physician to call, relayed message to MD. Oh [place] all questions. LDA removed. Gave report to transport and educated transport pt would need to be log rolled on to cot.

## 2022-10-20 NOTE — DISCHARGE INSTR - COC
Continuity of Care Form    Patient Name: Crescencio Mejia   :  1940  MRN:  [id number]    Admit date:  10/16/2022  Discharge date:  10/20/2022    Code Status Order: Full Code   Advance Directives:     Admitting Physician:  Destiny Enciso MD  PCP: KALI London - CNP    Discharging Nurse: [facility] Unit/Room#: -A  Discharging Unit Phone Number: [phone]    Emergency Contact:   Extended Emergency Contact Information  Primary Emergency Contact: real hurst  Home Phone: [phone]  Relation: Legal Guardian    Past Surgical History:  No past surgical history on file. 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    Encephalopathy G93.40       Isolation/Infection:   Isolation            Contact          Patient Infection Status       Infection Onset Added Last Indicated Last Indicated By Review Planned Expiration Resolved Resolved By    ESBL (Extended Spectrum Beta Lactamase) 21 Culture, Urine                Nurse Assessment:  Last Vital Signs: /69   Pulse 89   Temp 97.6 °F (36.4 °C) (Axillary)   Resp 18   Ht 5' 2.01\" (1.575 m)   Wt 90 lb 6.4 oz (41 kg)   SpO2 96%   BMI 16.53 kg/m²     Last documented pain score (0-10 scale): Pain Level: 0  Last Weight:   Wt Readings from Last 1 Encounters:   10/18/22 90 lb 6.4 oz (41 kg)     Mental Status:  disoriented    IV Access:  - None    Nursing Mobility/ADLs:  Walking   Dependent  Transfer  Dependent  Bathing  Dependent  Dressing Dependent  Toileting  Dependent  Feeding  Dependent  Med Admin  Dependent  Med Delivery   crushed    Wound Care Documentation and Therapy:  Wound 09/16/22 Ankle Anterior;Left;Outer Scabbed (Active)   Number of days: 34       Wound 09/16/22 Arm Anterior;Left; Inner; Outer; Upper; Lower multiple bruising throughout LEFT ARM due to patient fall at facility (Active)   Number of days: 33       Wound 09/16/22 Arm Anterior;Right; Lower; Upper; Outer; Inner multiple bruising throughout LEFT ARM due to patient fall at facility (Active)   Number of days: 33       Wound 09/16/22 Leg Anterior;Distal;Left;Right;Upper; Lower multiple bruising throughout BL LEG due to patient fall at facility (Active)   Number of days: 33        Elimination:  Continence: Bowel: No  Bladder: No  Urinary Catheter: None   Colostomy/Ileostomy/Ileal Conduit: No       Date of Last BM: 10/19/2022    Intake/Output Summary (Last 24 hours) at 10/20/2022 1257  Last data filed at 10/20/2022 1004  Gross per 24 hour   Intake 137 ml   Output --   Net 137 ml     No intake/output data recorded. Safety Concerns: At Risk for Falls    Impairments/Disabilities:      None    Patient's personal belongings (please select all that are sent with patient):  None    RN SIGNATURE:  Electronically signed by Alberto Skinner RN on 10/20/22 at 12:53 PM EDT    CASE MANAGEMENT/SOCIAL WORK SECTION    Inpatient Status Date: 10/18/2022    Readmission Risk Assessment Score:  Readmission Risk              Risk of Unplanned Readmission:  16           Discharging to Facility/ Agency   Name:   Address:  Phone:  Fax:    Dialysis Facility (if applicable)   Name:  Address:  Dialysis Schedule:  Phone:  Fax:    / signature: Electronically signed by Tess Hernandez RN on 10/20/22 at 12:32 PM EDT    PHYSICIAN SECTION    Prognosis: Fair    Condition at Discharge: Stable    Rehab Potential (if transferring to Rehab):  Fair    Nutrition Therapy:  Current Nutrition Therapy: - Oral Diet:  Dysphagia 1 pureed    Routes of Feeding: Oral  Liquids: Honey Thick Liquids  Daily Fluid Restriction: no  Last Modified Barium Swallow with Video (Video Swallowing Test): not done    Treatments at the Time of Hospital Discharge:   Respiratory Treatments: ***  Oxygen Therapy:  is not on home oxygen therapy. Ventilator:    - No ventilator support    Rehab Therapies: Physical Therapy, Occupational Therapy, Orthotics/Prosthetics, and Speech/Language Therapy  Weight Bearing Status/Restrictions: No weight bearing restrictions  Other Medical Equipment (for information only, NOT a DME order):  walker and braces ***  Other Treatments: TLSO brace      Recommended Labs or Other Treatments After Discharge: TLSO brace at her nursing facility. Patient should wear the brace whenever upright and out of bed. Logroll the patient while she is in bed. We will have her follow-up with me in 6 weeks with thoracolumbar x-rays. SLP Recommendations:  Continue nectar thick liquids and puree solids. Pt is a total feed. Pills crushed in puree is recommended    Physician Certification: I certify the above information and transfer of Comfort Rodríguez  is necessary for the continuing treatment of the diagnosis listed and that she requires [facility] for greater 30 days.      Update Admission H&P: No change in H&P    PHYSICIAN SIGNATURE:  Electronically signed by Su Wasserman MD on 10/20/22 at 11:51 AM EDT

## 2022-10-21 LAB
CULTURE: NORMAL
CULTURE: NORMAL
Lab: NORMAL
Lab: NORMAL
SPECIMEN: NORMAL
SPECIMEN: NORMAL

## 2022-10-21 NOTE — TELEPHONE ENCOUNTER
Spoke to patient's nurse Beena @ [facility] at [facility]. Appointment scheduled on Friday, 12/2/22 @ 9 am with Corin Cote PA-C. Location of appointment given to nurse - she was also aware that XR Thoracolumbar Spine needs done prior to appointment (11/29/22 - 11/30/22). Nurse agreeable and verbalized understanding. Nothing further needed at this time. Spine care instructions and appointment reminder letter sent to nursing facility via mail.

## 2022-10-26 ENCOUNTER — HOSPITAL ENCOUNTER (INPATIENT)
Age: 82
LOS: 3 days | Discharge: NURSING, REHAB OR LONG-TERM CARE | DRG: 871 | End: 2022-10-29
Attending: EMERGENCY MEDICINE | Admitting: STUDENT IN AN ORGANIZED HEALTH CARE EDUCATION/TRAINING PROGRAM
Payer: COMMERCIAL

## 2022-10-26 ENCOUNTER — APPOINTMENT (OUTPATIENT)
Dept: CT IMAGING | Age: 82
DRG: 871 | End: 2022-10-26
Payer: COMMERCIAL

## 2022-10-26 ENCOUNTER — APPOINTMENT (OUTPATIENT)
Dept: GENERAL RADIOLOGY | Age: 82
DRG: 871 | End: 2022-10-26
Payer: COMMERCIAL

## 2022-10-26 DIAGNOSIS — I95.9 TRANSIENT HYPOTENSION: ICD-10-CM

## 2022-10-26 DIAGNOSIS — R82.71 BACTERIURIA: ICD-10-CM

## 2022-10-26 DIAGNOSIS — G93.40 ENCEPHALOPATHY: Primary | ICD-10-CM

## 2022-10-26 DIAGNOSIS — J18.9 PNEUMONIA OF RIGHT LOWER LOBE DUE TO INFECTIOUS ORGANISM: ICD-10-CM

## 2022-10-26 PROBLEM — G92.8 TOXIC METABOLIC ENCEPHALOPATHY: Status: ACTIVE | Noted: 2022-10-26

## 2022-10-26 LAB
ADENOVIRUS DETECTION BY PCR: NOT DETECTED
ALBUMIN SERPL-MCNC: 3.4 GM/DL (ref 3.4–5)
ALP BLD-CCNC: 69 IU/L (ref 40–129)
ALT SERPL-CCNC: 13 U/L (ref 10–40)
ANION GAP SERPL CALCULATED.3IONS-SCNC: 15 MMOL/L (ref 4–16)
AST SERPL-CCNC: 27 IU/L (ref 15–37)
BACTERIA: ABNORMAL /HPF
BANDED NEUTROPHILS ABSOLUTE COUNT: 3.15 K/CU MM
BANDED NEUTROPHILS RELATIVE PERCENT: 37 % (ref 5–11)
BASE EXCESS MIXED: 1.6 (ref 0–2.3)
BASE EXCESS MIXED: 3.1 (ref 0–2.3)
BILIRUB SERPL-MCNC: 0.4 MG/DL (ref 0–1)
BILIRUBIN DIRECT: 0.2 MG/DL (ref 0–0.3)
BILIRUBIN URINE: NEGATIVE MG/DL
BILIRUBIN, INDIRECT: 0.2 MG/DL (ref 0–0.7)
BLOOD, URINE: NEGATIVE
BORDETELLA PARAPERTUSSIS BY PCR: NOT DETECTED
BORDETELLA PERTUSSIS PCR: NOT DETECTED
BUN BLDV-MCNC: 39 MG/DL (ref 6–23)
CALCIUM SERPL-MCNC: 9.6 MG/DL (ref 8.3–10.6)
CHLAMYDOPHILA PNEUMONIA PCR: NOT DETECTED
CHLORIDE BLD-SCNC: 109 MMOL/L (ref 99–110)
CLARITY: CLEAR
CO2: 24 MMOL/L (ref 21–32)
COLOR: YELLOW
CORONAVIRUS 229E PCR: NOT DETECTED
CORONAVIRUS HKU1 PCR: NOT DETECTED
CORONAVIRUS NL63 PCR: NOT DETECTED
CORONAVIRUS OC43 PCR: NOT DETECTED
CREAT SERPL-MCNC: 1.2 MG/DL (ref 0.6–1.1)
DIFFERENTIAL TYPE: ABNORMAL
DOHLE BODIES: PRESENT
GFR SERPL CREATININE-BSD FRML MDRD: 46 ML/MIN/1.73M2
GLUCOSE BLD-MCNC: 135 MG/DL (ref 70–99)
GLUCOSE, URINE: NEGATIVE MG/DL
HCO3 VENOUS: 27 MMOL/L (ref 19–25)
HCO3 VENOUS: 29.2 MMOL/L (ref 19–25)
HCT VFR BLD CALC: 39.3 % (ref 37–47)
HEMOGLOBIN: 11.8 GM/DL (ref 12.5–16)
HIGH SENSITIVE C-REACTIVE PROTEIN: 152.5 MG/L (ref 0–5)
HUMAN METAPNEUMOVIRUS PCR: NOT DETECTED
INFLUENZA A BY PCR: NOT DETECTED
INFLUENZA A H1 (2009) PCR: NOT DETECTED
INFLUENZA A H1 PANDEMIC PCR: NOT DETECTED
INFLUENZA A H3 PCR: NOT DETECTED
INFLUENZA B BY PCR: NOT DETECTED
KETONES, URINE: NEGATIVE MG/DL
LACTIC ACID, SEPSIS: 1.5 MMOL/L (ref 0.5–1.9)
LACTIC ACID, SEPSIS: 1.9 MMOL/L (ref 0.5–1.9)
LEUKOCYTE ESTERASE, URINE: ABNORMAL
LIPASE: 17 IU/L (ref 13–60)
LYMPHOCYTES ABSOLUTE: 0.5 K/CU MM
LYMPHOCYTES RELATIVE PERCENT: 6 % (ref 24–44)
MCH RBC QN AUTO: 27.8 PG (ref 27–31)
MCHC RBC AUTO-ENTMCNC: 30 % (ref 32–36)
MCV RBC AUTO: 92.7 FL (ref 78–100)
METAMYELOCYTES ABSOLUTE COUNT: 0.17 K/CU MM
METAMYELOCYTES PERCENT: 2 %
MONOCYTES ABSOLUTE: 0.7 K/CU MM
MONOCYTES RELATIVE PERCENT: 8 % (ref 0–4)
MUCUS: ABNORMAL HPF
MYCOPLASMA PNEUMONIAE PCR: NOT DETECTED
NITRITE URINE, QUANTITATIVE: NEGATIVE
O2 SAT, VEN: 40.2 % (ref 50–70)
O2 SAT, VEN: 84.7 % (ref 50–70)
PARAINFLUENZA 1 PCR: NOT DETECTED
PARAINFLUENZA 2 PCR: NOT DETECTED
PARAINFLUENZA 3 PCR: NOT DETECTED
PARAINFLUENZA 4 PCR: NOT DETECTED
PCO2, VEN: 38 MMHG (ref 38–52)
PCO2, VEN: 58 MMHG (ref 38–52)
PDW BLD-RTO: 15.9 % (ref 11.7–14.9)
PH VENOUS: 7.31 (ref 7.32–7.42)
PH VENOUS: 7.46 (ref 7.32–7.42)
PH, URINE: 6.5 (ref 5–8)
PLATELET # BLD: 241 K/CU MM (ref 140–440)
PMV BLD AUTO: 9.7 FL (ref 7.5–11.1)
PO2, VEN: 29 MMHG (ref 28–48)
PO2, VEN: 48 MMHG (ref 28–48)
POTASSIUM SERPL-SCNC: 4.2 MMOL/L (ref 3.5–5.1)
PROCALCITONIN: 1.93
PROCALCITONIN: 1.97
PROTEIN UA: 100 MG/DL
RBC # BLD: 4.24 M/CU MM (ref 4.2–5.4)
RBC URINE: ABNORMAL /HPF (ref 0–6)
RHINOVIRUS ENTEROVIRUS PCR: NOT DETECTED
RSV PCR: NOT DETECTED
SARS-COV-2: NOT DETECTED
SEGMENTED NEUTROPHILS ABSOLUTE COUNT: 4 K/CU MM
SEGMENTED NEUTROPHILS RELATIVE PERCENT: 47 % (ref 36–66)
SODIUM BLD-SCNC: 148 MMOL/L (ref 135–145)
SPECIFIC GRAVITY UA: 1.02 (ref 1–1.03)
TOTAL PROTEIN: 7.7 GM/DL (ref 6.4–8.2)
TRICHOMONAS: ABNORMAL /HPF
TROPONIN T: 0.04 NG/ML
TROPONIN T: 0.04 NG/ML
TROPONIN T: 0.06 NG/ML
UROBILINOGEN, URINE: 0.2 MG/DL (ref 0.2–1)
WBC # BLD: 8.5 K/CU MM (ref 4–10.5)
WBC CLUMP: ABNORMAL /HPF
WBC UA: 243 /HPF (ref 0–5)

## 2022-10-26 PROCEDURE — 87086 URINE CULTURE/COLONY COUNT: CPT

## 2022-10-26 PROCEDURE — 87181 SC STD AGAR DILUTION PER AGT: CPT

## 2022-10-26 PROCEDURE — 86141 C-REACTIVE PROTEIN HS: CPT

## 2022-10-26 PROCEDURE — 71045 X-RAY EXAM CHEST 1 VIEW: CPT

## 2022-10-26 PROCEDURE — 84145 PROCALCITONIN (PCT): CPT

## 2022-10-26 PROCEDURE — 36415 COLL VENOUS BLD VENIPUNCTURE: CPT

## 2022-10-26 PROCEDURE — 80053 COMPREHEN METABOLIC PANEL: CPT

## 2022-10-26 PROCEDURE — C9113 INJ PANTOPRAZOLE SODIUM, VIA: HCPCS | Performed by: STUDENT IN AN ORGANIZED HEALTH CARE EDUCATION/TRAINING PROGRAM

## 2022-10-26 PROCEDURE — 96365 THER/PROPH/DIAG IV INF INIT: CPT

## 2022-10-26 PROCEDURE — 2580000003 HC RX 258: Performed by: EMERGENCY MEDICINE

## 2022-10-26 PROCEDURE — 83690 ASSAY OF LIPASE: CPT

## 2022-10-26 PROCEDURE — 51701 INSERT BLADDER CATHETER: CPT

## 2022-10-26 PROCEDURE — 99285 EMERGENCY DEPT VISIT HI MDM: CPT

## 2022-10-26 PROCEDURE — 94761 N-INVAS EAR/PLS OXIMETRY MLT: CPT

## 2022-10-26 PROCEDURE — 87186 SC STD MICRODIL/AGAR DIL: CPT

## 2022-10-26 PROCEDURE — 2700000000 HC OXYGEN THERAPY PER DAY

## 2022-10-26 PROCEDURE — 83605 ASSAY OF LACTIC ACID: CPT

## 2022-10-26 PROCEDURE — 96367 TX/PROPH/DG ADDL SEQ IV INF: CPT

## 2022-10-26 PROCEDURE — A4216 STERILE WATER/SALINE, 10 ML: HCPCS | Performed by: STUDENT IN AN ORGANIZED HEALTH CARE EDUCATION/TRAINING PROGRAM

## 2022-10-26 PROCEDURE — 81001 URINALYSIS AUTO W/SCOPE: CPT

## 2022-10-26 PROCEDURE — 93005 ELECTROCARDIOGRAM TRACING: CPT | Performed by: EMERGENCY MEDICINE

## 2022-10-26 PROCEDURE — 70450 CT HEAD/BRAIN W/O DYE: CPT

## 2022-10-26 PROCEDURE — 82805 BLOOD GASES W/O2 SATURATION: CPT

## 2022-10-26 PROCEDURE — 6360000002 HC RX W HCPCS: Performed by: STUDENT IN AN ORGANIZED HEALTH CARE EDUCATION/TRAINING PROGRAM

## 2022-10-26 PROCEDURE — 87077 CULTURE AEROBIC IDENTIFY: CPT

## 2022-10-26 PROCEDURE — 87040 BLOOD CULTURE FOR BACTERIA: CPT

## 2022-10-26 PROCEDURE — 2580000003 HC RX 258: Performed by: STUDENT IN AN ORGANIZED HEALTH CARE EDUCATION/TRAINING PROGRAM

## 2022-10-26 PROCEDURE — 85027 COMPLETE CBC AUTOMATED: CPT

## 2022-10-26 PROCEDURE — 82248 BILIRUBIN DIRECT: CPT

## 2022-10-26 PROCEDURE — 6370000000 HC RX 637 (ALT 250 FOR IP): Performed by: EMERGENCY MEDICINE

## 2022-10-26 PROCEDURE — 2500000003 HC RX 250 WO HCPCS: Performed by: EMERGENCY MEDICINE

## 2022-10-26 PROCEDURE — 84484 ASSAY OF TROPONIN QUANT: CPT

## 2022-10-26 PROCEDURE — 85007 BL SMEAR W/DIFF WBC COUNT: CPT

## 2022-10-26 PROCEDURE — 6360000002 HC RX W HCPCS: Performed by: EMERGENCY MEDICINE

## 2022-10-26 PROCEDURE — 2060000000 HC ICU INTERMEDIATE R&B

## 2022-10-26 PROCEDURE — 0202U NFCT DS 22 TRGT SARS-COV-2: CPT

## 2022-10-26 RX ORDER — EZETIMIBE 10 MG/1
10 TABLET ORAL DAILY
Status: CANCELLED | OUTPATIENT
Start: 2022-10-26

## 2022-10-26 RX ORDER — ASPIRIN 81 MG/1
81 TABLET, CHEWABLE ORAL DAILY
Status: CANCELLED | OUTPATIENT
Start: 2022-10-26

## 2022-10-26 RX ORDER — SODIUM CHLORIDE 0.9 % (FLUSH) 0.9 %
5-40 SYRINGE (ML) INJECTION EVERY 12 HOURS SCHEDULED
Status: DISCONTINUED | OUTPATIENT
Start: 2022-10-26 | End: 2022-10-29 | Stop reason: HOSPADM

## 2022-10-26 RX ORDER — BUSPIRONE HYDROCHLORIDE 5 MG/1
5 TABLET ORAL 3 TIMES DAILY
Status: CANCELLED | OUTPATIENT
Start: 2022-10-26

## 2022-10-26 RX ORDER — ACETAMINOPHEN 650 MG/1
650 SUPPOSITORY RECTAL ONCE
Status: COMPLETED | OUTPATIENT
Start: 2022-10-26 | End: 2022-10-26

## 2022-10-26 RX ORDER — SENNA PLUS 8.6 MG/1
1 TABLET ORAL NIGHTLY
Status: CANCELLED | OUTPATIENT
Start: 2022-10-26

## 2022-10-26 RX ORDER — ACETAMINOPHEN 500 MG
500 TABLET ORAL EVERY 6 HOURS PRN
Status: DISCONTINUED | OUTPATIENT
Start: 2022-10-26 | End: 2022-10-29 | Stop reason: HOSPADM

## 2022-10-26 RX ORDER — MEMANTINE HYDROCHLORIDE 5 MG/1
5 TABLET ORAL DAILY
Status: CANCELLED | OUTPATIENT
Start: 2022-10-26

## 2022-10-26 RX ORDER — SODIUM CHLORIDE, SODIUM LACTATE, POTASSIUM CHLORIDE, CALCIUM CHLORIDE 600; 310; 30; 20 MG/100ML; MG/100ML; MG/100ML; MG/100ML
INJECTION, SOLUTION INTRAVENOUS CONTINUOUS
Status: DISCONTINUED | OUTPATIENT
Start: 2022-10-26 | End: 2022-10-27

## 2022-10-26 RX ORDER — PANTOPRAZOLE SODIUM 40 MG/10ML
40 INJECTION, POWDER, LYOPHILIZED, FOR SOLUTION INTRAVENOUS DAILY
Status: DISCONTINUED | OUTPATIENT
Start: 2022-10-26 | End: 2022-10-26 | Stop reason: CLARIF

## 2022-10-26 RX ORDER — DOXEPIN HYDROCHLORIDE 10 MG/1
10 CAPSULE ORAL NIGHTLY
Status: CANCELLED | OUTPATIENT
Start: 2022-10-26

## 2022-10-26 RX ORDER — SODIUM CHLORIDE 0.9 % (FLUSH) 0.9 %
5-40 SYRINGE (ML) INJECTION PRN
Status: DISCONTINUED | OUTPATIENT
Start: 2022-10-26 | End: 2022-10-29 | Stop reason: HOSPADM

## 2022-10-26 RX ORDER — DIVALPROEX SODIUM 125 MG/1
250 CAPSULE, COATED PELLETS ORAL NIGHTLY
Status: CANCELLED | OUTPATIENT
Start: 2022-10-26

## 2022-10-26 RX ORDER — DIVALPROEX SODIUM 125 MG/1
250 CAPSULE, COATED PELLETS ORAL DAILY
Status: CANCELLED | OUTPATIENT
Start: 2022-10-26

## 2022-10-26 RX ORDER — PANTOPRAZOLE SODIUM 40 MG/10ML
40 INJECTION, POWDER, LYOPHILIZED, FOR SOLUTION INTRAVENOUS DAILY
Status: CANCELLED | OUTPATIENT
Start: 2022-10-26

## 2022-10-26 RX ORDER — SODIUM CHLORIDE 9 MG/ML
INJECTION, SOLUTION INTRAVENOUS PRN
Status: DISCONTINUED | OUTPATIENT
Start: 2022-10-26 | End: 2022-10-29 | Stop reason: HOSPADM

## 2022-10-26 RX ORDER — HEPARIN SODIUM 5000 [USP'U]/ML
5000 INJECTION, SOLUTION INTRAVENOUS; SUBCUTANEOUS 2 TIMES DAILY
Status: DISCONTINUED | OUTPATIENT
Start: 2022-10-26 | End: 2022-10-29 | Stop reason: HOSPADM

## 2022-10-26 RX ORDER — 0.9 % SODIUM CHLORIDE 0.9 %
1000 INTRAVENOUS SOLUTION INTRAVENOUS ONCE
Status: COMPLETED | OUTPATIENT
Start: 2022-10-26 | End: 2022-10-26

## 2022-10-26 RX ORDER — POLYETHYLENE GLYCOL 3350 17 G/17G
17 POWDER, FOR SOLUTION ORAL DAILY
Status: CANCELLED | OUTPATIENT
Start: 2022-10-26

## 2022-10-26 RX ADMIN — ACETAMINOPHEN 650 MG: 650 SUPPOSITORY RECTAL at 01:07

## 2022-10-26 RX ADMIN — DOXYCYCLINE 100 MG: 100 INJECTION, POWDER, LYOPHILIZED, FOR SOLUTION INTRAVENOUS at 04:33

## 2022-10-26 RX ADMIN — SODIUM CHLORIDE 1000 ML: 9 INJECTION, SOLUTION INTRAVENOUS at 01:43

## 2022-10-26 RX ADMIN — HEPARIN SODIUM 5000 UNITS: 5000 INJECTION INTRAVENOUS; SUBCUTANEOUS at 09:48

## 2022-10-26 RX ADMIN — SODIUM CHLORIDE, POTASSIUM CHLORIDE, SODIUM LACTATE AND CALCIUM CHLORIDE 500 ML: 600; 310; 30; 20 INJECTION, SOLUTION INTRAVENOUS at 04:29

## 2022-10-26 RX ADMIN — SODIUM CHLORIDE, PRESERVATIVE FREE 40 MG: 5 INJECTION INTRAVENOUS at 09:47

## 2022-10-26 RX ADMIN — MEROPENEM 1000 MG: 1 INJECTION, POWDER, FOR SOLUTION INTRAVENOUS at 01:29

## 2022-10-26 RX ADMIN — SODIUM CHLORIDE, POTASSIUM CHLORIDE, SODIUM LACTATE AND CALCIUM CHLORIDE: 600; 310; 30; 20 INJECTION, SOLUTION INTRAVENOUS at 21:50

## 2022-10-26 RX ADMIN — SODIUM CHLORIDE, POTASSIUM CHLORIDE, SODIUM LACTATE AND CALCIUM CHLORIDE: 600; 310; 30; 20 INJECTION, SOLUTION INTRAVENOUS at 11:55

## 2022-10-26 RX ADMIN — SODIUM CHLORIDE, PRESERVATIVE FREE 10 ML: 5 INJECTION INTRAVENOUS at 09:49

## 2022-10-26 RX ADMIN — HEPARIN SODIUM 5000 UNITS: 5000 INJECTION INTRAVENOUS; SUBCUTANEOUS at 21:01

## 2022-10-26 RX ADMIN — MEROPENEM 500 MG: 500 INJECTION, POWDER, FOR SOLUTION INTRAVENOUS at 14:29

## 2022-10-26 RX ADMIN — VANCOMYCIN HYDROCHLORIDE 1000 MG: 1 INJECTION, POWDER, LYOPHILIZED, FOR SOLUTION INTRAVENOUS at 05:38

## 2022-10-26 ASSESSMENT — PAIN SCALES - WONG BAKER: WONGBAKER_NUMERICALRESPONSE: 0

## 2022-10-26 NOTE — ED PROVIDER NOTES
CHIEF COMPLAINT    No chief complaint on file. HPI  Karlene Everett is a 80 y.o. female with history of advanced vascular dementia, anterior communicating artery aneurysm, frequent falls, multiple spinal compression fractures, ESBL UTI's from falls who presents to the ED via EMS from her living facility with reports of fever throughout the day. EMS was told by nursing facility that patient had fevers of 104 all day although when they checked her temperature she was found to have temp of 99.0 °F.  She was transferred here for further evaluation. On my assessment the patient is unable to provide any history. It seems that per previous documentation from her last hospitalization from 10/16-10/20 she is generally conversational although confused. Position was secondary to acute on chronic encephalopathy with frequent falls and he was found to have multiple compression fractures that time as well as some dysphagia. No other history is available. REVIEW OF SYSTEMS  Unable to obtain secondary to patient mental status  ? ? PAST MEDICAL HISTORY  Past Medical History:   Diagnosis Date    Anterior communicating artery aneurysm 06/09/2020    ESBL (extended spectrum beta-lactamase) producing bacteria infection 08/19/2021    urine; 8/23/21    Expressive aphasia 06/09/2020     FAMILY HISTORY  History reviewed. No pertinent family history. SOCIAL HISTORY  Social History     Socioeconomic History    Marital status: Single     Spouse name: None    Number of children: None    Years of education: None    Highest education level: None   Tobacco Use    Smoking status: Former     Types: Cigarettes    Smokeless tobacco: Never   Substance and Sexual Activity    Alcohol use: Not Currently    Drug use: Never    Sexual activity: Not Currently       SURGICAL HISTORY  History reviewed. No pertinent surgical history.   CURRENT MEDICATIONS  Current Discharge Medication List        CONTINUE these medications which have NOT CHANGED    Details   polyethylene glycol (GLYCOLAX) 17 g packet Take 17 g by mouth daily  Qty: 527 g, Refills: 1      senna (SENOKOT) 8.6 MG tablet Take 1 tablet by mouth nightly  Qty: 30 tablet, Refills: 0      !! divalproex (DEPAKOTE SPRINKLE) 125 MG capsule Take 2 capsules by mouth nightly  Qty: 120 capsule, Refills: 1      busPIRone (BUSPAR) 5 MG tablet Take 1 tablet by mouth 3 times daily  Qty: 90 tablet, Refills: 1      !! divalproex (DEPAKOTE SPRINKLE) 125 MG capsule Take 2 capsules by mouth daily  Qty: 60 capsule, Refills: 1      doxepin (SINEQUAN) 10 MG capsule Take 1 capsule by mouth nightly  Qty: 30 capsule, Refills: 1      memantine (NAMENDA) 5 MG tablet Take 1 tablet by mouth daily  Qty: 30 tablet, Refills: 1      aspirin 81 MG chewable tablet Take 1 tablet by mouth daily  Qty: 30 tablet, Refills: 3      Calcium Carbonate-Vitamin D 500-125 MG-UNIT TABS Take 1 tablet by mouth daily      ezetimibe (ZETIA) 10 MG tablet Take 10 mg by mouth daily      omeprazole (PRILOSEC) 40 MG delayed release capsule Take 40 mg by mouth daily       ! ! - Potential duplicate medications found. Please discuss with provider. ALLERGIES  Allergies   Allergen Reactions    Ativan [Lorazepam] Other (See Comments)     Psychotic Reaction/Combative    Vicodin [Hydrocodone-Acetaminophen] Other (See Comments)     Psychotic reaction    Valium [Diazepam] Other (See Comments)     Ineffective       Nursing notes reviewed by myself for past medical history, family history, social history, surgical history, current medications, and allergies. PHYSICAL EXAM  VITAL SIGNS: Triage VS:    ED Triage Vitals   Enc Vitals Group      BP       Pulse       Resp       Temp       Temp src       SpO2       Weight       Height       Head Circumference       Peak Flow       Pain Score       Pain Loc       Pain Edu? Excl. in [address]?       Constitutional: Well developed, malnourished, chronically ill-appearing  HENT: Multiple areas of old bruising present to right frontal temporal region as well as right side of patient's face, bilateral external ears normal, tympanic membranes clear bilaterally, dry mucous membranes, no oral exudates, Nose normal.   Eyes: PERRL, EOMI, Conjunctiva normal, No discharge. No scleral icterus. Neck: Normal range of motion, No tenderness, Supple. Lymphatic: No lymphadenopathy noted. Cardiovascular: Normal heart rate, Normal rhythm, No murmurs, gallops or rubs. Thorax & Lungs: Diminished breath sounds bilaterally without definitive wheezing, rhonchi, or rales  Abdomen: Soft, No tenderness, No masses, No pulsatile masses, No distention, Normal bowel sounds  Skin: Warm, Dry, stage I sacral decubitus ulcer without surrounding erythema or drainage  Back: No tenderness, No CVA tenderness. Extremities: No cyanosis, Normal perfusion, No clubbing. Musculoskeletal: Good range of motion in all major joints as observed. No major deformities noted. Neurologic: Awake and looks around the room but is nonverbal, does not follow commands for neurological testing but moves all 4 extremities with good muscle tone and no obvious focal deficits  Psychiatric: Confused  EKG  Per my interpretation demonstrates sinus rhythm with PVCs at a rate of 94 bpm.  Normal axis. Mildly prolonged QTc interval 472. No acute ST segment changes.   RADIOLOGY  Labs Reviewed   CBC WITH AUTO DIFFERENTIAL - Abnormal; Notable for the following components:       Result Value    Hemoglobin 11.8 (*)     MCHC 30.0 (*)     RDW 15.9 (*)     Metamyelocytes Relative 2 (*)     Bands Relative 37 (*)     Lymphocytes % 6.0 (*)     Monocytes % 8.0 (*)     All other components within normal limits   BASIC METABOLIC PANEL - Abnormal; Notable for the following components:    Sodium 148 (*)     BUN 39 (*)     Creatinine 1.2 (*)     Est, Glom Filt Rate 46 (*)     Glucose 135 (*)     All other components within normal limits   BLOOD GAS, VENOUS - Abnormal; Notable for the following components:    pH, Ky 7.46 (*)     Base Exc, Mixed 3.1 (*)     HCO3, Venous 27.0 (*)     O2 Sat, Ky 84.7 (*)     All other components within normal limits   URINALYSIS - Abnormal; Notable for the following components:    Protein,  (*)     Leukocyte Esterase, Urine SMALL NUMBER OR AMOUNT OBSERVED (*)     All other components within normal limits   TROPONIN - Abnormal; Notable for the following components:    Troponin T 0.059 (*)     All other components within normal limits   MICROSCOPIC URINALYSIS - Abnormal; Notable for the following components:    WBC,  (*)     Bacteria, UA RARE (*)     Mucus, UA RARE (*)     All other components within normal limits   BLOOD GAS, VENOUS - Abnormal; Notable for the following components:    pH, Ky 7.31 (*)     pCO2, Ky 58 (*)     HCO3, Venous 29.2 (*)     O2 Sat, Ky 40.2 (*)     All other components within normal limits   TROPONIN - Abnormal; Notable for the following components:    Troponin T 0.043 (*)     All other components within normal limits   TROPONIN - Abnormal; Notable for the following components:    Troponin T 0.036 (*)     All other components within normal limits   C-REACTIVE PROTEIN - Abnormal; Notable for the following components:    CRP, High Sensitivity 152.5 (*)     All other components within normal limits   RESPIRATORY PANEL, MOLECULAR, WITH COVID-19   CULTURE, BLOOD 1   CULTURE, BLOOD 2   CULTURE, URINE   CULTURE, MRSA, SCREENING   HEPATIC FUNCTION PANEL   LIPASE   LACTATE, SEPSIS   LACTATE, SEPSIS   PROCALCITONIN   PROCALCITONIN   ELECTROLYTES URINE RANDOM   CREATININE, RANDOM URINE   TROPONIN   LACTATE, SEPSIS   LACTATE, SEPSIS   TROPONIN   VANCOMYCIN LEVEL, RANDOM   LACTATE, SEPSIS   TROPONIN     I personally reviewed the images. The radiologist's interpretation reveals:  Last Imaging results   CT HEAD WO CONTRAST   Final Result   No acute intracranial abnormality.       Generalized cerebral atrophy with chronic microvascular ischemic changes. 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 XR CHEST PORTABLE   Final Result   Right greater than left bibasilar airspace disease may represent pneumonia   and or atelectasis. MEDS GIVEN IN ED:  Medications   lactated ringers infusion ( IntraVENous New Bag 10/26/22 2150)   sodium chloride flush 0.9 % injection 5-40 mL (5 mLs IntraVENous Not Given 10/26/22 2104)   sodium chloride flush 0.9 % injection 5-40 mL (has no administration in time range)   0.9 % sodium chloride infusion ( IntraVENous Not Given 10/26/22 2059)   heparin (porcine) injection 5,000 Units (5,000 Units SubCUTAneous Given 10/26/22 2101)   acetaminophen (TYLENOL) tablet 500 mg (has no administration in time range)     Or   acetaminophen (TYLENOL) suppository 487.5 mg (has no administration in time range)   meropenem (MERREM) 500 mg IVPB (mini-bag) (0 mg IntraVENous Stopped 10/26/22 1729)   pantoprazole (PROTONIX) 40 mg in sodium chloride (PF) 10 mL injection (40 mg IntraVENous Given 10/26/22 0947)   vancomycin (VANCOCIN) intermittent dosing (placeholder) (has no administration in time range)   acetaminophen (TYLENOL) suppository 650 mg (650 mg Rectal Given 10/26/22 0107)   0.9 % sodium chloride bolus (0 mLs IntraVENous Stopped 10/26/22 0231)   meropenem (MERREM) 1,000 mg in sodium chloride 0.9 % 100 mL IVPB (mini-bag) (0 mg IntraVENous Stopped 10/26/22 0204)   vancomycin (VANCOCIN) 1,000 mg in dextrose 5 % 250 mL IVPB ([id number]) (0 mg IntraVENous Stopped 10/26/22 0644)   doxycycline (VIBRAMYCIN) 100 mg in dextrose 5 % 100 mL IVPB (0 mg IntraVENous Stopped 10/26/22 0543)     COURSE & MEDICAL DECISION MAKING  79-year-old female with history of advanced vascular dementia presents emergency department from her nursing facility with concerns for fever throughout the day. She arrives here via EMS with initial blood pressure of 78/49 although she is afebrile with temp of 98.1. She is saturating at 98% on room air. She has areas of old bruising throughout the head and face with her recent admission on 10/16 for frequent falls. The patient is unable to provide any history and is currently nonverbal although she is awake and looking around the room moving all 4 extremities with good muscle tone no obvious focal deficits. She does have diminished breath sounds bilaterally without definitive wheezing, rhonchi, or rales. At this time we will obtain CT of the head, CT of the cervical spine, chest x-ray, CBC, BMP, hepatic panel, lipase, VBG, lactic acid, blood cultures, respiratory panel, urinalysis. Her CT of the head and cervical spine are without acute pathology. Chest x-ray demonstrates right greater than left bibasilar airspace disease which is certainly concerning for pneumonia given the reports of fever earlier today. Her urinalysis demonstrates pyuria and bacteriuria and was sent for culture. VBG is without acidosis or hypercapnia. CBC is without leukocytosis. BMP demonstrates mild acute kidney injury with a creatinine of 1.2 up from 0.5 with elevated BUN of 39. Hepatic panel lipase are reassuring. Lactic acid is nonelevated. EKG is without acute ischemic changes. Troponin is elevated 0.059. Respiratory panel was negative. At this time patient was provided with IV fluid resuscitation and I did order meropenem, vancomycin, and doxycycline. These antibiotics were selected to cover for HCAP as well as possible ESBL UTI as the patient does have a history of this given her urinalysis today. Her blood pressure improved with fluid resuscitation. At this time she will require hospitalization for further management. Her case was discussed with hospitalist team who agreed to hospitalize patient for further management.         Critical Care Time: I have personally provided 45 minutes of critical care time (excluding separately listed billable procedures) through obtaining a history, examining the patient, reviewing relevant medical records, discussing care with family and/or other providers, performing multiple reassessments and directing care. Amount and/or Complexity of Data Reviewed  Clinical lab tests: reviewed  Decide to obtain previous medical records or to obtain history from someone other than the patient: yes       -  Patient seen and evaluated in the emergency department. -  Triage and nursing notes reviewed and incorporated. -  Old chart records reviewed and incorporated. -  Work-up included:  See above  -  Results discussed with patient. Appropriate PPE utilized as indicated for entire patient encounter? Time of Disposition: See timeline  ? Current Discharge Medication List        FINAL IMPRESSION  1. Encephalopathy    2. Bacteriuria    3. Pneumonia of right lower lobe due to infectious organism    4. Transient hypotension        Electronically signed by:  [address], DO, 10/26/2022         [address], DO  10/26/22 1446

## 2022-10-26 NOTE — PROGRESS NOTES
[address] [facility]  consulted by Dr. Letty Linares MD for monitoring and adjustment. Indication for treatment: Sepsis (UTI vs Pneumonia)  Goal trough: [] 10-15 mcg/mL or [x] 15-20 mcg/ml  AUC/MICHEL: [] <500 or [x] 400-600    Pertinent Laboratory Values:   Temp Readings from Last 3 Encounters:   10/26/22 98.1 °F (36.7 °C) (Oral)   10/20/22 97.6 °F (36.4 °C) (Axillary)   10/14/22 96.9 °F (36.1 °C) (Axillary)     Recent Labs     10/26/22  0030   WBC 8.5     Recent Labs     10/26/22  0030   BUN 39*   CREATININE 1.2*     Estimated Creatinine Clearance: 24 mL/min (A) (based on SCr of 1.2 mg/dL (H)). Intake/Output Summary (Last 24 hours) at 10/26/2022 2992  Last data filed at 10/26/2022 0148  Gross per 24 hour   Intake --   Output 1000 ml   Net -1000 ml       Pertinent Cultures:  Date    Source    Results  10/26   Blood    Collected  10/26   Urine    In process  10/26   RESP, Molecular  None detected  10/26   MRSA Screen (Nasal)  To be collected    Assessment:  SCr = 1.2, BUN = 39, and limited I/O data:  CHRIS (Likely prerenal); Baseline SCr ~ 0.5 - 0.6  Day(s) of therapy: 1 of 7  Vancomycin concentration:   10/27 - To be collected    Plan:  Vancomycin 1,000 mg IV initial dose; Plan for intermittent Vancomycin dosing based on levels  Pharmacy will continue to monitor patient and adjust therapy as indicated    Maggy 3 10/27/2022 @ 6 AM    Thank you for the consult.   Maira Norwood, [facility]  10/26/2022 6:19 AM

## 2022-10-26 NOTE — PROGRESS NOTES
Received patient from ED via in bed with transporter. Patient yelling and pulling at line, trying to climb out of bed. Patient had soiled attends on with dried food on face, patient cool to touch. Skin assessment is scattered bruising, scatted scabs, bruise on right eye, coccyx red and wet from urine. Patient bated at this time with warm blankets provided, face washed, landon care completed. This nurse let change nurse Nathaniel Hirsch RN aware of patient pulling at lines, yelling, and trying to climb out of bed. Patient has two IV in right and left forearms, right IV is infusing LR at 150 ml /hr and left IV is saline locked. Bed alarm is on, bed in lowest position, and three of of the four bed rails up for patient safety. Will continue to monitor patient. Vital signs- HR- 90 ( NSR), BP - 132/51 (76), Temp -97.2, RR- 19, SpO2- 93 % on 2 liters of nasal canula, when patient keeps on finger.

## 2022-10-26 NOTE — PROGRESS NOTES
Speech Language Pathology      Swallowing assessment deferred by RN due to pt agitation, inability to participate. Will follow.     Michele Oconnell MA [facility] SLP  Speech Language Pathologist

## 2022-10-26 NOTE — H&P
V2.0  History and Physical      Name:  Michelle Armendariz /Age/Sex: 1940  (80 y.o. female)   MRN & CSN:  [id number] & [id number] Encounter Date/Time: 10/26/2022 5:25 AM EDT   Location:   PCP: Enrique Gillis, [address] Day: 1    Assessment and Plan:   Michelle Armendariz is a 80 y.o. female with a pmh of PCOM aneurysm, advanced dementia, nursing home resident with recent admission for encephalopathy, ESBL, expressive aphasia, history of ESBL UTI who presents with Toxic metabolic encephalopathy    Hospital Problems             Last Modified POA    * (Principal) Toxic metabolic encephalopathy  Yes       Acute toxic metabolic encephalopathy:  -Likely in the setting of sepsis secondary to UTI versus pneumonia  -Admit to stepdown unit  -CT head as above  -Telemetry  -Follow blood cultures/urine cultures  -Status post-vancomycin/meropenem  -Continue with vancomycin dosed per pharmacy/meropenem  -Continue IV LR at 150 mill per hour  -Target MAP> 65 mmHg  -Lactate 1.9, no leukocytosis  -Follow fever/WBC curve  -If patient persistently hypotensive despite IV fluids, consider pressors and ICU transfer  -Monitor closely for fluid overload  -N.p.o.  -Fall/aspiration precautions  -SLP eval once mental status improves  -If mental status does not improve with IV fluids and antibiotics, consider neurology consult  -Try to reach out to nursing home regarding further information  -PT/OT after patient improves  CHRIS:  -Likely prerenal in the setting of infection/dehydration  -Baseline creatinine 0.5-0.6, currently creatinine 1.2  -Strict intake/output record  -Avoid nephrotoxic medications  -Calculate FeNa  -Monitor BMP status post IV fluids  Troponinemia:  -No new significant EKG changes  -We will trend tropes  Sodium 146:  -Likely in the setting of decreased p.o. intake dehydration  -Monitor BMP for sodium status post IV fluids  History of multiple compression fractures T9-T12, L2-L4  -TLSO brace whenever upright or out of bed, plan for outpatient follow-up  GERD:  -IV PPI  0.8 cm solid nodule, noted on CT thoracic on previous admission:  -Defer management to outpatient if family interested  HLD:  -Azithromycin  Dementia:  -Namenda 5 mg daily  Constipation:  -GlycoLax/Senokot  Miscellaneous:  -Buspirone/Depakote/ASA  Hold p.o. medications until SLP eval    Disposition:   Current Living situation: NH  Expected Disposition: NH  Estimated D/C: TBD    Diet Diet NPO   DVT Prophylaxis [] Lovenox, [x]  Heparin, [] SCDs, [] Ambulation,  [] Eliquis, [] Xarelto, [] Coumadin   Code Status Full Code   Surrogate Decision Maker/ POA Grand daughter Bryan Medico     History from:     patient, electronic medical record, ED report    History of Present Illness:     Chief Complaint: Elmer Weaver is a 80 y.o. female with a pmh of PCOM aneurysm, advanced dementia, nursing home resident with recent admission for encephalopathy, ESBL, expressive aphasia, history of ESBL UTI who presents from the nursing home after reportedly having febrile episode with fever up to 104F, staff not available at the time of admission for confirmation. Patient is reported to be alert and cooperative although confused and able to move all extremities at baseline. Patient was noticed to be more confused and not following any commands. Vital signs in the ED with hypotension responding to IV fluids with repeat blood pressure 98/86 mmHg, heart rate 94/minute, VBG 7.3 1/58/29, labs with sodium 148, BUN 39/creatinine 1.2(baseline 0.5-0.6), troponin 0.059, hemoglobin 11.8, no leukocytosis WBC 8.5K, respiratory viral panel unremarkable, UA with 243 WBC, rare bacteria and small leukocyte esterase, urine cultures pending, chest x-ray with new bibasilar right> left airspace disease, pneumonia versus atelectasis, CT head with\"No acute intracranial abnormality.  Generalized cerebral atrophy with chronic microvascular ischemic changes. 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 Patient received IV NS 1 L with improvement of MAP and status post vancomycin and meropenem based on previous urine cultures. Patient will be admitted to stepdown unit for further management. Review of Systems: Need 10 Elements   Review of Systems    Unable to perform as patient unable to provide information    Objective: Intake/Output Summary (Last 24 hours) at 10/26/2022 0525  Last data filed at 10/26/2022 0148  Gross per 24 hour   Intake --   Output 1000 ml   Net -1000 ml      Vitals:   Vitals:    10/26/22 0145 10/26/22 0215 10/26/22 0230 10/26/22 0300   BP:  (!) 101/59 (!) 106/90 98/86   Pulse: 91 93 92 94   Resp: 23 22 24 21   Temp:       TempSrc:       SpO2: 100% 100%     Weight:       Height:           Medications Prior to Admission     Prior to Admission medications    Medication Sig Start Date End Date Taking?  Authorizing Provider   polyethylene glycol (GLYCOLAX) 17 g packet Take 17 g by mouth daily 10/21/22 11/20/22  Amna Funk MD   senna (SENOKOT) 8.6 MG tablet Take 1 tablet by mouth nightly 10/20/22 11/19/22  Amna Funk MD   divalproex (DEPAKOTE SPRINKLE) 125 MG capsule Take 2 capsules by mouth nightly 10/20/22   Amna Funk MD   divalproex (DEPAKOTE SPRINKLE) 125 MG capsule Take 1 capsule by mouth Daily with supper for 7 days 9/19/22 9/26/22  Maryan Crigler, MD   busPIRone (BUSPAR) 5 MG tablet Take 1 tablet by mouth 3 times daily 3/17/21   KALI Handley - CNP   divalproex (DEPAKOTE SPRINKLE) 125 MG capsule Take 2 capsules by mouth daily 3/18/21   KALI Handley - CNP   doxepin [facility]) 10 MG capsule Take 1 capsule by mouth nightly 3/17/21   Jessica Elizabeth APRN - CNP   memantine [facility]) 5 MG tablet Take 1 tablet by mouth daily 3/18/21   KALI Handley - CNP   aspirin 81 MG chewable tablet Take 1 tablet by mouth daily 6/14/20   Nidhi Norris MD   Calcium Carbonate-Vitamin D 500-125 MG-UNIT TABS Take 1 tablet by mouth daily    Historical Provider, MD   ezetimibe (ZETIA) 10 MG tablet Take 10 mg by mouth daily    Historical Provider, MD   omeprazole (PRILOSEC) 40 MG delayed release capsule Take 40 mg by mouth daily 6/3/20   Historical Provider, MD       Physical Exam: Need 8 Elements   Physical Exam     General: NAD  Eyes: EOMI  ENT: neck supple  Cardiovascular: Regular rate. Respiratory: Clear to auscultation  Gastrointestinal: Soft, non tender  Genitourinary: no suprapubic tenderness  Musculoskeletal: No edema  Skin: warm, dry  Neuro: Not following commands, confused  Psych: Mood appropriate. Past Medical History:   PMHx   Past Medical History:   Diagnosis Date    Anterior communicating artery aneurysm 06/09/2020    ESBL (extended spectrum beta-lactamase) producing bacteria infection 08/19/2021    urine; 8/23/21    Expressive aphasia 06/09/2020     PSHX:  has no past surgical history on file. Allergies: Allergies   Allergen Reactions    Ativan [Lorazepam] Other (See Comments)     Psychotic Reaction/Combative    Vicodin [Hydrocodone-Acetaminophen] Other (See Comments)     Psychotic reaction    Valium [Diazepam] Other (See Comments)     Ineffective     Fam HX:  family history is not on file.   Soc HX:   Social History     Socioeconomic History    Marital status: Single     Spouse name: None    Number of children: None    Years of education: None    Highest education level: None   Tobacco Use    Smoking status: Former     Types: Cigarettes    Smokeless tobacco: Never   Substance and Sexual Activity    Alcohol use: Not Currently    Drug use: Never    Sexual activity: Not Currently       Medications:   Medications:    vancomycin  1,000 mg IntraVENous Once    doxycycline (VIBRAMYCIN) IV  100 mg IntraVENous Once    sodium chloride flush  5-40 mL IntraVENous 2 times per day    heparin (porcine)  5,000 Units SubCUTAneous BID    pantoprazole  40 mg IntraVENous Daily  meropenem  1,000 mg IntraVENous Q8H      Infusions:    lactated ringers 500 mL (10/26/22 0429)    sodium chloride       PRN Meds: sodium chloride flush, 5-40 mL, PRN  sodium chloride, , PRN  acetaminophen, 650 mg, Q6H PRN   Or  acetaminophen, 650 mg, Q6H PRN        Labs      CBC:   Recent Labs     10/26/22  0030   WBC 8.5   HGB 11.8*        BMP:    Recent Labs     10/26/22  0030   *   K 4.2      CO2 24   BUN 39*   CREATININE 1.2*   GLUCOSE 135*     Hepatic:   Recent Labs     10/26/22  0030   AST 27   ALT 13   BILITOT 0.4   ALKPHOS 69     Lipids:   Lab Results   Component Value Date/Time    HDL 65 03/10/2021 06:00 AM     Hemoglobin A1C:   Lab Results   Component Value Date/Time    LABA1C 4.9 03/10/2021 06:00 AM     TSH: No results found for: TSH  Troponin:   Lab Results   Component Value Date/Time    TROPONINT 0.059 10/26/2022 12:30 AM    TROPONINT <0.010 09/17/2022 09:32 AM    TROPONINT 0.012 09/16/2022 03:10 PM     Lactic Acid: No results for input(s): LACTA in the last 72 hours. BNP: No results for input(s): PROBNP in the last 72 hours.   UA:  Lab Results   Component Value Date/Time    NITRU NEGATIVE 10/26/2022 01:25 AM    COLORU YELLOW 10/26/2022 01:25 AM    WBCUA 243 10/26/2022 01:25 AM    RBCUA NONE SEEN 10/26/2022 01:25 AM    MUCUS RARE 10/26/2022 01:25 AM    TRICHOMONAS NONE SEEN 10/26/2022 01:25 AM    YEAST OCCASIONAL 03/29/2021 08:00 PM    BACTERIA RARE 10/26/2022 01:25 AM    CLARITYU CLEAR 10/26/2022 01:25 AM    SPECGRAV 1.020 10/26/2022 01:25 AM    LEUKOCYTESUR SMALL NUMBER OR AMOUNT OBSERVED 10/26/2022 01:25 AM    UROBILINOGEN 0.2 10/26/2022 01:25 AM    BILIRUBINUR NEGATIVE 10/26/2022 01:25 AM    BLOODU NEGATIVE 10/26/2022 01:25 AM    KETUA NEGATIVE 10/26/2022 01:25 AM     Urine Cultures: No results found for: LABURIN  Blood Cultures: No results found for: BC  No results found for: BLOODCULT2  Organism: No results found for: ORG    Imaging/Diagnostics Last 24 Hours   CT HEAD WO CONTRAST    Result Date: 10/26/2022  No acute intracranial abnormality. Generalized cerebral atrophy with chronic microvascular ischemic changes. 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 CT THORACIC SPINE WO CONTRAST    Result Date: 10/19/2022  EXAMINATION: CT OF THE LUMBAR SPINE WITHOUT CONTRAST; CT OF THE THORACIC SPINE WITHOUT CONTRAST  10/19/2022 TECHNIQUE: CT of the lumbar spine was performed without the administration of intravenous contrast. Multiplanar reformatted images are provided for review. Adjustment of mA and/or kV according to patient size was utilized. Automated exposure control, iterative reconstruction, and/or weight based adjustment of the mA/kV was utilized to reduce the radiation dose to as low as reasonably achievable.; CT of the thoracic spine was performed without the administration of intravenous contrast. Multiplanar reformatted images are provided for review. Automated exposure control, iterative reconstruction, and/or weight based adjustment of the mA/kV was utilized to reduce the radiation dose to as low as reasonably achievable. COMPARISON: 10/14/2022 HISTORY: ORDERING SYSTEM PROVIDED HISTORY: multiple thoracolumbar fractures TECHNOLOGIST PROVIDED HISTORY: Reason for exam:->multiple thoracolumbar fractures Reason for Exam: multiple thoracolumbar fractures Additional signs and symptoms: UNABLE TO FOLLOW COMMANDS Relevant Medical/Surgical History: POOR HISTORIAN FINDINGS: BONES/ALIGNMENT: There is normal alignment of the spine. Similar complete burst fractures T9 and T12. Probable Schmorl's node of the superior endplate of T35. Similar mild incomplete burst fractures of L2 through L4. No osseous destructive lesion is seen. Diffuse osteopenia. DEGENERATIVE CHANGES: Mild-to-moderate degenerative changes of the lumbar spine. SOFT TISSUES/RETROPERITONEUM: Solid and subsolid pulmonary nodules again noted. Recommend follow-up according to prior chest CT.   Partially imaged coronary artery calcification. Small hiatal hernia. .     No significant interval change in thoracolumbar incomplete and complete burst fractures. RECOMMENDATIONS: Unavailable     CT LUMBAR SPINE WO CONTRAST    Result Date: 10/19/2022  EXAMINATION: CT OF THE LUMBAR SPINE WITHOUT CONTRAST; CT OF THE THORACIC SPINE WITHOUT CONTRAST  10/19/2022 TECHNIQUE: CT of the lumbar spine was performed without the administration of intravenous contrast. Multiplanar reformatted images are provided for review. Adjustment of mA and/or kV according to patient size was utilized. Automated exposure control, iterative reconstruction, and/or weight based adjustment of the mA/kV was utilized to reduce the radiation dose to as low as reasonably achievable.; CT of the thoracic spine was performed without the administration of intravenous contrast. Multiplanar reformatted images are provided for review. Automated exposure control, iterative reconstruction, and/or weight based adjustment of the mA/kV was utilized to reduce the radiation dose to as low as reasonably achievable. COMPARISON: 10/14/2022 HISTORY: ORDERING SYSTEM PROVIDED HISTORY: multiple thoracolumbar fractures TECHNOLOGIST PROVIDED HISTORY: Reason for exam:->multiple thoracolumbar fractures Reason for Exam: multiple thoracolumbar fractures Additional signs and symptoms: UNABLE TO FOLLOW COMMANDS Relevant Medical/Surgical History: POOR HISTORIAN FINDINGS: BONES/ALIGNMENT: There is normal alignment of the spine. Similar complete burst fractures T9 and T12. Probable Schmorl's node of the superior endplate of J94. Similar mild incomplete burst fractures of L2 through L4. No osseous destructive lesion is seen. Diffuse osteopenia. DEGENERATIVE CHANGES: Mild-to-moderate degenerative changes of the lumbar spine. SOFT TISSUES/RETROPERITONEUM: Solid and subsolid pulmonary nodules again noted. Recommend follow-up according to prior chest CT.   Partially imaged coronary artery calcification. Small hiatal hernia. .     No significant interval change in thoracolumbar incomplete and complete burst fractures. RECOMMENDATIONS: Unavailable     XR CHEST PORTABLE    Result Date: 10/26/2022  EXAMINATION: ONE XRAY VIEW OF THE CHEST 10/26/2022 12:48 am COMPARISON: 09/16/2022 and CT chest/thoracic spine 10/14/2022 HISTORY: ORDERING SYSTEM PROVIDED HISTORY: fever TECHNOLOGIST PROVIDED HISTORY: Reason for exam:->fever Reason for Exam: fever FINDINGS: There is right greater than left bibasilar airspace disease. There is unchanged elevation of the right hemidiaphragm. There is no pneumothorax or pleural effusion. The heart size is normal.  Bone findings noted on the recent CT study are beneath the resolution of the portable chest x-ray. Right greater than left bibasilar airspace disease may represent pneumonia and or atelectasis.        Personally reviewed Lab Studies, Imaging    Electronically signed by Maryana Mckenzie MD on 10/26/2022 at 5:25 AM

## 2022-10-26 NOTE — ED NOTES
ED TO INPATIENT SBAR HANDOFF    Patient Name: Elisha Frost   :  1940  80 y.o. MRN:  [id number]  Preferred Name  Diana Esparza  ED Room #:  TR01/01TR-01  Family/Caregiver Present no   Restraints no   Sitter no   Sepsis Risk Score Sepsis Risk Score: 2.46    Situation  Code Status: Prior No additional code details. Allergies: Ativan [lorazepam], Vicodin [hydrocodone-acetaminophen], and Valium [diazepam]  Weight: Patient Vitals for the past 96 hrs (Last 3 readings):   Weight   10/26/22 0048 90 lb (40.8 kg)     Arrived from: nursing home  Chief Complaint: No chief complaint on file. Hospital Problem/Diagnosis:  Active Problems:    * No active hospital problems. *  Resolved Problems:    * No resolved hospital problems. *    Imaging:   CT HEAD WO CONTRAST   Preliminary Result   No acute intracranial abnormality. Generalized cerebral atrophy with chronic microvascular ischemic changes. 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 XR CHEST PORTABLE   Final Result   Right greater than left bibasilar airspace disease may represent pneumonia   and or atelectasis.            Abnormal labs:   Abnormal Labs Reviewed   CBC WITH AUTO DIFFERENTIAL - Abnormal; Notable for the following components:       Result Value    Hemoglobin 11.8 (*)     MCHC 30.0 (*)     RDW 15.9 (*)     Metamyelocytes Relative 2 (*)     Bands Relative 37 (*)     Lymphocytes % 6.0 (*)     Monocytes % 8.0 (*)     All other components within normal limits   BASIC METABOLIC PANEL - Abnormal; Notable for the following components:    Sodium 148 (*)     BUN 39 (*)     Creatinine 1.2 (*)     Est, Glom Filt Rate 46 (*)     Glucose 135 (*)     All other components within normal limits   BLOOD GAS, VENOUS - Abnormal; Notable for the following components:    pH, Ky 7.46 (*)     Base Exc, Mixed 3.1 (*)     HCO3, Venous 27.0 (*)     O2 Sat, Ky 84.7 (*)     All other components within normal limits   URINALYSIS - Abnormal; Notable for the following components:    Protein,  (*)     Leukocyte Esterase, Urine SMALL NUMBER OR AMOUNT OBSERVED (*)     All other components within normal limits   TROPONIN - Abnormal; Notable for the following components:    Troponin T 0.059 (*)     All other components within normal limits   MICROSCOPIC URINALYSIS - Abnormal; Notable for the following components:    WBC,  (*)     Bacteria, UA RARE (*)     Mucus, UA RARE (*)     All other components within normal limits   BLOOD GAS, VENOUS - Abnormal; Notable for the following components:    pH, Ky 7.31 (*)     pCO2, Ky 58 (*)     HCO3, Venous 29.2 (*)     O2 Sat, Ky 40.2 (*)     All other components within normal limits     Critical values: no     Abnormal Assessment Findings: see chart      Background  History:   Past Medical History:   Diagnosis Date    Anterior communicating artery aneurysm 06/09/2020    ESBL (extended spectrum beta-lactamase) producing bacteria infection 08/19/2021    urine; 8/23/21    Expressive aphasia 06/09/2020       Assessment    Vitals/MEWS:        Vitals:    10/26/22 0145 10/26/22 0215 10/26/22 0230 10/26/22 0300   BP:  (!) 101/59 (!) 106/90 98/86   Pulse: 91 93 92 94   Resp: 23 22 24 21   Temp:       TempSrc:       SpO2: 100% 100%     Weight:       Height:         FiO2 (%): na  O2 Flow Rate: O2 Device: Nasal cannula O2 Flow Rate (L/min): 4 L/min  Cardiac Rhythm:    Pain Assessment: na [] Verbal [x] Casi Zheng Scale  Pain Scale: Pain Assessment  Patient's Stated Pain Goal: Unable to verbalize/indicate pain goal  Last documented pain score (0-10 scale)    Last documented pain medication administered: na  Mental Status: disoriented  NIH Score:    C-SSRS:    Bedside swallow:    [place] Coma Scale (GCS): Active LDA's:   Peripheral IV 10/26/22 Right Forearm (Active)   Site Assessment Clean, dry & intact 10/26/22 0030   Line Status Blood return noted; Flushed;Normal saline locked;Specimen collected 10/26/22 0030   Dressing Status Clean, dry & intact 10/26/22 0030   Dressing Type Transparent 10/26/22 0030   Dressing Intervention New 10/26/22 0030       Peripheral IV 10/26/22 Left Antecubital (Active)   Site Assessment Clean, dry & intact 10/26/22 0041   Line Status Blood return noted; Flushed;Normal saline locked;Specimen collected 10/26/22 0041   Dressing Status Clean, dry & intact 10/26/22 0041   Dressing Type Transparent 10/26/22 0041   Dressing Intervention New 10/26/22 0041     PO Status: Liquid Consistency: Nectar Thick Liquids  Pertinent or High Risk Medications/Drips: no   o If Yes, please provide details: na  Pending Blood Product Administration: no     You may also review the ED PT Care Timeline found under the Summary Nursing Index tab. Recommendation    Pending orders na    Plan for Discharge (if known):    Additional Comments: na   If any further questions, please call Sending RN at [phone]    Electronically signed by: Electronically signed by Eleanor Diamond RN on 10/26/2022 at 5:07 AM     Eleanor Diamond RN  10/26/22 0594

## 2022-10-26 NOTE — PROGRESS NOTES
I have seen and evaluated patient, I agree with my colleagues note from earlier today Dr. Ethel Turner, I agree with his assessment and plan, physical examination, etc.  Patient is being treated for UTI awaiting urine cultures after dirty urine analysis, patient reported altered before she was brought in from facility, she also did have an CHRIS that we are following on with BMP likely in setting of dehydration. Troponin did trend down to 0.049, Pro-Bird is 1.9. We will continue with IV antibiotics for now and await cultures to try and de-escalate as appropriate. I have personally talked to the legal guardian Ms. Cortez today, and we have discussed CODE STATUS and agreed on changing the CODE STATUS to DNR comfort care as seen appropriate by the legal guardian. I have changed the CODE STATUS in the system to DNR comfort care.

## 2022-10-26 NOTE — CARE COORDINATION
Patient known to this CM from recent admission. She is from  [address] [place] [address] (dementia) Per notes- patient is now a DNR CC per guardian Esme Vieira. Anticipate return to [facility] .  Cheri Fowler RN

## 2022-10-26 NOTE — ED TRIAGE NOTES
EMS called for fever. 80 F at facility. 80 F via EMS . Facility has not given any meds for fever. Pt has hx of aphasia and previous spinal fracture.  02 was 89 en route, EMS started 2 L NC.

## 2022-10-27 LAB
ANION GAP SERPL CALCULATED.3IONS-SCNC: 10 MMOL/L (ref 4–16)
ANION GAP SERPL CALCULATED.3IONS-SCNC: 10 MMOL/L (ref 4–16)
BASOPHILS ABSOLUTE: 0 K/CU MM
BASOPHILS RELATIVE PERCENT: 0.4 % (ref 0–1)
BUN BLDV-MCNC: 27 MG/DL (ref 6–23)
BUN BLDV-MCNC: 28 MG/DL (ref 6–23)
CALCIUM SERPL-MCNC: 8.9 MG/DL (ref 8.3–10.6)
CALCIUM SERPL-MCNC: 8.9 MG/DL (ref 8.3–10.6)
CHLORIDE BLD-SCNC: 111 MMOL/L (ref 99–110)
CHLORIDE BLD-SCNC: 113 MMOL/L (ref 99–110)
CO2: 25 MMOL/L (ref 21–32)
CO2: 25 MMOL/L (ref 21–32)
CREAT SERPL-MCNC: 0.5 MG/DL (ref 0.6–1.1)
CREAT SERPL-MCNC: 0.5 MG/DL (ref 0.6–1.1)
DIFFERENTIAL TYPE: ABNORMAL
DOSE AMOUNT: NORMAL
DOSE TIME: NORMAL
EKG ATRIAL RATE: 94 BPM
EKG DIAGNOSIS: NORMAL
EKG P AXIS: 55 DEGREES
EKG P-R INTERVAL: 128 MS
EKG Q-T INTERVAL: 378 MS
EKG QRS DURATION: 72 MS
EKG QTC CALCULATION (BAZETT): 472 MS
EKG R AXIS: 17 DEGREES
EKG T AXIS: 65 DEGREES
EKG VENTRICULAR RATE: 94 BPM
EOSINOPHILS ABSOLUTE: 0 K/CU MM
EOSINOPHILS RELATIVE PERCENT: 0.4 % (ref 0–3)
GFR SERPL CREATININE-BSD FRML MDRD: >60 ML/MIN/1.73M2
GFR SERPL CREATININE-BSD FRML MDRD: >60 ML/MIN/1.73M2
GLUCOSE BLD-MCNC: 66 MG/DL (ref 70–99)
GLUCOSE BLD-MCNC: 69 MG/DL (ref 70–99)
HCT VFR BLD CALC: 36.3 % (ref 37–47)
HEMOGLOBIN: 10.3 GM/DL (ref 12.5–16)
IMMATURE NEUTROPHIL %: 0.4 % (ref 0–0.43)
LACTIC ACID, SEPSIS: 0.8 MMOL/L (ref 0.5–1.9)
LACTIC ACID, SEPSIS: 0.9 MMOL/L (ref 0.5–1.9)
LACTIC ACID, SEPSIS: 1 MMOL/L (ref 0.5–1.9)
LYMPHOCYTES ABSOLUTE: 0.8 K/CU MM
LYMPHOCYTES RELATIVE PERCENT: 16.8 % (ref 24–44)
MCH RBC QN AUTO: 27.3 PG (ref 27–31)
MCHC RBC AUTO-ENTMCNC: 28.4 % (ref 32–36)
MCV RBC AUTO: 96.3 FL (ref 78–100)
MONOCYTES ABSOLUTE: 0.4 K/CU MM
MONOCYTES RELATIVE PERCENT: 8 % (ref 0–4)
NUCLEATED RBC %: 0 %
PDW BLD-RTO: 15.2 % (ref 11.7–14.9)
PLATELET # BLD: 165 K/CU MM (ref 140–440)
PMV BLD AUTO: 9.4 FL (ref 7.5–11.1)
POTASSIUM SERPL-SCNC: 3.2 MMOL/L (ref 3.5–5.1)
POTASSIUM SERPL-SCNC: 3.2 MMOL/L (ref 3.5–5.1)
RBC # BLD: 3.77 M/CU MM (ref 4.2–5.4)
SEGMENTED NEUTROPHILS ABSOLUTE COUNT: 3.7 K/CU MM
SEGMENTED NEUTROPHILS RELATIVE PERCENT: 74 % (ref 36–66)
SODIUM BLD-SCNC: 146 MMOL/L (ref 135–145)
SODIUM BLD-SCNC: 148 MMOL/L (ref 135–145)
TOTAL IMMATURE NEUTOROPHIL: 0.02 K/CU MM
TOTAL NUCLEATED RBC: 0 K/CU MM
TROPONIN T: 0.02 NG/ML
TROPONIN T: 0.03 NG/ML
TROPONIN T: 0.03 NG/ML
VANCOMYCIN RANDOM: 6.4 UG/ML
WBC # BLD: 5 K/CU MM (ref 4–10.5)

## 2022-10-27 PROCEDURE — 6370000000 HC RX 637 (ALT 250 FOR IP): Performed by: STUDENT IN AN ORGANIZED HEALTH CARE EDUCATION/TRAINING PROGRAM

## 2022-10-27 PROCEDURE — 84484 ASSAY OF TROPONIN QUANT: CPT

## 2022-10-27 PROCEDURE — A4216 STERILE WATER/SALINE, 10 ML: HCPCS | Performed by: STUDENT IN AN ORGANIZED HEALTH CARE EDUCATION/TRAINING PROGRAM

## 2022-10-27 PROCEDURE — 2580000003 HC RX 258: Performed by: STUDENT IN AN ORGANIZED HEALTH CARE EDUCATION/TRAINING PROGRAM

## 2022-10-27 PROCEDURE — 80202 ASSAY OF VANCOMYCIN: CPT

## 2022-10-27 PROCEDURE — 83605 ASSAY OF LACTIC ACID: CPT

## 2022-10-27 PROCEDURE — 93010 ELECTROCARDIOGRAM REPORT: CPT | Performed by: INTERNAL MEDICINE

## 2022-10-27 PROCEDURE — 92610 EVALUATE SWALLOWING FUNCTION: CPT

## 2022-10-27 PROCEDURE — 36415 COLL VENOUS BLD VENIPUNCTURE: CPT

## 2022-10-27 PROCEDURE — 6360000002 HC RX W HCPCS: Performed by: STUDENT IN AN ORGANIZED HEALTH CARE EDUCATION/TRAINING PROGRAM

## 2022-10-27 PROCEDURE — 2700000000 HC OXYGEN THERAPY PER DAY

## 2022-10-27 PROCEDURE — 2580000003 HC RX 258: Performed by: EMERGENCY MEDICINE

## 2022-10-27 PROCEDURE — 80048 BASIC METABOLIC PNL TOTAL CA: CPT

## 2022-10-27 PROCEDURE — C9113 INJ PANTOPRAZOLE SODIUM, VIA: HCPCS | Performed by: STUDENT IN AN ORGANIZED HEALTH CARE EDUCATION/TRAINING PROGRAM

## 2022-10-27 PROCEDURE — 2060000000 HC ICU INTERMEDIATE R&B

## 2022-10-27 PROCEDURE — 94761 N-INVAS EAR/PLS OXIMETRY MLT: CPT

## 2022-10-27 PROCEDURE — 85025 COMPLETE CBC W/AUTO DIFF WBC: CPT

## 2022-10-27 RX ORDER — DIVALPROEX SODIUM 125 MG/1
250 CAPSULE, COATED PELLETS ORAL DAILY
Status: DISCONTINUED | OUTPATIENT
Start: 2022-10-27 | End: 2022-10-29 | Stop reason: HOSPADM

## 2022-10-27 RX ORDER — PANTOPRAZOLE SODIUM 40 MG/1
40 TABLET, DELAYED RELEASE ORAL
Status: DISCONTINUED | OUTPATIENT
Start: 2022-10-28 | End: 2022-10-29 | Stop reason: HOSPADM

## 2022-10-27 RX ORDER — BUSPIRONE HYDROCHLORIDE 5 MG/1
5 TABLET ORAL 3 TIMES DAILY
Status: DISCONTINUED | OUTPATIENT
Start: 2022-10-27 | End: 2022-10-29 | Stop reason: HOSPADM

## 2022-10-27 RX ORDER — EZETIMIBE 10 MG/1
10 TABLET ORAL DAILY
Status: DISCONTINUED | OUTPATIENT
Start: 2022-10-27 | End: 2022-10-29 | Stop reason: HOSPADM

## 2022-10-27 RX ORDER — DOXEPIN HYDROCHLORIDE 10 MG/1
10 CAPSULE ORAL NIGHTLY
Status: DISCONTINUED | OUTPATIENT
Start: 2022-10-27 | End: 2022-10-29 | Stop reason: HOSPADM

## 2022-10-27 RX ORDER — DEXTROSE MONOHYDRATE 50 MG/ML
INJECTION, SOLUTION INTRAVENOUS CONTINUOUS
Status: DISCONTINUED | OUTPATIENT
Start: 2022-10-27 | End: 2022-10-29

## 2022-10-27 RX ORDER — ASPIRIN 81 MG/1
81 TABLET, CHEWABLE ORAL DAILY
Status: DISCONTINUED | OUTPATIENT
Start: 2022-10-27 | End: 2022-10-29 | Stop reason: HOSPADM

## 2022-10-27 RX ORDER — DIVALPROEX SODIUM 125 MG/1
125 CAPSULE, COATED PELLETS ORAL
Status: DISCONTINUED | OUTPATIENT
Start: 2022-10-27 | End: 2022-10-29 | Stop reason: HOSPADM

## 2022-10-27 RX ORDER — MEMANTINE HYDROCHLORIDE 10 MG/1
5 TABLET ORAL DAILY
Status: DISCONTINUED | OUTPATIENT
Start: 2022-10-27 | End: 2022-10-29 | Stop reason: HOSPADM

## 2022-10-27 RX ADMIN — HEPARIN SODIUM 5000 UNITS: 5000 INJECTION INTRAVENOUS; SUBCUTANEOUS at 20:22

## 2022-10-27 RX ADMIN — MEROPENEM 500 MG: 500 INJECTION, POWDER, FOR SOLUTION INTRAVENOUS at 13:17

## 2022-10-27 RX ADMIN — DOXEPIN HYDROCHLORIDE 10 MG: 10 CAPSULE ORAL at 20:23

## 2022-10-27 RX ADMIN — HEPARIN SODIUM 5000 UNITS: 5000 INJECTION INTRAVENOUS; SUBCUTANEOUS at 09:24

## 2022-10-27 RX ADMIN — MEROPENEM 500 MG: 500 INJECTION, POWDER, FOR SOLUTION INTRAVENOUS at 01:56

## 2022-10-27 RX ADMIN — VANCOMYCIN HYDROCHLORIDE 750 MG: 750 INJECTION, POWDER, LYOPHILIZED, FOR SOLUTION INTRAVENOUS at 05:56

## 2022-10-27 RX ADMIN — SODIUM CHLORIDE, POTASSIUM CHLORIDE, SODIUM LACTATE AND CALCIUM CHLORIDE: 600; 310; 30; 20 INJECTION, SOLUTION INTRAVENOUS at 07:41

## 2022-10-27 RX ADMIN — SODIUM CHLORIDE, PRESERVATIVE FREE 10 ML: 5 INJECTION INTRAVENOUS at 20:23

## 2022-10-27 RX ADMIN — BUSPIRONE HYDROCHLORIDE 5 MG: 5 TABLET ORAL at 20:23

## 2022-10-27 RX ADMIN — SODIUM CHLORIDE, PRESERVATIVE FREE 40 MG: 5 INJECTION INTRAVENOUS at 09:23

## 2022-10-27 RX ADMIN — DEXTROSE MONOHYDRATE: 50 INJECTION, SOLUTION INTRAVENOUS at 13:14

## 2022-10-27 RX ADMIN — DIVALPROEX SODIUM 125 MG: 125 CAPSULE, COATED PELLETS ORAL at 18:41

## 2022-10-27 ASSESSMENT — PAIN SCALES - WONG BAKER
WONGBAKER_NUMERICALRESPONSE: 0

## 2022-10-27 NOTE — PROGRESS NOTES
V2.0  [facility] Hospitalist Progress Note      Name:  Dinah Gandhi /Age/Sex: 1940  (80 y.o. female)   MRN & CSN:  [id number] & [id number] Encounter Date/Time: 10/27/2022 8:44 AM EDT    Location:  [address] [place] [address] PCP: Akbar Shine, [address] [facility] Day: 2    Assessment and Plan:   Dinah Gandhi is a 80 y.o. female with a pmh of PCOM aneurysm, advanced dementia, nursing home resident with recent admission for encephalopathy, ESBL, expressive aphasia, history of ESBL UTI who presents with encephalopathy and UTI. Plan:    Acute encephalopathy: Likely metabolic in the setting of CHRIS and UTI  UTI  Pyuria  -Likely in the setting of sepsis secondary to UTI   -CT head negative  -Telemetry  -Follow blood cultures/urine cultures with more than 100,000 colonies of GNR's, awaiting sensitivities, analysis with 243 WBC.    -Status post-vancomycin/meropenem  -Continue with vancomycin dosed per pharmacy/meropenem for now given history  -Continue IV LR at 150 mill per hour  -Target MAP> 65 mmHg  -Lactate 1.9, no leukocytosis  -Fall/aspiration precautions        -N.p.o. until speech evaluation        -If mental status does not improve with IV fluids and antibiotics, consider neurology consult    CHRIS: Resolved  -Likely prerenal in the setting of infection/dehydration  -Baseline creatinine 0.5-0.6, currently creatinine 1.2  -Strict intake/output record    Troponinemia:  -No new significant EKG changes  -We will trend tropes    Sodium 146:  -Likely in the setting of decreased p.o. intake dehydration  -Patient on D5, awaiting resuming oral hydration, monitor with BMPs    History of multiple compression fractures T9-T12, L2-L4  -TLSO brace whenever upright or out of bed, plan for outpatient follow-up    GERD:  -IV PPI    0.8 cm solid nodule, noted on CT thoracic on previous admission:  -Defer management to outpatient if family interested    HLD:  -Statin    Dementia:  -Namenda 5 mg daily    Constipation:  -GlycoLax/Senokot    Miscellaneous:  -Buspirone/Depakote/ASA        CODE STATUS changed to DNR and comfort care after discussion with guardian 10/26. Diet Diet NPO   DVT Prophylaxis [] Lovenox, [x]  Heparin, [] SCDs, [] Ambulation,  [] Eliquis, [] Xarelto  [] Coumadin   Code Status DNR-CC   Disposition From: home  Expected Disposition: home  Estimated Date of Discharge: 2-3 days  Patient requires continued admission due to acute encephalopathy and CHRIS   Surrogate Decision Maker/ DELROY Moser     Subjective:     Chief Complaint: No chief complaint on file. Bandar Jain is a 80 y.o. female who presents with encephalopathy and UTI. Review of Systems:    Review of Systems    Unable to obtain details given clinical state    Objective:   No intake or output data in the 24 hours ending 10/27/22 0844     Vitals:   Vitals:    10/27/22 0423   BP:    Pulse: 77   Resp: 15   Temp:    SpO2: 100%       Physical Exam:     General: NAD  Eyes: EOMI  ENT: neck supple  Cardiovascular: Normal rate regular rhythm  Respiratory: Clear to auscultation  Gastrointestinal: Soft, non tender  Genitourinary: no suprapubic tenderness  Musculoskeletal: No edema  Skin: warm, dry  Neuro: Alert. Mumbles words but does not really respond to orientation questions, nonfocal exam moves all 4 extremities  Psych: Mood appropriate.      Medications:   Medications:    sodium chloride flush  5-40 mL IntraVENous 2 times per day    heparin (porcine)  5,000 Units SubCUTAneous BID    meropenem  500 mg IntraVENous Q12H    pantoprazole (PROTONIX) 40 mg injection  40 mg IntraVENous Daily    vancomycin (VANCOCIN) intermittent dosing (placeholder)   Other See Admin Instructions      Infusions:    lactated ringers 150 mL/hr at 10/27/22 0741    sodium chloride       PRN Meds: sodium chloride flush, 5-40 mL, PRN  sodium chloride, , PRN  acetaminophen, 500 mg, Q6H PRN   Or  acetaminophen, 487.5 mg, Q6H PRN        Labs Recent Results (from the past 24 hour(s))   Procalcitonin    Collection Time: 10/26/22  9:28 AM   Result Value Ref Range    Procalcitonin 1.93    Troponin    Collection Time: 10/26/22  1:18 PM   Result Value Ref Range    Troponin T 0.036 (H) <0.01 NG/ML   Lactate, Sepsis    Collection Time: 10/26/22  1:18 PM   Result Value Ref Range    Lactic Acid, Sepsis 1.5 0.5 - 1.9 mMOL/L   Lactate, Sepsis    Collection Time: 10/27/22 12:05 AM   Result Value Ref Range    Lactic Acid, Sepsis 0.8 0.5 - 1.9 mMOL/L   Troponin    Collection Time: 10/27/22 12:05 AM   Result Value Ref Range    Troponin T 0.034 (H) <0.01 NG/ML   Vancomycin Level, Random    Collection Time: 10/27/22 12:05 AM   Result Value Ref Range    Vancomycin Rm 6.4 UG/ML    DOSE AMOUNT DOSE AMT. GIVEN - 1,000 mg     DOSE TIME DOSE TIME GIVEN - UNKNOWN         Imaging/Diagnostics Last 24 Hours   CT HEAD WO CONTRAST    Result Date: 10/26/2022  EXAMINATION: CT OF THE HEAD WITHOUT CONTRAST  10/26/2022 2:37 am TECHNIQUE: CT of the head was performed without the administration of intravenous contrast. Automated exposure control, iterative reconstruction, and/or weight based adjustment of the mA/kV was utilized to reduce the radiation dose to as low as reasonably achievable. COMPARISON: CT head dated October 16, 2022. HISTORY: ORDERING SYSTEM PROVIDED HISTORY: AMS TECHNOLOGIST PROVIDED HISTORY: Reason for exam:->AMS Has a \"code stroke\" or \"stroke alert\" been called? ->No Decision Support Exception - unselect if not a suspected or confirmed emergency medical condition->Emergency Medical Condition (MA) Reason for Exam: AMS FINDINGS: BRAIN/VENTRICLES: There is no acute intracranial hemorrhage, mass effect or midline shift. No abnormal extra-axial fluid collection. The gray-white differentiation is maintained without evidence of an acute infarct. There is no evidence of hydrocephalus. Generalized cerebral atrophy is noted with chronic microvascular ischemic changes. There is redemonstration of an anterior communicating artery aneurysm measuring 1 cm. This is not significantly changed. ORBITS: The visualized portion of the orbits demonstrate no acute abnormality. SINUSES: The visualized paranasal sinuses and mastoid air cells demonstrate no acute abnormality. SOFT TISSUES/SKULL:  No acute abnormality of the visualized skull or soft tissues. No acute intracranial abnormality. Generalized cerebral atrophy with chronic microvascular ischemic changes. 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 XR CHEST PORTABLE    Result Date: 10/26/2022  EXAMINATION: ONE XRAY VIEW OF THE CHEST 10/26/2022 12:48 am COMPARISON: 09/16/2022 and CT chest/thoracic spine 10/14/2022 HISTORY: ORDERING SYSTEM PROVIDED HISTORY: fever TECHNOLOGIST PROVIDED HISTORY: Reason for exam:->fever Reason for Exam: fever FINDINGS: There is right greater than left bibasilar airspace disease. There is unchanged elevation of the right hemidiaphragm. There is no pneumothorax or pleural effusion. The heart size is normal.  Bone findings noted on the recent CT study are beneath the resolution of the portable chest x-ray. Right greater than left bibasilar airspace disease may represent pneumonia and or atelectasis.        Electronically signed by Cary Garcia MD on 10/27/2022 at 8:44 AM

## 2022-10-27 NOTE — PROGRESS NOTES
Comprehensive Nutrition Assessment    Type and Reason for Visit:  Initial (low BMI)    Nutrition Recommendations/Plan:   Resume diet if/when appropriate as per speech therapy  May offer frozen and pudding oral nutrition supplements when able to tolerate diet  Will continue to follow up during stay      Malnutrition Assessment:  Malnutrition Status:  Severe malnutrition (ongoing problem) (10/27/22 1038)    Context:  Chronic Illness       Nutrition Assessment:    Admit with hx falls, UTIs, encephalopathy. Currently NPO with plan for speech therapy swallow eval. During admit last week, was able to tolerate pureed diet, mildly thick liquids with total assist for feeding. Underweight with hx severe malnutrition in chronic illness. Will follow at high nutrition risk at this time. Nutrition Related Findings:    resting in bed, hx vascular dementia, exp aphasia, agitation, code status changed during admit Wound Type: None       Current Nutrition Intake & Therapies:    Average Meal Intake: NPO  Average Supplements Intake: NPO  Diet NPO    Anthropometric Measures:  Height: 5' 2\" (157.5 cm)  Ideal Body Weight (IBW): 110 lbs (50 kg)       Current Body Weight: 89 lb 15.2 oz (40.8 kg), 81.8 % IBW.  Weight Source: Bed Scale (admit last week)  Current BMI (kg/m2): 16.4  Usual Body Weight:  (# in past 2 years per hx)     Weight Adjustment For: No Adjustment                 BMI Categories: Underweight (BMI less than 18.5)    Estimated Daily Nutrient Needs:  Energy Requirements Based On: Kcal/kg  Weight Used for Energy Requirements: Current  Energy (kcal/day): 2810-8991 (30-35 belén/kg)  Weight Used for Protein Requirements: Current  Protein (g/day): 61 (1.5 g/kg)  Method Used for Fluid Requirements: 1 ml/kcal  Fluid (ml/day): 1400    Nutrition Diagnosis:   Severe malnutrition, In context of chronic illness related to cognitive or neurological impairment, inadequate protein-energy intake as evidenced by severe muscle loss, severe loss of subcutaneous fat    Nutrition Interventions:   Food and/or Nutrient Delivery: Continue NPO (resume diet if/when able as per SLP)  Nutrition Education/Counseling: Education not appropriate  Coordination of Nutrition Care: Continue to monitor while inpatient, Speech Therapy       Goals:     Goals: PO intake 50% or greater, prior to discharge       Nutrition Monitoring and Evaluation:   Behavioral-Environmental Outcomes: None Identified  Food/Nutrient Intake Outcomes: Diet Advancement/Tolerance  Physical Signs/Symptoms Outcomes: Biochemical Data, Chewing or Swallowing, Meal Time Behavior, Skin, Weight, Nutrition Focused Physical Findings    Discharge Planning:     Too soon to determine     Jayde Rodriguez RD, LD  Contact: [phone]

## 2022-10-27 NOTE — PROGRESS NOTES
[address] [facility]  consulted by Dr. Jose Manuel Karimi MD for monitoring and adjustment. Indication for treatment: Sepsis (UTI vs Pneumonia)  Goal trough: [] 10-15 mcg/mL or [x] 15-20 mcg/ml  AUC/MICHEL: [] <500 or [x] 400-600    Pertinent Laboratory Values:   Temp Readings from Last 3 Encounters:   10/27/22 97.1 °F (36.2 °C) (Axillary)   10/20/22 97.6 °F (36.4 °C) (Axillary)   10/14/22 96.9 °F (36.1 °C) (Axillary)     Recent Labs     10/26/22  0030   WBC 8.5     Recent Labs     10/26/22  0030   BUN 39*   CREATININE 1.2*     Estimated Creatinine Clearance: 24 mL/min (A) (based on SCr of 1.2 mg/dL (H)). No intake or output data in the 24 hours ending 10/27/22 0421    Pertinent Cultures:  Date                             Source                                     Results  10/26                           Blood                                       Collected  10/26                           Urine                                        In process  10/26                           RESP, Molecular                     None detected  10/26                           MRSA Screen (Nasal)             To be collected    Vancomycin level:   RANDOM:    Recent Labs     10/27/22  0005   VANCORANDOM 6.4       Assessment:  SCr, BUN, and urine output:   CHRIS (Likely prerenal); Baseline ~ 0.5 - 0.6  BMP ordered for this morning  Day(s) of therapy: 2 of 7  Vancomycin concentration:   10/27 - 6.4 (18.5 hours post Vancomycin 1,000 mg IV dose)    Plan:  Re-dose with Vancomycin 750 mg IV today; Check random level with morning labs on 10/28/2022  BMP ordered; Continue to monitor renal function  Pharmacy will continue to monitor patient and adjust therapy as indicated    Maggy 3 10/28/2022 @ 6 AM    Thank you for the consult.   Farrah Govea, [facility]  10/27/2022 4:21 AM

## 2022-10-27 NOTE — PROGRESS NOTES
Speech Language Pathology  Facility/Department: [facility] ICU STEPDOWN   CLINICAL BEDSIDE SWALLOW EVALUATION    NAME: Yoly Goldman  : 1940  MRN: [id number]    IMPRESSIONS AND RECOMMENDATIONS: Yoly Goldman was referred for a bedside swallow evaluation following admission to [facility] with acute toxic metabolic encephalopathy, sepsis, UTI, PNA. Medical hx includes dementia, GERD, HLD. He was seen by SLP during recent admission and recommended pureed diet/nectar thick liquids at that time. Pt seen for evaluation seated upright in bed, alert. She did not follow directions and did not participate in oral mechanism examination despite verbal cues and models. She was presented with PO trials of ice chips, thin liquids via tsp/cup, nectar thick liquids via cup/straw, and pureed consistencies. Oral stage moderately impaired with adequate labial seal, slow lingual manipulation/AP transit, suspected premature pharyngeal entry with liquids. She was noted to take large impulsive drinks via straw and benefited from direct assistance to reduce bolus size. Suspect pharyngeal dysphagia with slowed swallow initiation and reduced laryngeal elevation. Immediate coughing noted intermittently following drinks of thin liquids. Recommend initiation of pureed diet/nectar thick liquids. Total feed and provide assistance for appropriate rate of intake with liquids as needed. SLP will follow. ADMISSION DATE: 10/26/2022  ADMITTING DIAGNOSIS: has AMS (altered mental status); Severe malnutrition ([address] Expressive aphasia; Anterior communicating artery aneurysm; Advanced dementia; Subcortical vascular dementia with behavioral disturbance;  Other sequelae of other cerebrovascular disease; Encephalopathy; and Toxic metabolic encephalopathy on their problem list.  ONSET DATE: this admission    Recent Chest Xray/CT of Chest: see chart    Date of Eval: 10/27/2022  Evaluating Therapist: ANABEL Means    Current Diet level:  Current Diet : NPO      Primary Complaint  Patient Complaint: does not state    Pain:  Pain Assessment  Pain Assessment: None - Denies Pain  Lowery-Vee Pain Rating: No hurt  Patient's Stated Pain Goal: Unable to verbalize/indicate pain goal  Faces, Legs, Activity, Cry, and Consolability (FLACC)  Face (F): no particular expression or smile  Legs (L): normal position or relaxed  Activity (A): lying quietly, normal position, moves easily  Cry (C): no cry (awake or asleep)  Consolability (C): content, relaxed  FLACC Score : 0    Reason for Referral  Comfort Rodríguez was referred for a bedside swallow evaluation to assess the efficiency of her swallow function, identify signs and symptoms of aspiration and make recommendations regarding safe dietary consistencies, effective compensatory strategies, and safe eating environment. Impression  Dysphagia Diagnosis: Moderate oral stage dysphagia;Mild to moderate pharyngeal stage dysphagia  Dysphagia Outcome Severity Scale: Level 4: Mild moderate dysphagia- Intermittent supervision/cueing. One - two diet consistencies restricted     Treatment Plan  Requires SLP Intervention: Yes  Duration of Treatment: LOS  D/C Recommendations: 24 hour supervision/assistance; Ongoing speech therapy is recommended during this hospitalization       Recommended Diet and Intervention        Recommended Form of Meds: Meds in puree  Recommendations: Total feed  Therapeutic Interventions: Diet tolerance monitoring; Therapeutic PO trials with SLP;Patient/Family education    Compensatory Swallowing Strategies  Compensatory Swallowing Strategies : Small bites/sips; Total feed;Upright as possible for all oral intake;Eat/Feed slowly    Treatment/Goals  Short-term Goals  Timeframe for Short-term Goals: length of admission  Goal 1: Pt will tolerate pureed diet/nectar thick liquids with adequate oral manipulation/clearance and no s/s aspiration.   Goal 2: Pt will tolerate PO trials of advanced textures with adequate oral manipulation/clearance and no s/s aspiration for possible diet upgrade. Goal 3: Caregivers will indicate understanding of all education/recommendations. General  Chart Reviewed: Yes  Behavior/Cognition: Alert; Doesn't follow directions;Confused  Respiratory Status: O2 via nasual cannula  O2 Device: Nasal cannula  Communication Observation:  (cognitive communication deficits)  Follows Directions: None  Dentition: Edentulous  Patient Positioning: Upright in bed  Prior Dysphagia History: yes; seen by SLP recent admission  Consistencies Administered: Pureed;Mildly Thick - straw;Mildly Thick - cup; Thin - cup; Thin - straw; Thin - teaspoon; Ice Chips           Vision/Hearing  YAZ    Oral Motor Deficits  YAZ    Oral Phase Dysfunction  Oral Phase  Oral Phase: Exceptions  Oral Phase  Oral Phase - Comment: impaired     Indicators of Pharyngeal Phase Dysfunction   Pharyngeal Phase   Pharyngeal Phase: impaired    Prognosis      Education  Patient Education: results, recommendations, plan  Patient Education Response: No evidence of learning  Safety Devices in place: Yes  Type of devices: All fall risk precautions in place; Left in bed       Therapy Time  SLP Individual Minutes  Time In: 1000  Time Out: 4737  Minutes: [place], [facility]-SLP  10/27/2022 1:03 PM

## 2022-10-28 LAB
CULTURE: ABNORMAL
CULTURE: ABNORMAL
DOSE AMOUNT: NORMAL
DOSE TIME: NORMAL
GLUCOSE BLD-MCNC: 100 MG/DL (ref 70–99)
LACTIC ACID, SEPSIS: 1 MMOL/L (ref 0.5–1.9)
LACTIC ACID, SEPSIS: 1 MMOL/L (ref 0.5–1.9)
LACTIC ACID, SEPSIS: 1.5 MMOL/L (ref 0.5–1.9)
Lab: ABNORMAL
SARS-COV-2, NAAT: NOT DETECTED
SOURCE: NORMAL
SPECIMEN: ABNORMAL
TROPONIN T: 0.03 NG/ML
VANCOMYCIN RANDOM: 5.2 UG/ML

## 2022-10-28 PROCEDURE — 6360000002 HC RX W HCPCS: Performed by: STUDENT IN AN ORGANIZED HEALTH CARE EDUCATION/TRAINING PROGRAM

## 2022-10-28 PROCEDURE — 84484 ASSAY OF TROPONIN QUANT: CPT

## 2022-10-28 PROCEDURE — 2700000000 HC OXYGEN THERAPY PER DAY

## 2022-10-28 PROCEDURE — 36415 COLL VENOUS BLD VENIPUNCTURE: CPT

## 2022-10-28 PROCEDURE — 83605 ASSAY OF LACTIC ACID: CPT

## 2022-10-28 PROCEDURE — 2580000003 HC RX 258: Performed by: STUDENT IN AN ORGANIZED HEALTH CARE EDUCATION/TRAINING PROGRAM

## 2022-10-28 PROCEDURE — 6370000000 HC RX 637 (ALT 250 FOR IP): Performed by: STUDENT IN AN ORGANIZED HEALTH CARE EDUCATION/TRAINING PROGRAM

## 2022-10-28 PROCEDURE — 82962 GLUCOSE BLOOD TEST: CPT

## 2022-10-28 PROCEDURE — C9113 INJ PANTOPRAZOLE SODIUM, VIA: HCPCS | Performed by: STUDENT IN AN ORGANIZED HEALTH CARE EDUCATION/TRAINING PROGRAM

## 2022-10-28 PROCEDURE — 80202 ASSAY OF VANCOMYCIN: CPT

## 2022-10-28 PROCEDURE — 94761 N-INVAS EAR/PLS OXIMETRY MLT: CPT

## 2022-10-28 PROCEDURE — 2060000000 HC ICU INTERMEDIATE R&B

## 2022-10-28 PROCEDURE — 87635 SARS-COV-2 COVID-19 AMP PRB: CPT

## 2022-10-28 RX ORDER — CIPROFLOXACIN 500 MG/1
500 TABLET, FILM COATED ORAL EVERY 12 HOURS SCHEDULED
Qty: 8 TABLET | Refills: 0 | Status: SHIPPED | OUTPATIENT
Start: 2022-10-28 | End: 2022-11-01

## 2022-10-28 RX ORDER — CIPROFLOXACIN 500 MG/1
500 TABLET, FILM COATED ORAL EVERY 12 HOURS SCHEDULED
Status: DISCONTINUED | OUTPATIENT
Start: 2022-10-28 | End: 2022-10-29 | Stop reason: HOSPADM

## 2022-10-28 RX ORDER — POTASSIUM CHLORIDE 7.45 MG/ML
10 INJECTION INTRAVENOUS
Status: COMPLETED | OUTPATIENT
Start: 2022-10-28 | End: 2022-10-28

## 2022-10-28 RX ORDER — POTASSIUM CHLORIDE 20 MEQ/1
40 TABLET, EXTENDED RELEASE ORAL 2 TIMES DAILY
Status: DISCONTINUED | OUTPATIENT
Start: 2022-10-28 | End: 2022-10-28

## 2022-10-28 RX ADMIN — DEXTROSE MONOHYDRATE: 50 INJECTION, SOLUTION INTRAVENOUS at 05:47

## 2022-10-28 RX ADMIN — SODIUM CHLORIDE, PRESERVATIVE FREE 40 MG: 5 INJECTION INTRAVENOUS at 10:44

## 2022-10-28 RX ADMIN — HEPARIN SODIUM 5000 UNITS: 5000 INJECTION INTRAVENOUS; SUBCUTANEOUS at 10:45

## 2022-10-28 RX ADMIN — PANTOPRAZOLE SODIUM 40 MG: 40 TABLET, DELAYED RELEASE ORAL at 05:58

## 2022-10-28 RX ADMIN — EZETIMIBE 10 MG: 10 TABLET ORAL at 10:40

## 2022-10-28 RX ADMIN — POTASSIUM CHLORIDE 10 MEQ: 7.46 INJECTION, SOLUTION INTRAVENOUS at 14:23

## 2022-10-28 RX ADMIN — ASPIRIN 81 MG CHEWABLE TABLET 81 MG: 81 TABLET CHEWABLE at 10:40

## 2022-10-28 RX ADMIN — SODIUM CHLORIDE, PRESERVATIVE FREE 10 ML: 5 INJECTION INTRAVENOUS at 21:58

## 2022-10-28 RX ADMIN — DOXEPIN HYDROCHLORIDE 10 MG: 10 CAPSULE ORAL at 21:58

## 2022-10-28 RX ADMIN — SODIUM CHLORIDE, PRESERVATIVE FREE 10 ML: 5 INJECTION INTRAVENOUS at 10:48

## 2022-10-28 RX ADMIN — MEROPENEM 500 MG: 500 INJECTION, POWDER, FOR SOLUTION INTRAVENOUS at 02:23

## 2022-10-28 RX ADMIN — POTASSIUM BICARBONATE 40 MEQ: 782 TABLET, EFFERVESCENT ORAL at 21:58

## 2022-10-28 RX ADMIN — DIVALPROEX SODIUM 250 MG: 125 CAPSULE, COATED PELLETS ORAL at 10:36

## 2022-10-28 RX ADMIN — BUSPIRONE HYDROCHLORIDE 5 MG: 5 TABLET ORAL at 21:57

## 2022-10-28 RX ADMIN — BUSPIRONE HYDROCHLORIDE 5 MG: 5 TABLET ORAL at 10:41

## 2022-10-28 RX ADMIN — HEPARIN SODIUM 5000 UNITS: 5000 INJECTION INTRAVENOUS; SUBCUTANEOUS at 21:46

## 2022-10-28 RX ADMIN — POTASSIUM CHLORIDE 10 MEQ: 7.46 INJECTION, SOLUTION INTRAVENOUS at 15:46

## 2022-10-28 RX ADMIN — Medication 1 TABLET: at 10:40

## 2022-10-28 RX ADMIN — CIPROFLOXACIN HYDROCHLORIDE 500 MG: 500 TABLET, FILM COATED ORAL at 21:57

## 2022-10-28 ASSESSMENT — PAIN SCALES - WONG BAKER: WONGBAKER_NUMERICALRESPONSE: 0

## 2022-10-28 ASSESSMENT — PAIN SCALES - GENERAL
PAINLEVEL_OUTOF10: 0
PAINLEVEL_OUTOF10: 0

## 2022-10-28 NOTE — DISCHARGE SUMMARY
V2.0  Discharge Summary    Name:  Comfort Rodríguez /Age/Sex: 1940 (80 y.o. female)   Admit Date: 10/26/2022  Discharge Date: 10/28/22    MRN & CSN:  [id number] & [id number] Encounter Date and Time 10/28/22 12:30 PM EDT    Attending:  Susan Hashimoto, MD Discharging Provider: Susan Hashimoto, MD       Hospital Course:     Brief HPI: Comfort Rodríguez is a 80 y.o. female with a pmh of PCOM aneurysm, advanced dementia, nursing home resident with recent admission for encephalopathy, ESBL, expressive aphasia, history of ESBL UTI who presents from the nursing home after reportedly having febrile episode with fever up to 104F, staff not available at the time of admission for confirmation. Patient is reported to be alert and cooperative although confused and able to move all extremities at baseline. Patient was noticed to be more confused and not following any commands. Vital signs in the ED with hypotension responding to IV fluids with repeat blood pressure 98/86 mmHg, heart rate 94/minute, VBG 7.3 , labs with sodium 148, BUN 39/creatinine 1.2(baseline 0.5-0.6), troponin 0.059, hemoglobin 11.8, no leukocytosis WBC 8.5K, respiratory viral panel unremarkable, UA with 243 WBC, rare bacteria and small leukocyte esterase, urine cultures pending, chest x-ray with new bibasilar right> left airspace disease, pneumonia versus atelectasis, CT head with\"No acute intracranial abnormality. Generalized cerebral atrophy with chronic microvascular ischemic changes. 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 Patient received IV NS 1 L with improvement of MAP and status post vancomycin and meropenem based on previous urine cultures. Patient will be admitted to stepdown unit for further management.     Brief Problem Based Course:   Acute encephalopathy: Likely metabolic in the setting of CHRIS and UTI  UTI  Pyuria  -Likely in the setting of sepsis secondary to UTI   -CT head negative  -Telemetry  -Follow blood cultures/urine cultures with more than 100,000 colonies of GNR's, awaiting sensitivities, analysis with 243 WBC. -Status post-vancomycin/meropenem in the ED  -Continue meropenem, de-escalated from Vancomycin 11/28 given GNRs in urine  -Continue IVF D5 @100 ml/hr while inpatient  -Fall/aspiration precautions        -Speech evaluated and dysphagia diet ordered  Patient grew Pseudomonas and urine eventually and was susceptible to ciprofloxacin so she was switched to Cipro to complete 4 more days and a total of 7 days of treatment for UTI. Patient was noted to be more alert awake and conversant day of discharge compared to previous days. Transient hypoxia 10/28 that prevented patient from dc to facility, she was transferred there then transferred back because of an O2 sat of 88%, patient is on RA 10/29 sating % and discharged again. CHRIS: Resolved  -Likely prerenal in the setting of infection/dehydration  -Baseline creatinine 0.5-0.6, currently creatinine 1.2  -Strict intake/output record     Troponinemia: likely type 2 MI, plateaued   -No new significant EKG changes     Hypernatremia  -Likely in the setting of decreased p.o. intake dehydration  -Patient on D5, encourage oral hydration   Now that patient is more awake concern is low for continued hyponatremia as she should be taking more oral hydration.       History of multiple compression fractures T9-T12, L2-L4  -TLSO brace whenever upright or out of bed, plan for outpatient follow-up     GERD:  -IV PPI     0.8 cm solid nodule, noted on CT thoracic on previous admission:  -Defer management to outpatient if family interested     HLD:  -Statin     Dementia:  -Namenda 5 mg daily     Constipation:  -GlycoLax/Senokot     Miscellaneous:  -Buspirone/Depakote/ASA       Consults this admission:  IP CONSULT TO HOSPITALIST  PHARMACY TO DOSE VANCOMYCIN    Discharge Diagnosis:   Toxic metabolic encephalopathy    UTI    Discharge Instruction:   Follow up appointments: With PCP as appropriate  Primary care physician: Halina Barthel, APRN - CNP within 2 weeks  Diet: regular diet   Activity: activity as tolerated  Disposition: Discharged to:   []Home, []C, [x]SNF, []Acute Rehab, []Hospice   Condition on discharge: Stable  Labs and Tests to be Followed up as an outpatient by PCP or Specialist: --    Discharge Medications:        Medication List        START taking these medications      ciprofloxacin 500 MG tablet  Commonly known as: CIPRO  Take 1 tablet by mouth every 12 hours for 8 doses            CHANGE how you take these medications      * divalproex 125 MG capsule  Commonly known as: DEPAKOTE SPRINKLE  Take 2 capsules by mouth daily  What changed: Another medication with the same name was removed. Continue taking this medication, and follow the directions you see here. * divalproex 125 MG capsule  Commonly known as: DEPAKOTE SPRINKLE  Take 2 capsules by mouth nightly  What changed: Another medication with the same name was removed. Continue taking this medication, and follow the directions you see here. * This list has 2 medication(s) that are the same as other medications prescribed for you. Read the directions carefully, and ask your doctor or other care provider to review them with you.                 CONTINUE taking these medications      aspirin 81 MG chewable tablet  Take 1 tablet by mouth daily     busPIRone 5 MG tablet  Commonly known as: BUSPAR  Take 1 tablet by mouth 3 times daily     Calcium Carbonate-Vitamin D 500-125 MG-UNIT Tabs     doxepin 10 MG capsule  Commonly known as: SINEQUAN  Take 1 capsule by mouth nightly     ezetimibe 10 MG tablet  Commonly known as: ZETIA     memantine 5 MG tablet  Commonly known as: NAMENDA  Take 1 tablet by mouth daily     omeprazole 40 MG delayed release capsule  Commonly known as: PRILOSEC     polyethylene glycol 17 g packet  Commonly known as: GLYCOLAX  Take 17 g by mouth daily     senna 8.6 MG tablet  Commonly known as: SENOKOT  Take 1 tablet by mouth nightly               Where to Get Your Medications        These medications were sent to [address] [facility] ([place]), [place] - [address]  [phone] - F [phone]  [facility] [address] ([address]) [place] [address]      Phone: [phone]   ciprofloxacin 500 MG tablet        Objective Findings at Discharge:   /68   Pulse 79   Temp (!) 96 °F (35.6 °C) (Oral)   Resp 26   Ht 5' 2\" (1.575 m)   Wt 92 lb 9.5 oz (42 kg)   SpO2 98%   BMI 16.94 kg/m²       Physical Exam:   General: NAD, frail elderly comfortable in bed  Eyes: EOMI  ENT: neck supple  Cardiovascular: Normal rate regular rhythm  Respiratory: Clear to auscultation  Gastrointestinal: Soft, non tender  Genitourinary: no suprapubic tenderness  Musculoskeletal: No edema  Skin: warm, dry  Neuro: Alert and awake. Mumbles words but does not really respond to orientation questions, nonfocal exam moves all 4 extremities  Psych: Mood appropriate. Labs and Imaging   CT HEAD WO CONTRAST    Result Date: 10/26/2022  EXAMINATION: CT OF THE HEAD WITHOUT CONTRAST  10/26/2022 2:37 am TECHNIQUE: CT of the head was performed without the administration of intravenous contrast. Automated exposure control, iterative reconstruction, and/or weight based adjustment of the mA/kV was utilized to reduce the radiation dose to as low as reasonably achievable. COMPARISON: CT head dated October 16, 2022. HISTORY: ORDERING SYSTEM PROVIDED HISTORY: AMS TECHNOLOGIST PROVIDED HISTORY: Reason for exam:->AMS Has a \"code stroke\" or \"stroke alert\" been called? ->No Decision Support Exception - unselect if not a suspected or confirmed emergency medical condition->Emergency Medical Condition (MA) Reason for Exam: AMS FINDINGS: BRAIN/VENTRICLES: There is no acute intracranial hemorrhage, mass effect or midline shift. No abnormal extra-axial fluid collection.   The gray-white differentiation is maintained without evidence of an acute infarct. There is no evidence of hydrocephalus. Generalized cerebral atrophy is noted with chronic microvascular ischemic changes. There is redemonstration of an anterior communicating artery aneurysm measuring 1 cm. This is not significantly changed. ORBITS: The visualized portion of the orbits demonstrate no acute abnormality. SINUSES: The visualized paranasal sinuses and mastoid air cells demonstrate no acute abnormality. SOFT TISSUES/SKULL:  No acute abnormality of the visualized skull or soft tissues. No acute intracranial abnormality. Generalized cerebral atrophy with chronic microvascular ischemic changes. 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 XR CHEST PORTABLE    Result Date: 10/26/2022  EXAMINATION: ONE XRAY VIEW OF THE CHEST 10/26/2022 12:48 am COMPARISON: 09/16/2022 and CT chest/thoracic spine 10/14/2022 HISTORY: ORDERING SYSTEM PROVIDED HISTORY: fever TECHNOLOGIST PROVIDED HISTORY: Reason for exam:->fever Reason for Exam: fever FINDINGS: There is right greater than left bibasilar airspace disease. There is unchanged elevation of the right hemidiaphragm. There is no pneumothorax or pleural effusion. The heart size is normal.  Bone findings noted on the recent CT study are beneath the resolution of the portable chest x-ray. Right greater than left bibasilar airspace disease may represent pneumonia and or atelectasis.        CBC:   Recent Labs     10/26/22  0030 10/27/22  0845   WBC 8.5 5.0   HGB 11.8* 10.3*    165     BMP:    Recent Labs     10/26/22  0030 10/27/22  0845 10/27/22  0853   * 146* 148*   K 4.2 3.2* 3.2*    111* 113*   CO2 24 25 25   BUN 39* 28* 27*   CREATININE 1.2* 0.5* 0.5*   GLUCOSE 135* 66* 69*     Hepatic:   Recent Labs     10/26/22  0030   AST 27   ALT 13   BILITOT 0.4   ALKPHOS 69     Lipids:   Lab Results   Component Value Date/Time    HDL 65 03/10/2021 06:00 AM     Hemoglobin A1C:   Lab Results Component Value Date/Time    LABA1C 4.9 03/10/2021 06:00 AM     TSH: No results found for: TSH  Troponin:   Lab Results   Component Value Date/Time    TROPONINT 0.025 10/28/2022 04:23 AM    TROPONINT 0.024 10/27/2022 03:32 PM    TROPONINT 0.034 10/27/2022 08:53 AM     Lactic Acid: No results for input(s): LACTA in the last 72 hours. BNP: No results for input(s): PROBNP in the last 72 hours.   UA:  Lab Results   Component Value Date/Time    NITRU NEGATIVE 10/26/2022 01:25 AM    COLORU YELLOW 10/26/2022 01:25 AM    WBCUA 243 10/26/2022 01:25 AM    RBCUA NONE SEEN 10/26/2022 01:25 AM    MUCUS RARE 10/26/2022 01:25 AM    TRICHOMONAS NONE SEEN 10/26/2022 01:25 AM    YEAST OCCASIONAL 03/29/2021 08:00 PM    BACTERIA RARE 10/26/2022 01:25 AM    CLARITYU CLEAR 10/26/2022 01:25 AM    SPECGRAV 1.020 10/26/2022 01:25 AM    LEUKOCYTESUR SMALL NUMBER OR AMOUNT OBSERVED 10/26/2022 01:25 AM    UROBILINOGEN 0.2 10/26/2022 01:25 AM    BILIRUBINUR NEGATIVE 10/26/2022 01:25 AM    BLOODU NEGATIVE 10/26/2022 01:25 AM    KETUA NEGATIVE 10/26/2022 01:25 AM     Urine Cultures: No results found for: LABURIN  Blood Cultures: No results found for: BC  No results found for: BLOODCULT2  Organism: No results found for: ORG    Time Spent Discharging patient 45 minutes    Electronically signed by Janet Cevallos MD on 10/28/2022 at 12:30 PM  45

## 2022-10-28 NOTE — PROGRESS NOTES
Called spoke with Elfego Arnold RN regarding Bed alarm- notified unable to find, RN is aware and will get another one. Pt has back brace at facility as well.

## 2022-10-28 NOTE — DISCHARGE INSTR - COC
Continuity of Care Form    Patient Name: Garcia Couch   :  1940  MRN:  [id number]    Admit date:  10/26/2022  Discharge date:  10/28/22    Code Status Order: [facility]   Advance Directives:     Admitting Physician:  No admitting provider for patient encounter. PCP: KALI Hou CNP    Discharging Nurse: [facility] Unit/Room#: 6907/5803-O  Discharging Unit Phone Number: ***    Emergency Contact:   Extended Emergency Contact Information  Primary Emergency Contact: real hurst  Home Phone: [phone]  Relation: Legal Guardian    Past Surgical History:  History reviewed. No pertinent surgical history. 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    Encephalopathy T43.00    Toxic metabolic encephalopathy X84.6       Isolation/Infection:   Isolation            Contact          Patient Infection Status       Infection Onset Added Last Indicated Last Indicated By Review Planned Expiration Resolved Resolved By    ESBL (Extended Spectrum Beta Lactamase) 21 Culture, Urine        Resolved    COVID-19 (Rule Out) 10/26/22 10/26/22 10/26/22 Respiratory Panel, Molecular, with COVID-19 (Restricted: peds pts or suitable admitted adults) (Ordered)   10/26/22 Rule-Out Test Resulted            Nurse Assessment:  Last Vital Signs: /68   Pulse 79   Temp (!) 96 °F (35.6 °C) (Oral)   Resp 26   Ht 5' 2\" (1.575 m)   Wt 92 lb 9.5 oz (42 kg)   SpO2 98%   BMI 16.94 kg/m²     Last documented pain score (0-10 scale): Pain Level: 0  Last Weight:   Wt Readings from Last 1 Encounters:   10/28/22 92 lb 9.5 oz (42 kg)     Mental Status:  disoriented and alert    IV Access:  - None    Nursing Mobility/ADLs:  Walking   Assisted  Transfer  Assisted  Bathing  Assisted  Dressing  Dependent  Toileting  Dependent  Feeding  Dependent  Med Admin  Dependent  Med Delivery   crushed    Wound Care Documentation and Therapy:  Wound 09/16/22 Ankle Anterior;Left;Outer Scabbed (Active)   Number of days: 42       Wound 09/16/22 Arm Anterior;Left; Inner; Outer; Upper; Lower multiple bruising throughout LEFT ARM due to patient fall at facility (Active)   Number of days: 41       Wound 09/16/22 Arm Anterior;Right; Lower; Upper; Outer; Inner multiple bruising throughout LEFT ARM due to patient fall at facility (Active)   Number of days: 41       Wound 09/16/22 Leg Anterior;Distal;Left;Right;Upper; Lower multiple bruising throughout BL LEG due to patient fall at facility (Active)   Number of days: 41        Elimination:  Continence: Bowel: No  Bladder: No  Urinary Catheter: None   Colostomy/Ileostomy/Ileal Conduit: No       Date of Last BM: ***  No intake or output data in the 24 hours ending 10/28/22 1222  No intake/output data recorded. Safety Concerns:      At Risk for Falls and Aspiration Risk    Impairments/Disabilities:      None        Patient's personal belongings (please select all that are sent with patient):  fariha Brantley RN SIGNATURE:  Electronically signed by Calli Heller RN on 10/28/22 at 1:58 PM EDT    CASE MANAGEMENT/SOCIAL WORK SECTION    Inpatient Status Date: 10/26/2022    Readmission Risk Assessment Score:  Readmission Risk              Risk of Unplanned Readmission:  24           Discharging to Facility/ Agency   Name:   Address:  Phone:  Fax:    Dialysis Facility (if applicable)   Name:  Address:  Dialysis Schedule:  Phone:  Fax:    / signature: Electronically signed by Benji Garcia RN on 10/28/22 at 1:25 PM EDT    PHYSICIAN SECTION    Prognosis: Guarded    Condition at Discharge: Stable    Rehab Potential (if transferring to Rehab): Guarded    Nutrition Therapy:  Current Nutrition Therapy:   [address] JANA Diet List:[id number]}    Routes of Feeding: {CHP DME Other Feedings:229973422}  Liquids: {Slp liquid thickness:34931}  Daily Fluid Restriction: {CHP DME Yes amt example:837508572}  Last Modified Barium Swallow with Video (Video Swallowing Test): {Done Not Done ZRKM:677975474}    Treatments at the Time of Hospital Discharge:   Respiratory Treatments: ***  Oxygen Therapy:  {Therapy; copd oxygen:42399}  Ventilator:    { CC Vent QUSX:152169953}    Rehab Therapies: {THERAPEUTIC INTERVENTION:0428916429}  Weight Bearing Status/Restrictions: [address] Weight Bearin}  Other Medical Equipment (for information only, NOT a DME order):  {EQUIPMENT:784640148}  Other Treatments: ***    Recommended Labs or Other Treatments After Discharge: -    Physician Certification: I certify the above information and transfer of Janes Diane  is necessary for the continuing treatment of the diagnosis listed and that she requires [facility] for greater 30 days.      Update Admission H&P: No change in H&P    PHYSICIAN SIGNATURE:  Electronically signed by Titus Wong MD on 10/28/22 at 12:25 PM EDT

## 2022-10-28 NOTE — PROGRESS NOTES
Comprehensive Nutrition Assessment    Type and Reason for Visit:  Reassess    Nutrition Recommendations/Plan:   Diet consistency as per speech therapy, pureed with mildly thick liquids  Will offer frozen oral nutrition supplements during stay  Total assist with meals, encourage intake   Will continue to follow up during stay      Malnutrition Assessment:  Malnutrition Status:  Severe malnutrition (ongoing problem) (10/27/22 1038)    Context:  Chronic Illness       Nutrition Assessment:    Able to resume diet as per speech therapy, pureed with mildly thick liquids. Continues to need total assist with meals. Did not eat much breakfast today. Will resume oral nutrition supplements, frozen supplement while on thickened liquids. Will continue to follow at high nutrition risk. Nutrition Related Findings:    resting in bed mumbling, nurse in room for care, IVF D 5 Wound Type: None       Current Nutrition Intake & Therapies:    Average Meal Intake: Unable to assess  Average Supplements Intake: None Ordered  ADULT DIET; Dysphagia - Pureed; Mildly Thick (Nectar)    Anthropometric Measures:  Height: 5' 2\" (157.5 cm)  Ideal Body Weight (IBW): 110 lbs (50 kg)    Admission Body Weight: 89 lb 15.2 oz (40.8 kg)  Current Body Weight: 92 lb 9.5 oz (42 kg), 81.8 % IBW.  Weight Source: Bed Scale (admit last week)  Current BMI (kg/m2): 16.9  Usual Body Weight:  (# in past 2 years per hx)     Weight Adjustment For: No Adjustment                 BMI Categories: Underweight (BMI less than 18.5)    Estimated Daily Nutrient Needs:  Energy Requirements Based On: Kcal/kg  Weight Used for Energy Requirements: Current  Energy (kcal/day): 3766-7991 (30-35 belén/kg)  Weight Used for Protein Requirements: Current  Protein (g/day): 61 (1.5 g/kg)  Method Used for Fluid Requirements: 1 ml/kcal  Fluid (ml/day): 1400    Nutrition Diagnosis:   Severe malnutrition, In context of chronic illness related to cognitive or neurological impairment, inadequate protein-energy intake as evidenced by severe muscle loss, severe loss of subcutaneous fat    Nutrition Interventions:   Food and/or Nutrient Delivery: Continue Current Diet, Start Oral Nutrition Supplement  Nutrition Education/Counseling: Education not appropriate  Coordination of Nutrition Care: Feeding Assistance/Environment Change, Continue to monitor while inpatient       Goals:  Previous Goal Met: Progressing toward Goal(s)  Goals: PO intake 50% or greater, prior to discharge       Nutrition Monitoring and Evaluation:   Behavioral-Environmental Outcomes: None Identified  Food/Nutrient Intake Outcomes: Supplement Intake, Food and Nutrient Intake  Physical Signs/Symptoms Outcomes: Biochemical Data, Meal Time Behavior, Skin, Weight    Discharge Planning:    Continue current diet, Continue Oral Nutrition Supplement     Kiran Carter RD, LD  Contact: [phone]

## 2022-10-28 NOTE — PROGRESS NOTES
Report called to: Nicol. at [facility]. This RN was notified by Jian Olivarez that [facility] had sent a bed alarm to hospital with the patient by accident. However this RN did not find bed alarm .   Notified charge RN Alee Tiwari, who is unaware of missing bed alarm

## 2022-10-28 NOTE — CARE COORDINATION
RONIT called [facility] and spoke with [employer]. When pt is discharged she will need transportation as the facility does not supply that. Also, pt is not able to have an IV at the facility, no IV antibiotics. RONIT called the facility and spoke with Nicol to inform her the pt was returning. Faxed new medication list to [employer]. [place] will  pt at 15:30. Tanya Pascual RN aware.  RONIT called guardian, Royal Virk to inform her that pt was returning to [facility].

## 2022-10-29 ENCOUNTER — APPOINTMENT (OUTPATIENT)
Dept: GENERAL RADIOLOGY | Age: 82
DRG: 871 | End: 2022-10-29
Payer: COMMERCIAL

## 2022-10-29 VITALS
BODY MASS INDEX: 18.26 KG/M2 | WEIGHT: 99.21 LBS | OXYGEN SATURATION: 97 % | TEMPERATURE: 97.4 F | RESPIRATION RATE: 25 BRPM | HEART RATE: 89 BPM | HEIGHT: 62 IN | SYSTOLIC BLOOD PRESSURE: 73 MMHG | DIASTOLIC BLOOD PRESSURE: 55 MMHG

## 2022-10-29 LAB
GLUCOSE BLD-MCNC: 113 MG/DL (ref 70–99)
GLUCOSE BLD-MCNC: 81 MG/DL (ref 70–99)
LACTIC ACID, SEPSIS: 1 MMOL/L (ref 0.5–1.9)
POTASSIUM SERPL-SCNC: 4 MMOL/L (ref 3.5–5.1)

## 2022-10-29 PROCEDURE — 84132 ASSAY OF SERUM POTASSIUM: CPT

## 2022-10-29 PROCEDURE — 94640 AIRWAY INHALATION TREATMENT: CPT

## 2022-10-29 PROCEDURE — 2580000003 HC RX 258: Performed by: STUDENT IN AN ORGANIZED HEALTH CARE EDUCATION/TRAINING PROGRAM

## 2022-10-29 PROCEDURE — 92526 ORAL FUNCTION THERAPY: CPT

## 2022-10-29 PROCEDURE — 94761 N-INVAS EAR/PLS OXIMETRY MLT: CPT

## 2022-10-29 PROCEDURE — 6370000000 HC RX 637 (ALT 250 FOR IP): Performed by: STUDENT IN AN ORGANIZED HEALTH CARE EDUCATION/TRAINING PROGRAM

## 2022-10-29 PROCEDURE — 71045 X-RAY EXAM CHEST 1 VIEW: CPT

## 2022-10-29 PROCEDURE — 83605 ASSAY OF LACTIC ACID: CPT

## 2022-10-29 PROCEDURE — 36415 COLL VENOUS BLD VENIPUNCTURE: CPT

## 2022-10-29 PROCEDURE — 6360000002 HC RX W HCPCS: Performed by: STUDENT IN AN ORGANIZED HEALTH CARE EDUCATION/TRAINING PROGRAM

## 2022-10-29 PROCEDURE — 82962 GLUCOSE BLOOD TEST: CPT

## 2022-10-29 RX ORDER — IPRATROPIUM BROMIDE AND ALBUTEROL SULFATE 2.5; .5 MG/3ML; MG/3ML
1 SOLUTION RESPIRATORY (INHALATION) EVERY 4 HOURS PRN
Status: DISCONTINUED | OUTPATIENT
Start: 2022-10-29 | End: 2022-10-29 | Stop reason: HOSPADM

## 2022-10-29 RX ORDER — IPRATROPIUM BROMIDE AND ALBUTEROL SULFATE 2.5; .5 MG/3ML; MG/3ML
1 SOLUTION RESPIRATORY (INHALATION) EVERY 4 HOURS
Status: DISCONTINUED | OUTPATIENT
Start: 2022-10-29 | End: 2022-10-29

## 2022-10-29 RX ADMIN — HEPARIN SODIUM 5000 UNITS: 5000 INJECTION INTRAVENOUS; SUBCUTANEOUS at 11:48

## 2022-10-29 RX ADMIN — BUSPIRONE HYDROCHLORIDE 5 MG: 5 TABLET ORAL at 10:18

## 2022-10-29 RX ADMIN — SODIUM CHLORIDE, PRESERVATIVE FREE 10 ML: 5 INJECTION INTRAVENOUS at 11:49

## 2022-10-29 RX ADMIN — CIPROFLOXACIN HYDROCHLORIDE 500 MG: 500 TABLET, FILM COATED ORAL at 10:18

## 2022-10-29 RX ADMIN — MEMANTINE 5 MG: 10 TABLET ORAL at 10:18

## 2022-10-29 RX ADMIN — IPRATROPIUM BROMIDE AND ALBUTEROL SULFATE 1 AMPULE: .5; 2.5 SOLUTION RESPIRATORY (INHALATION) at 12:01

## 2022-10-29 RX ADMIN — PANTOPRAZOLE SODIUM 40 MG: 40 TABLET, DELAYED RELEASE ORAL at 06:37

## 2022-10-29 RX ADMIN — DIVALPROEX SODIUM 250 MG: 125 CAPSULE, COATED PELLETS ORAL at 10:17

## 2022-10-29 RX ADMIN — POTASSIUM BICARBONATE 40 MEQ: 782 TABLET, EFFERVESCENT ORAL at 12:19

## 2022-10-29 RX ADMIN — DEXTROSE MONOHYDRATE: 50 INJECTION, SOLUTION INTRAVENOUS at 01:59

## 2022-10-29 ASSESSMENT — LIFESTYLE VARIABLES
HOW OFTEN DO YOU HAVE A DRINK CONTAINING ALCOHOL: NEVER
HOW MANY STANDARD DRINKS CONTAINING ALCOHOL DO YOU HAVE ON A TYPICAL DAY: PATIENT DOES NOT DRINK

## 2022-10-29 NOTE — PROGRESS NOTES
[address] [facility] OF SPEECH/LANGUAGE PATHOLOGY  DAILY PROGRESS NOTE  Nandini Poole  10/29/2022  [id number]  Encephalopathy [G93.40]  Bacteriuria [R82.71]  Pneumonia of right lower lobe due to infectious organism [E26.5]  Toxic metabolic encephalopathy [C73.5]  Allergies   Allergen Reactions    Ativan [Lorazepam] Other (See Comments)     Psychotic Reaction/Combative    Vicodin [Hydrocodone-Acetaminophen] Other (See Comments)     Psychotic reaction    Valium [Diazepam] Other (See Comments)     Ineffective         Pt was seen this date for dysphagia treatment. IMPRESSION AND RECOMMENDATIONS: Nandini Poole was seen for dysphagia tx today seated upright in bed. Pt ws presented with PO trials of thins, nectars, and purees. Oral phase appears impaired, characterized by impaired bolus acceptance intermittently, and suspected decreased A-P transfer. Pharyngeal phase appears mildly impaired characterized by decreased laryngeal elevation. No overt s/s of aspiration were noted, on thins trials 1/1 and nectars 4/4, but pt refused further diet trials. Recommend continued pureed diet with nectar thick liquids. Recommend continued ST. Recommend total feed and assistance with liquid intake. Results/recommendations d/w RN. GOALS (current status in bold):  Short-term Goals  Timeframe for Short-term Goals: length of admission  Goal 1: Pt will tolerate pureed diet/nectar thick liquids with adequate oral manipulation/clearance and no s/s aspiration. continue  Goal 2: Pt will tolerate PO trials of advanced textures with adequate oral manipulation/clearance and no s/s aspiration for possible diet upgrade. continue  Goal 3: Caregivers will indicate understanding of all education/recommendations.  continue                            EDUCATION: results/recommendations    PAIN RATING (0-10 Scale): 0  Time in/Time out: SLP Individual Minutes  Time In: 1000  Time Out: 9950  Minutes: 15    Visit number: ANABEL Billingsley  10/29/2022  9:17 AM

## 2022-10-29 NOTE — PROGRESS NOTES
V2.0  [facility] Hospitalist Progress Note      Name:  Nandini Poole /Age/Sex: 1940  (80 y.o. female)   MRN & CSN:  [id number] & [id number] Encounter Date/Time: 10/29/2022 8:44 AM EDT    Location:  [facility]- PCP: Elo Willoughby [address] [facility] Day: 4    Assessment and Plan:   Nandini Poole is a 80 y.o. female with a pmh of PCOM aneurysm, advanced dementia, nursing home resident with recent admission for encephalopathy, ESBL, expressive aphasia, history of ESBL UTI who presents with encephalopathy and UTI. Plan:  Acute encephalopathy: Likely metabolic in the setting of CHRIS and UTI  UTI  Pyuria  -Likely in the setting of sepsis secondary to UTI   -CT head negative  -Telemetry  -Follow blood cultures/urine cultures with more than 100,000 colonies of GNR's, awaiting sensitivities, analysis with 243 WBC. -Status post-vancomycin/meropenem in the ED  -Continue meropenem, de-escalated from Vancomycin  given GNRs in urine, de-escalated again to Cipro as below  -Fall/aspiration precautions        -Speech evaluated and dysphagia diet ordered  -Patient grew Pseudomonas and urine eventually and was susceptible to ciprofloxacin so she was switched to Cipro to complete 4 more days and a total of 7 days of treatment for UTI.   ----Awaiting information with case management for discharge. CHRIS: Resolved  -Likely prerenal in the setting of infection/dehydration  -Baseline creatinine 0.5-0.6, creatinine 1.2 on admission  -Strict intake/output record     Troponinemia: likely type 2 MI, plateaued   -No new significant EKG changes     Hypernatremia  -Likely in the setting of decreased p.o. intake dehydration  -Patient on D5, encourage oral hydration   Now that patient is more awake concern is low for continued hyponatremia as she should be taking more oral hydration.        History of multiple compression fractures T9-T12, L2-L4  -TLSO brace whenever upright or out of bed, plan for outpatient follow-up     GERD:  -IV PPI     0.8 cm solid nodule, noted on CT thoracic on previous admission:  -Defer management to outpatient if family interested     HLD:  -Statin     Dementia:  -Namenda 5 mg daily     Constipation:  -GlycoLax/Senokot     Miscellaneous:  -Buspirone/Depakote/ASA       CODE STATUS changed to DNR and comfort care after discussion with guardian 10/26. Diet ADULT DIET; Dysphagia - Pureed; Mildly Thick (Nectar)  ADULT ORAL NUTRITION SUPPLEMENT; Lunch, Dinner; Frozen Oral Supplement   DVT Prophylaxis [] Lovenox, [x]  Heparin, [] SCDs, [] Ambulation,  [] Eliquis, [] Xarelto  [] Coumadin   Code Status DNR-CC   Disposition From: home  Expected Disposition: home  Estimated Date of Discharge: Today or tomorrow  Patient requires continued admission awaiting placement   Surrogate Decision Maker/ DELROY Moser     Subjective:     Chief Complaint: No chief complaint on file. Elmer Weaver is a 80 y.o. female who presents with encephalopathy and UTI. No complaints today she is sitting in bed comfortably, nods no to pain. Review of Systems:    Review of Systems    Unable to obtain details given clinical state    Objective:   No intake or output data in the 24 hours ending 10/29/22 1301     Vitals:   Vitals:    10/29/22 1205   BP:    Pulse: 89   Resp: 25   Temp:    SpO2: 97%       Physical Exam:     General: NAD  Eyes: EOMI  ENT: neck supple  Cardiovascular: Normal rate regular rhythm  Respiratory: Clear to auscultation  Gastrointestinal: Soft, non tender  Genitourinary: no suprapubic tenderness  Musculoskeletal: No edema  Skin: warm, dry  Neuro: Alert. Mumbles words but does not really respond to orientation questions, nonfocal exam moves all 4 extremities  Psych: Mood appropriate.      Medications:   Medications:    ipratropium-albuterol  1 ampule Inhalation Q4H    ciprofloxacin  500 mg Oral 2 times per day    potassium bicarb-citric acid  40 mEq Oral BID    aspirin  81 mg Oral Daily    busPIRone  5 mg Oral TID    calcium-cholecalciferol  1 tablet Oral Daily    divalproex  250 mg Oral Daily    doxepin  10 mg Oral Nightly    ezetimibe  10 mg Oral Daily    memantine  5 mg Oral Daily    pantoprazole  40 mg Oral QAM AC    divalproex  125 mg Oral Dinner    sodium chloride flush  5-40 mL IntraVENous 2 times per day    heparin (porcine)  5,000 Units SubCUTAneous BID      Infusions:    dextrose 100 mL/hr at 10/29/22 0159    sodium chloride       PRN Meds: sodium chloride flush, 5-40 mL, PRN  sodium chloride, , PRN  acetaminophen, 500 mg, Q6H PRN   Or  acetaminophen, 487.5 mg, Q6H PRN      Labs      Recent Results (from the past 24 hour(s))   COVID-19, Rapid    Collection Time: 10/28/22  2:15 PM    Specimen: Nasopharyngeal   Result Value Ref Range    Source UNKNOWN     SARS-CoV-2, NAAT NOT DETECTED NOT DETECTED   Lactate, Sepsis    Collection Time: 10/28/22  4:31 PM   Result Value Ref Range    Lactic Acid, Sepsis 1.0 0.5 - 1.9 mMOL/L   Lactate, Sepsis    Collection Time: 10/28/22  8:18 PM   Result Value Ref Range    Lactic Acid, Sepsis 1.5 0.5 - 1.9 mMOL/L   POCT Glucose    Collection Time: 10/29/22 12:39 AM   Result Value Ref Range    POC Glucose 81 70 - 99 MG/DL   POCT Glucose    Collection Time: 10/29/22  6:07 AM   Result Value Ref Range    POC Glucose 113 (H) 70 - 99 MG/DL        Imaging/Diagnostics Last 24 Hours   CT HEAD WO CONTRAST    Result Date: 10/26/2022  EXAMINATION: CT OF THE HEAD WITHOUT CONTRAST  10/26/2022 2:37 am TECHNIQUE: CT of the head was performed without the administration of intravenous contrast. Automated exposure control, iterative reconstruction, and/or weight based adjustment of the mA/kV was utilized to reduce the radiation dose to as low as reasonably achievable. COMPARISON: CT head dated October 16, 2022. HISTORY: ORDERING SYSTEM PROVIDED HISTORY: AMS TECHNOLOGIST PROVIDED HISTORY: Reason for exam:->AMS Has a \"code stroke\" or \"stroke alert\" been called? ->No Decision Support Exception - unselect if not a suspected or confirmed emergency medical condition->Emergency Medical Condition (MA) Reason for Exam: AMS FINDINGS: BRAIN/VENTRICLES: There is no acute intracranial hemorrhage, mass effect or midline shift. No abnormal extra-axial fluid collection. The gray-white differentiation is maintained without evidence of an acute infarct. There is no evidence of hydrocephalus. Generalized cerebral atrophy is noted with chronic microvascular ischemic changes. There is redemonstration of an anterior communicating artery aneurysm measuring 1 cm. This is not significantly changed. ORBITS: The visualized portion of the orbits demonstrate no acute abnormality. SINUSES: The visualized paranasal sinuses and mastoid air cells demonstrate no acute abnormality. SOFT TISSUES/SKULL:  No acute abnormality of the visualized skull or soft tissues. No acute intracranial abnormality. Generalized cerebral atrophy with chronic microvascular ischemic changes. 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 XR CHEST PORTABLE    Result Date: 10/26/2022  EXAMINATION: ONE XRAY VIEW OF THE CHEST 10/26/2022 12:48 am COMPARISON: 09/16/2022 and CT chest/thoracic spine 10/14/2022 HISTORY: ORDERING SYSTEM PROVIDED HISTORY: fever TECHNOLOGIST PROVIDED HISTORY: Reason for exam:->fever Reason for Exam: fever FINDINGS: There is right greater than left bibasilar airspace disease. There is unchanged elevation of the right hemidiaphragm. There is no pneumothorax or pleural effusion. The heart size is normal.  Bone findings noted on the recent CT study are beneath the resolution of the portable chest x-ray. Right greater than left bibasilar airspace disease may represent pneumonia and or atelectasis.        Electronically signed by Leldon Cranker, MD on 10/29/2022 at 1:01 PM

## 2022-10-29 NOTE — PROGRESS NOTES
Patient does NOT qualify for home oxygen. SpO2 on room air 98%. Patient is not able to ambulate for second part of home oxygen evaluation. If not discharging today, patient may need overnight oximetry study.

## 2022-10-29 NOTE — RT PROTOCOL NOTE
RT Inhaler-Nebulizer Bronchodilator Protocol Note    There is a bronchodilator order in the chart from a provider indicating to follow the RT Bronchodilator Protocol and there is an Initiate RT Inhaler-Nebulizer Bronchodilator Protocol order as well (see protocol at bottom of note). CXR Findings:  XR CHEST PORTABLE    Result Date: 10/29/2022  Bilateral mid-basilar subsegmental atelectasis or pneumonia subjectively worse on the right but similar to 10/26/2022. Findings may be accentuated by underlying chronic lung disease. The findings from the last RT Protocol Assessment were as follows:   History Pulmonary Disease: None or smoker <15 pack years  Respiratory Pattern: Regular pattern and RR 12-20 bpm  Breath Sounds: Slightly diminished and/or crackles  Cough: Strong, spontaneous, non-productive  Indication for Bronchodilator Therapy: Decreased or absent breath sounds  Bronchodilator Assessment Score: 2    Aerosolized bronchodilator medication orders have been revised according to the RT Inhaler-Nebulizer Bronchodilator Protocol below. Respiratory Therapist to perform RT Therapy Protocol Assessment initially then follow the protocol. Repeat RT Therapy Protocol Assessment PRN for score 0-3 or on second treatment, BID, and PRN for scores above 3. No Indications - adjust the frequency to every 6 hours PRN wheezing or bronchospasm, if no treatments needed after 48 hours then discontinue using Per Protocol order mode. If indication present, adjust the RT bronchodilator orders based on the Bronchodilator Assessment Score as indicated below. Use Inhaler orders unless patient has one or more of the following: on home nebulizer, not able to hold breath for 10 seconds, is not alert and oriented, cannot activate and use MDI correctly, or respiratory rate 25 breaths per minute or more, then use the equivalent nebulizer order(s) with same Frequency and PRN reasons based on the score.   If a patient is on

## 2022-10-29 NOTE — PROGRESS NOTES
10/29/2022 10:20 AM  Patient Room #: 9728/2248-R  Patient Name: Johnny Alexandre    (Step 1 Done by RN if possible otherwise call Pulmonary Diagnostics)  Place patient on room air at rest for at least 30 minutes. If patient falls below 88% before 30 minutes then you can record the level and stop. Record room air saturation level 98%. If patient is at 88% or below, they will qualify for home oxygen and you can stop. If level does not fall below 88%, fill in level above. If indicated continue to Step 2. Signature: Tamera Jara RRT  Date: 10/29/2022   (Step 2&3 Done by [facility])  Ambulate patient on room air until saturation falls below 89%. Record level of room air saturation with ambulation___ %. Next, place patient back on ___lpm oxygen and ambulate, record level __%. (Note:  this level must show improvement from room air level done with ambulation.)  If patients saturation on room air with ambulation is 88% or below AND patient shows improvement with oxygen during ambulation, they will qualify for home oxygen and you can stop. If patient does not drop below 89%, then patient should have an overnight oximetry trending on room air to see if level falls below 88%. Complete level in Step 3 below. Room air overnight oximetry level 88 % for___  cumulative minutes. If patients room air oxygen level is < 89% for at least 5 cumulative minutes, patient will qualify for home oxygen and you can stop. (Attach Night Trending Report)    Complete order below: Diagnosis:____  Home oxygen at:  Length of Need: ? Lifetime ?  3 Months     ___lpm or __%   via  [] nasal cannula  []mask  [] other         []continuous []  with activity  []  Nocturnal   [] Portable Tanks []  Concentrator  [] Conserving Device        Therapist Signature:_______     Date:  ___  Physician Signature:  __Electronically Signed in EMR_    Date:___  Physician Printed Name: Nia Silverio MD  Provider ID: [id number]  NPI: [id number]      [] Patient Qualifies      [x] Patient Does NOT qualify

## 2022-10-29 NOTE — PROGRESS NOTES
V2.0  [facility] Hospitalist Progress Note      Name:  Dinah Gandhi /Age/Sex: 1940  (80 y.o. female)   MRN & CSN:  [id number] & [id number] Encounter Date/Time: 10/29/2022 8:44 AM EDT    Location:  6036/7609-P PCP: Akbar Shine, [address] Day: 4    Assessment and Plan:   Dinah Gandhi is a 80 y.o. female with a pmh of PCOM aneurysm, advanced dementia, nursing home resident with recent admission for encephalopathy, ESBL, expressive aphasia, history of ESBL UTI who presents with encephalopathy and UTI. Plan:  Acute encephalopathy: Likely metabolic in the setting of CHRIS and UTI  UTI  Pyuria  -Likely in the setting of sepsis secondary to UTI   -CT head negative  -Telemetry  -Follow blood cultures/urine cultures with more than 100,000 colonies of GNR's, awaiting sensitivities, analysis with 243 WBC. -Status post-vancomycin/meropenem in the ED  -Continue meropenem, de-escalated from Vancomycin  given GNRs in urine, de-escalated again to Cipro as below  -Fall/aspiration precautions        -Speech evaluated and dysphagia diet ordered  -Patient grew Pseudomonas and urine eventually and was susceptible to ciprofloxacin so she was switched to Cipro to complete 4 more days and a total of 7 days of treatment for UTI.   ----Awaiting information with case management for discharge. Will discharge back to facility once confirmation of facility readiness, there was confusion about discharge last night as patient was supposed to go and then facility was hesitant because she desatted to 88% while they. Patient did come back to the hospital given concerns from facility. Patient is on room air this morning with no oxygen on satting 98 to 100%, oxygenation was never an issue for her, so we will attempt discharge again given transient hypoxia now resolved.       CHRIS: Resolved  -Likely prerenal in the setting of infection/dehydration  -Baseline creatinine 0.5-0.6, creatinine 1.2 on admission  -Strict intake/output record     Troponinemia: likely type 2 MI, plateaued   -No new significant EKG changes     Hypernatremia  -Likely in the setting of decreased p.o. intake dehydration  -Patient on D5, encourage oral hydration   Now that patient is more awake concern is low for continued hyponatremia as she should be taking more oral hydration. History of multiple compression fractures T9-T12, L2-L4  -TLSO brace whenever upright or out of bed, plan for outpatient follow-up     GERD:  -IV PPI     0.8 cm solid nodule, noted on CT thoracic on previous admission:  -Defer management to outpatient if family interested     HLD:  -Statin     Dementia:  -Namenda 5 mg daily     Constipation:  -GlycoLax/Senokot     Miscellaneous:  -Buspirone/Depakote/ASA       CODE STATUS changed to DNR and comfort care after discussion with guardian 10/26. Diet ADULT DIET; Dysphagia - Pureed; Mildly Thick (Nectar)  ADULT ORAL NUTRITION SUPPLEMENT; Lunch, Dinner; Frozen Oral Supplement   DVT Prophylaxis [] Lovenox, [x]  Heparin, [] SCDs, [] Ambulation,  [] Eliquis, [] Xarelto  [] Coumadin   Code Status DNR-CC   Disposition From: home  Expected Disposition: home  Estimated Date of Discharge: Today or tomorrow  Patient requires continued admission awaiting placement   Surrogate Decision Maker/ DELROY Moser     Subjective:     Chief Complaint: No chief complaint on file. Meredith Alvarado is a 80 y.o. female who presents with encephalopathy and UTI. No complaints today she is sitting in bed comfortably, nods no to pain.      Review of Systems:    Review of Systems    Unable to obtain details given clinical state    Objective:   No intake or output data in the 24 hours ending 10/29/22 1306     Vitals:   Vitals:    10/29/22 1205   BP:    Pulse: 89   Resp: 25   Temp:    SpO2: 97%       Physical Exam:     General: NAD  Eyes: EOMI  ENT: neck supple  Cardiovascular: Normal rate regular rhythm  Respiratory: Clear to without the administration of intravenous contrast. Automated exposure control, iterative reconstruction, and/or weight based adjustment of the mA/kV was utilized to reduce the radiation dose to as low as reasonably achievable. COMPARISON: CT head dated October 16, 2022. HISTORY: ORDERING SYSTEM PROVIDED HISTORY: AMS TECHNOLOGIST PROVIDED HISTORY: Reason for exam:->AMS Has a \"code stroke\" or \"stroke alert\" been called? ->No Decision Support Exception - unselect if not a suspected or confirmed emergency medical condition->Emergency Medical Condition (MA) Reason for Exam: AMS FINDINGS: BRAIN/VENTRICLES: There is no acute intracranial hemorrhage, mass effect or midline shift. No abnormal extra-axial fluid collection. The gray-white differentiation is maintained without evidence of an acute infarct. There is no evidence of hydrocephalus. Generalized cerebral atrophy is noted with chronic microvascular ischemic changes. There is redemonstration of an anterior communicating artery aneurysm measuring 1 cm. This is not significantly changed. ORBITS: The visualized portion of the orbits demonstrate no acute abnormality. SINUSES: The visualized paranasal sinuses and mastoid air cells demonstrate no acute abnormality. SOFT TISSUES/SKULL:  No acute abnormality of the visualized skull or soft tissues. No acute intracranial abnormality. Generalized cerebral atrophy with chronic microvascular ischemic changes. 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 XR CHEST PORTABLE    Result Date: 10/26/2022  EXAMINATION: ONE XRAY VIEW OF THE CHEST 10/26/2022 12:48 am COMPARISON: 09/16/2022 and CT chest/thoracic spine 10/14/2022 HISTORY: ORDERING SYSTEM PROVIDED HISTORY: fever TECHNOLOGIST PROVIDED HISTORY: Reason for exam:->fever Reason for Exam: fever FINDINGS: There is right greater than left bibasilar airspace disease. There is unchanged elevation of the right hemidiaphragm.   There is no pneumothorax or pleural effusion. The heart size is normal.  Bone findings noted on the recent CT study are beneath the resolution of the portable chest x-ray. Right greater than left bibasilar airspace disease may represent pneumonia and or atelectasis.        Electronically signed by Natanael Patel MD on 10/29/2022 at 1:06 PM

## 2022-10-31 LAB
CULTURE: NORMAL
CULTURE: NORMAL
Lab: NORMAL
Lab: NORMAL
SPECIMEN: NORMAL
SPECIMEN: NORMAL

## 2022-11-28 ENCOUNTER — TELEPHONE (OUTPATIENT)
Dept: NEUROSURGERY | Age: 82
End: 2022-11-28

## 2022-11-28 NOTE — TELEPHONE ENCOUNTER
Spoke to Noman Toure nurse at [facility] regarding upcoming appointment with Kassidy Minaya PA-C on Friday, 12/2/22 @ 9 am.     This appointment has been confirmed.

## 2022-12-02 NOTE — TELEPHONE ENCOUNTER
Aysha the nurse at [address] [place] [address] called the office today to cancel patient's appointment this morning at 9 am. States the patient is now under hospice care. Appointment has been cancelled.
